# Patient Record
Sex: MALE | Race: WHITE | NOT HISPANIC OR LATINO | Employment: OTHER | URBAN - METROPOLITAN AREA
[De-identification: names, ages, dates, MRNs, and addresses within clinical notes are randomized per-mention and may not be internally consistent; named-entity substitution may affect disease eponyms.]

---

## 2017-04-03 ENCOUNTER — OFFICE VISIT (OUTPATIENT)
Dept: INTERNAL MEDICINE | Facility: CLINIC | Age: 52
End: 2017-04-03

## 2017-04-03 VITALS
SYSTOLIC BLOOD PRESSURE: 142 MMHG | WEIGHT: 240.4 LBS | TEMPERATURE: 97.7 F | BODY MASS INDEX: 34.41 KG/M2 | HEIGHT: 70 IN | DIASTOLIC BLOOD PRESSURE: 70 MMHG

## 2017-04-03 DIAGNOSIS — M25.572 ACUTE LEFT ANKLE PAIN: ICD-10-CM

## 2017-04-03 DIAGNOSIS — M25.50 CHRONIC JOINT PAIN: ICD-10-CM

## 2017-04-03 DIAGNOSIS — F41.9 ANXIETY AND DEPRESSION: ICD-10-CM

## 2017-04-03 DIAGNOSIS — A60.01 HERPES SIMPLEX INFECTION OF PENIS: ICD-10-CM

## 2017-04-03 DIAGNOSIS — I10 BENIGN ESSENTIAL HTN: Primary | ICD-10-CM

## 2017-04-03 DIAGNOSIS — G89.29 CHRONIC JOINT PAIN: ICD-10-CM

## 2017-04-03 DIAGNOSIS — Z12.11 SCREENING FOR COLON CANCER: ICD-10-CM

## 2017-04-03 DIAGNOSIS — F32.A ANXIETY AND DEPRESSION: ICD-10-CM

## 2017-04-03 DIAGNOSIS — K21.9 GASTROESOPHAGEAL REFLUX DISEASE WITHOUT ESOPHAGITIS: ICD-10-CM

## 2017-04-03 PROBLEM — F41.0 PANIC ATTACK: Status: ACTIVE | Noted: 2017-04-03

## 2017-04-03 PROCEDURE — 99204 OFFICE O/P NEW MOD 45 MIN: CPT | Performed by: INTERNAL MEDICINE

## 2017-04-03 RX ORDER — VALACYCLOVIR HYDROCHLORIDE 1 G/1
1000 TABLET, FILM COATED ORAL DAILY
Qty: 30 TABLET | Refills: 11 | Status: SHIPPED | OUTPATIENT
Start: 2017-04-03 | End: 2017-09-28 | Stop reason: SDUPTHER

## 2017-04-03 RX ORDER — METOPROLOL TARTRATE 50 MG/1
50 TABLET, FILM COATED ORAL 2 TIMES DAILY
COMMUNITY
End: 2017-04-03 | Stop reason: SDUPTHER

## 2017-04-03 RX ORDER — DIPHENHYDRAMINE HCL 25 MG
25 TABLET ORAL EVERY 8 HOURS PRN
Qty: 21 TABLET | Refills: 2 | Status: SHIPPED | OUTPATIENT
Start: 2017-04-03 | End: 2017-05-31 | Stop reason: SDUPTHER

## 2017-04-03 RX ORDER — AMITRIPTYLINE HYDROCHLORIDE 150 MG/1
150 TABLET, FILM COATED ORAL NIGHTLY
COMMUNITY
End: 2017-04-03 | Stop reason: SDUPTHER

## 2017-04-03 RX ORDER — GABAPENTIN 600 MG/1
1200 TABLET ORAL EVERY EVENING
Qty: 60 TABLET | Refills: 5 | Status: SHIPPED | OUTPATIENT
Start: 2017-04-03 | End: 2017-06-19 | Stop reason: SDUPTHER

## 2017-04-03 RX ORDER — HYDRALAZINE HYDROCHLORIDE 25 MG/1
1 TABLET, FILM COATED ORAL 2 TIMES DAILY
Refills: 0 | COMMUNITY
Start: 2017-03-09 | End: 2017-04-03 | Stop reason: SDUPTHER

## 2017-04-03 RX ORDER — METOPROLOL TARTRATE 50 MG/1
50 TABLET, FILM COATED ORAL 2 TIMES DAILY
Qty: 60 TABLET | Refills: 5 | Status: SHIPPED | OUTPATIENT
Start: 2017-04-03 | End: 2017-09-28 | Stop reason: SDUPTHER

## 2017-04-03 RX ORDER — GABAPENTIN 600 MG/1
2 TABLET ORAL NIGHTLY
Refills: 0 | COMMUNITY
Start: 2017-03-10 | End: 2017-04-03 | Stop reason: SDUPTHER

## 2017-04-03 RX ORDER — HYDRALAZINE HYDROCHLORIDE 25 MG/1
25 TABLET, FILM COATED ORAL 2 TIMES DAILY
Qty: 60 TABLET | Refills: 5 | Status: SHIPPED | OUTPATIENT
Start: 2017-04-03 | End: 2017-09-28 | Stop reason: SDUPTHER

## 2017-04-03 RX ORDER — AMITRIPTYLINE HYDROCHLORIDE 150 MG/1
150 TABLET, FILM COATED ORAL EVERY EVENING
Qty: 30 TABLET | Refills: 5 | Status: SHIPPED | OUTPATIENT
Start: 2017-04-03 | End: 2017-09-28 | Stop reason: SDUPTHER

## 2017-04-03 RX ORDER — LISINOPRIL 40 MG/1
40 TABLET ORAL DAILY
COMMUNITY
End: 2017-04-03 | Stop reason: SDUPTHER

## 2017-04-03 RX ORDER — LISINOPRIL 40 MG/1
40 TABLET ORAL DAILY
Qty: 30 TABLET | Refills: 5 | Status: SHIPPED | OUTPATIENT
Start: 2017-04-03 | End: 2017-09-28 | Stop reason: SDUPTHER

## 2017-04-03 RX ORDER — ACYCLOVIR 800 MG/1
1 TABLET ORAL 2 TIMES DAILY
Refills: 0 | COMMUNITY
Start: 2017-03-09 | End: 2017-04-03

## 2017-04-03 NOTE — PROGRESS NOTES
Subjective   Raj Kuhn is a 51 y.o. male here to establish care for HTN, DJD, chronic joint pain, GERD.  He needs refills on all his meds. Just recently got out of [place]way and getting acclimated back into society. He has had a multitude of orthopedic procedures and now has left ankle pain which is acute. He has never had surgery or a problem with this ankle, no injury, no swelling or deformity. Feels sharp and hurts every day with movement. Nothing makes worse or better other than rest. Is living in a detention house right now until July hopefully. He has hx HSV 2 (genital) and is on acyclovir but it is not keeping his outbreaks at bay. Valtrex works better. Also having GERD that was previously not bothering him but it has come back. Was on Nexium which helped him. Has recently been treated for scabies x2 while in [place] and it is much improved but still has some sores on his buttock and groin area that is itchy as well. Has a derm appt soon. Never had a cscope and interested in that. Brings his medical record today.     Review of Systems   Constitutional: Negative.    HENT: Negative.    Eyes: Negative.    Respiratory: Negative.    Cardiovascular: Negative.    Gastrointestinal:        Acid reflux   Endocrine: Negative.    Genitourinary: Negative.    Musculoskeletal: Positive for arthralgias and joint swelling.   Skin:        Itching, sores on groin and buttock   Allergic/Immunologic: Negative.    Neurological: Negative.    Hematological: Negative.    Psychiatric/Behavioral: Positive for dysphoric mood. The patient is nervous/anxious.        History reviewed. No pertinent past medical history.  Family History   Problem Relation Age of Onset   • Arthritis Mother    • Hypertension Mother    • Hypertension Father    • Thyroid disease Father    • Heart attack Brother      Past Surgical History:   Procedure Laterality Date   • HIP SURGERY     • KNEE SURGERY     • SHOULDER SURGERY     • TONSILLECTOMY     • VASECTOMY    "  • WRIST ARTHROPLASTY       Social History     Social History   • Marital status: Single     Spouse name: N/A   • Number of children: N/A   • Years of education: N/A     Occupational History   • Not on file.     Social History Main Topics   • Smoking status: Never Smoker   • Smokeless tobacco: Never Used   • Alcohol use No   • Drug use: No   • Sexual activity: Not on file     Other Topics Concern   • Not on file     Social History Narrative   • No narrative on file         Current Outpatient Prescriptions:   •  acyclovir (ZOVIRAX) 800 MG tablet, Take 1 tablet by mouth 2 (Two) Times a Day., Disp: , Rfl: 0  •  amitriptyline (ELAVIL) 150 MG tablet, Take 150 mg by mouth Every Night., Disp: , Rfl:   •  gabapentin (NEURONTIN) 600 MG tablet, Take 2 tablets by mouth Every Night., Disp: , Rfl: 0  •  hydrALAZINE (APRESOLINE) 25 MG tablet, Take 1 tablet by mouth 2 (Two) Times a Day., Disp: , Rfl: 0  •  lisinopril (PRINIVIL,ZESTRIL) 40 MG tablet, Take 40 mg by mouth Daily., Disp: , Rfl:   •  metoprolol tartrate (LOPRESSOR) 50 MG tablet, Take 50 mg by mouth 2 (Two) Times a Day., Disp: , Rfl:   •  sertraline (ZOLOFT) 50 MG tablet, Take 50 mg by mouth Daily., Disp: , Rfl:     Objective   /70 (BP Location: Right arm, Patient Position: Sitting, Cuff Size: Adult)  Temp 97.7 °F (36.5 °C) (Temporal Artery )   Ht 70\" (177.8 cm)  Wt 240 lb 6.4 oz (109 kg)  BMI 34.49 kg/m2  Physical Exam   Constitutional: He is oriented to person, place, and time. He appears well-developed and well-nourished.   HENT:   Head: Normocephalic and atraumatic.   Cardiovascular: Normal rate, regular rhythm and normal heart sounds.  Exam reveals no gallop and no friction rub.    No murmur heard.  Pulmonary/Chest: Effort normal and breath sounds normal. He has no wheezes.   Musculoskeletal:   Normal gait and station   Neurological: He is alert and oriented to person, place, and time.   Skin: Skin is warm and dry.   Erythematous round lesions on " forearms, no scabies tracks or signs of infection   Psychiatric: He has a normal mood and affect. His behavior is normal. Judgment and thought content normal.   Vitals reviewed.      Assessment/Plan   Raj was seen today for establish care. Reviewed extensive medical record and recent labs, labs normal.    Diagnoses and all orders for this visit:    Benign essential HTN  -     hydrALAZINE (APRESOLINE) 25 MG tablet; Take 1 tablet by mouth 2 (Two) Times a Day.  -     lisinopril (PRINIVIL,ZESTRIL) 40 MG tablet; Take 1 tablet by mouth Daily.  -     metoprolol tartrate (LOPRESSOR) 50 MG tablet; Take 1 tablet by mouth 2 (Two) Times a Day.    Gastroesophageal reflux disease without esophagitis  -     esomeprazole (NEXIUM) 20 MG capsule; Take 1 capsule by mouth Every Morning Before Breakfast.    Chronic joint pain  -     gabapentin (NEURONTIN) 600 MG tablet; Take 2 tablets by mouth Every Evening.  -     Ambulatory Referral to Pain Management    Anxiety and depression  -     amitriptyline (ELAVIL) 150 MG tablet; Take 1 tablet by mouth Every Evening.  -     sertraline (ZOLOFT) 50 MG tablet; Take 1 tablet by mouth Daily.    Acute left ankle pain  -     XR Ankle 3+ View Left    Herpes simplex infection of penis  -     valACYclovir (VALTREX) 1000 MG tablet; Take 1 tablet by mouth Daily.    Screening for colon cancer  -     Ambulatory Referral For Screening Colonoscopy    Other orders  -     diphenhydrAMINE (BENADRYL) 25 MG tablet; Take 1 tablet by mouth Every 8 (Eight) Hours As Needed for Itching.

## 2017-04-03 NOTE — PATIENT INSTRUCTIONS
Esomeprazole capsules  What is this medicine?  ESOMEPRAZOLE (es oh ME pray zol) prevents the production of acid in the stomach. It is used to treat gastroesophageal reflux disease (GERD), ulcers, certain bacteria in the stomach, and inflammation of the esophagus. It can also be used to prevent ulcers in patients taking medicines called NSAIDs.  You can also buy this medicine without a prescription to treat the symptoms of heartburn. The non-prescription product is not for long-term use, unless otherwise directed by your doctor or health care professional.  This medicine may be used for other purposes; ask your health care provider or pharmacist if you have questions.  COMMON BRAND NAME(S): Nexium, Nexium 24HR  What should I tell my health care provider before I take this medicine?  They need to know if you have any of these conditions:  -bloody or black, tarry stools  -chest pain  -have had heartburn for over 3 months  -have heartburn with dizziness, lightheadedness, or sweating  -liver disease  -low levels of magnesium in the blood  -lupus  -nausea, vomiting  -stomach pain  -trouble swallowing  -unexplained weight loss  -vomiting with blood  -wheezing  -an unusual or allergic reaction to esomeprazole, other medicines, foods, dyes, or preservatives  -pregnant or trying to get pregnant  -breast-feeding  How should I use this medicine?  Take this medicine by mouth. Swallow the capsules whole with a drink of water. Follow the directions on the prescription or product label. Do not crush, break or chew. The capsules can be opened and the contents sprinkled on applesauce. Do not crush the contents into the food. This medicine works best if taken on an empty stomach at least one hour before a meal. Take your medicine at regular intervals. Do not take your medicine more often than directed.  Talk to your pediatrician regarding the use of this medicine in children. Special care may be needed.  Overdosage: If you think you  have taken too much of this medicine contact a poison control center or emergency room at once.  NOTE: This medicine is only for you. Do not share this medicine with others.  What if I miss a dose?  If you miss a dose, take it as soon as you can. If it is almost time for your next dose, take only that dose. Do not take double or extra doses.  What may interact with this medicine?  Do not take this medicine with any of the following medications:  -atazanavir  This medicine may also interact with the following medications:  -ampicillin  -antiviral medicines for HIV or AIDS  -certain medicines for fungal infections like ketoconazole and itraconazole  -cilostazol  -clopidogrel  -diazepam  -digoxin  -erlotinib  -diuretics  -iron salts  -methotrexate  -Nathaniel's Wort  -tacrolimus  -rifampin  -warfarin  This list may not describe all possible interactions. Give your health care provider a list of all the medicines, herbs, non-prescription drugs, or dietary supplements you use. Also tell them if you smoke, drink alcohol, or use illegal drugs. Some items may interact with your medicine.  What should I watch for while using this medicine?  If you are taking this medicine without a prescription, it may take 1 to 4 days for it to fully relieve your heartburn.  If you are using this medicine with a prescription from your healthcare professional for a more serious condition, it can take several days before your condition gets better. Check with your doctor or health care professional if your condition does not start to get better, or if it gets worse.  If you take this medicine for long periods of time, you may need blood work done.  What side effects may I notice from receiving this medicine?  Side effects that you should report to your doctor or health care professional as soon as possible:  -allergic reactions like skin rash, itching or hives, swelling of the face, lips, or tongue  -bone, muscle or joint pain  -breathing  problems  -chest pain or chest tightness  -dark yellow or brown urine  -fast, irregular heartbeat  -feeling faint or lightheaded  -fever or sore throat  -muscle spasms  -rash on cheeks or arms that gets worse in the sun  -tremors  -unusual bleeding or bruising  -unusually weak or tired  -upset stomach  -yellowing of the eyes or skin  Side effects that usually do not require medical attention (report to your doctor or health care professional if they continue or are bothersome):  -constipation  -diarrhea  -dry mouth  -headache  -nausea  -stomach pain or gas  -vomiting  This list may not describe all possible side effects. Call your doctor for medical advice about side effects. You may report side effects to FDA at 6-347-FDA-5207.  Where should I keep my medicine?  Keep out of the reach of children.  Store at room temperature between 15 and 30 degrees C (59 and 86 degrees F). Protect from light and moisture. Throw away any unused medicine after the expiration date.  NOTE: This sheet is a summary. It may not cover all possible information. If you have questions about this medicine, talk to your doctor, pharmacist, or health care provider.     © 2017, [employer]. (2017-01-19 12:52:41)

## 2017-04-07 ENCOUNTER — HOSPITAL ENCOUNTER (OUTPATIENT)
Dept: GENERAL RADIOLOGY | Facility: HOSPITAL | Age: 52
Discharge: HOME | End: 2017-04-07
Admitting: INTERNAL MEDICINE

## 2017-04-07 PROCEDURE — 73610 X-RAY EXAM OF ANKLE: CPT

## 2017-04-26 ENCOUNTER — OFFICE VISIT (OUTPATIENT)
Dept: INTERNAL MEDICINE | Facility: CLINIC | Age: 52
End: 2017-04-26

## 2017-04-26 VITALS
WEIGHT: 243 LBS | DIASTOLIC BLOOD PRESSURE: 78 MMHG | TEMPERATURE: 97.9 F | HEIGHT: 70 IN | BODY MASS INDEX: 34.79 KG/M2 | SYSTOLIC BLOOD PRESSURE: 134 MMHG

## 2017-04-26 DIAGNOSIS — M54.50 ACUTE BILATERAL LOW BACK PAIN WITHOUT SCIATICA: Primary | ICD-10-CM

## 2017-04-26 PROCEDURE — 99213 OFFICE O/P EST LOW 20 MIN: CPT | Performed by: INTERNAL MEDICINE

## 2017-04-26 PROCEDURE — 96372 THER/PROPH/DIAG INJ SC/IM: CPT | Performed by: INTERNAL MEDICINE

## 2017-04-26 RX ORDER — KETOROLAC TROMETHAMINE 30 MG/ML
30 INJECTION, SOLUTION INTRAMUSCULAR; INTRAVENOUS ONCE
Status: DISCONTINUED | OUTPATIENT
Start: 2017-04-26 | End: 2017-04-26

## 2017-04-26 RX ORDER — CYCLOBENZAPRINE HCL 5 MG
5-10 TABLET ORAL 3 TIMES DAILY PRN
Qty: 42 TABLET | Refills: 2 | Status: SHIPPED | OUTPATIENT
Start: 2017-04-26 | End: 2017-05-31 | Stop reason: SDUPTHER

## 2017-04-26 RX ORDER — HYDROCODONE BITARTRATE AND ACETAMINOPHEN 5; 325 MG/1; MG/1
1 TABLET ORAL EVERY 6 HOURS PRN
Qty: 12 TABLET | Refills: 0 | Status: SHIPPED | OUTPATIENT
Start: 2017-04-26 | End: 2017-09-01 | Stop reason: SDUPTHER

## 2017-04-26 RX ORDER — KETOROLAC TROMETHAMINE 30 MG/ML
60 INJECTION, SOLUTION INTRAMUSCULAR; INTRAVENOUS ONCE
Status: COMPLETED | OUTPATIENT
Start: 2017-04-26 | End: 2017-04-26

## 2017-04-26 RX ADMIN — KETOROLAC TROMETHAMINE 60 MG: 30 INJECTION, SOLUTION INTRAMUSCULAR; INTRAVENOUS at 09:31

## 2017-04-26 NOTE — PATIENT INSTRUCTIONS
Back Pain, Adult  Back pain is very common in adults. The cause of back pain is rarely dangerous and the pain often gets better over time. The cause of your back pain may not be known. Some common causes of back pain include:  · Strain of the muscles or ligaments supporting the spine.  · Wear and tear (degeneration) of the spinal disks.  · Arthritis.  · Direct injury to the back.  For many people, back pain may return. Since back pain is rarely dangerous, most people can learn to manage this condition on their own.  HOME CARE INSTRUCTIONS  Watch your back pain for any changes. The following actions may help to lessen any discomfort you are feeling:  · Remain active. It is stressful on your back to sit or  one place for long periods of time. Do not sit, drive, or  one place for more than 30 minutes at a time. Take short walks on even surfaces as soon as you are able. Try to increase the length of time you walk each day.  · Exercise regularly as directed by your health care provider. Exercise helps your back heal faster. It also helps avoid future injury by keeping your muscles strong and flexible.  · Do not stay in bed. Resting more than 1-2 days can delay your recovery.  · Pay attention to your body when you bend and lift. The most comfortable positions are those that put less stress on your recovering back. Always use proper lifting techniques, including:    Bending your knees.    Keeping the load close to your body.    Avoiding twisting.  · Find a comfortable position to sleep. Use a firm mattress and lie on your side with your knees slightly bent. If you lie on your back, put a pillow under your knees.  · Avoid feeling anxious or stressed. Stress increases muscle tension and can worsen back pain. It is important to recognize when you are anxious or stressed and learn ways to manage it, such as with exercise.  · Take medicines only as directed by your health care provider. Over-the-counter  medicines to reduce pain and inflammation are often the most helpful. Your health care provider may prescribe muscle relaxant drugs. These medicines help dull your pain so you can more quickly return to your normal activities and healthy exercise.  · Apply ice to the injured area:    Put ice in a plastic bag.    Place a towel between your skin and the bag.    Leave the ice on for 20 minutes, 2-3 times a day for the first 2-3 days. After that, ice and heat may be alternated to reduce pain and spasms.  · Maintain a healthy weight. Excess weight puts extra stress on your back and makes it difficult to maintain good posture.  SEEK MEDICAL CARE IF:  · You have pain that is not relieved with rest or medicine.  · You have increasing pain going down into the legs or buttocks.  · You have pain that does not improve in one week.  · You have night pain.  · You lose weight.  · You have a fever or chills.  SEEK IMMEDIATE MEDICAL CARE IF:   · You develop new bowel or bladder control problems.  · You have unusual weakness or numbness in your arms or legs.  · You develop nausea or vomiting.  · You develop abdominal pain.  · You feel faint.     This information is not intended to replace advice given to you by your health care provider. Make sure you discuss any questions you have with your health care provider.     Document Released: 12/18/2006 Document Revised: 01/08/2016 Document Reviewed: 04/21/2015  [employer] Interactive Patient Education ©2016 [employer]

## 2017-04-26 NOTE — PROGRESS NOTES
"Subjective   Raj Kuhn is a 51 y.o. male here for acute back pain. He has chronic back pain but it has flared after volunteering recently and lifting several heavy boxes of food/cans. It is the second day since that activity. It feels like a spasm, achy, sharp at times, low back radiating to either side. No sciatica but does also have chronic b/l knee pain and left shoulder pain. No numbness or tingling in legs or feet. Has taken an NSAID with no relief. Toradol inj used to work. Tramadol has been prescribed in the past but made him feel very anxious and shaky so prefers to not have that. Has appt with his ortho next week.       The following portions of the patient's history were reviewed and updated as appropriate: allergies, current medications, past medical history, past social history, past surgical history and problem list.    Review of Systems:  General: negative  MSK: multiple joints pain  Skin: negative  Neuro: negative    Objective   /78 (BP Location: Right arm, Patient Position: Sitting, Cuff Size: Large Adult)  Temp 97.9 °F (36.6 °C) (Temporal Artery )   Ht 70\" (177.8 cm)  Wt 243 lb (110 kg)  BMI 34.87 kg/m2    Physical Exam   Constitutional: He is oriented to person, place, and time. He appears well-developed and well-nourished.   Pulmonary/Chest: Effort normal.   Musculoskeletal:   Normal gait and station though appears to be in pain   Neurological: He is alert and oriented to person, place, and time.   Skin: Skin is warm and dry.   Psychiatric: He has a normal mood and affect. His behavior is normal. Judgment and thought content normal.   Vitals reviewed.      Assessment/Plan   Raj was seen today for back pain.    Diagnoses and all orders for this visit:    Acute bilateral low back pain without sciatica  -     cyclobenzaprine (FLEXERIL) 5 MG tablet; Take 1-2 tablets by mouth 3 (Three) Times a Day As Needed for Muscle Spasms.  -     HYDROcodone-acetaminophen (NORCO) 5-325 MG per " tablet; Take 1 tablet by mouth Every 6 (Six) Hours As Needed for Moderate Pain (4-6) or Severe Pain (7-10).  -     ketorolac (TORADOL) injection 60 mg; Inject 60 mg into the shoulder, thigh, or buttocks 1 (One) Time.

## 2017-05-31 DIAGNOSIS — M54.50 ACUTE BILATERAL LOW BACK PAIN WITHOUT SCIATICA: ICD-10-CM

## 2017-05-31 RX ORDER — CYCLOBENZAPRINE HCL 5 MG
5-10 TABLET ORAL 3 TIMES DAILY PRN
Qty: 42 TABLET | Refills: 2 | Status: SHIPPED | OUTPATIENT
Start: 2017-05-31 | End: 2017-09-01 | Stop reason: SDUPTHER

## 2017-05-31 RX ORDER — DIPHENHYDRAMINE HCL 25 MG
25 TABLET ORAL EVERY 8 HOURS PRN
Qty: 21 TABLET | Refills: 2 | Status: SHIPPED | OUTPATIENT
Start: 2017-05-31 | End: 2017-09-28

## 2017-06-19 ENCOUNTER — TELEPHONE (OUTPATIENT)
Dept: INTERNAL MEDICINE | Facility: CLINIC | Age: 52
End: 2017-06-19

## 2017-06-19 DIAGNOSIS — M25.50 CHRONIC JOINT PAIN: ICD-10-CM

## 2017-06-19 DIAGNOSIS — G89.29 CHRONIC JOINT PAIN: ICD-10-CM

## 2017-06-19 RX ORDER — GABAPENTIN 600 MG/1
1200 TABLET ORAL EVERY EVENING
Qty: 60 TABLET | Refills: 1
Start: 2017-06-19 | End: 2017-07-03 | Stop reason: SDUPTHER

## 2017-06-19 NOTE — TELEPHONE ENCOUNTER
Patient is requesting a hand written script for Neurontin. Patient states that pharmacy will no longer honor his refills since the medication will soon be a controlled.

## 2017-07-03 DIAGNOSIS — M25.50 CHRONIC JOINT PAIN: ICD-10-CM

## 2017-07-03 DIAGNOSIS — G89.29 CHRONIC JOINT PAIN: ICD-10-CM

## 2017-07-03 RX ORDER — GABAPENTIN 600 MG/1
1200 TABLET ORAL EVERY EVENING
Qty: 60 TABLET | Refills: 1 | OUTPATIENT
Start: 2017-07-03 | End: 2017-09-28 | Stop reason: SDUPTHER

## 2017-07-18 ENCOUNTER — TELEPHONE (OUTPATIENT)
Dept: GASTROENTEROLOGY | Facility: CLINIC | Age: 52
End: 2017-07-18

## 2017-07-18 DIAGNOSIS — R11.2 NAUSEA AND VOMITING, INTRACTABILITY OF VOMITING NOT SPECIFIED, UNSPECIFIED VOMITING TYPE: Primary | ICD-10-CM

## 2017-07-18 RX ORDER — ONDANSETRON 4 MG/1
4 TABLET, FILM COATED ORAL EVERY 6 HOURS PRN
Qty: 6 TABLET | Refills: 0 | Status: SHIPPED | OUTPATIENT
Start: 2017-07-18 | End: 2017-09-28

## 2017-07-20 ENCOUNTER — OUTSIDE FACILITY SERVICE (OUTPATIENT)
Dept: GASTROENTEROLOGY | Facility: CLINIC | Age: 52
End: 2017-07-20

## 2017-07-20 ENCOUNTER — LAB REQUISITION (OUTPATIENT)
Dept: LAB | Facility: HOSPITAL | Age: 52
End: 2017-07-20

## 2017-07-20 DIAGNOSIS — D12.0 BENIGN NEOPLASM OF CECUM: ICD-10-CM

## 2017-07-20 DIAGNOSIS — D12.5 BENIGN NEOPLASM OF SIGMOID COLON: ICD-10-CM

## 2017-07-20 PROCEDURE — 45385 COLONOSCOPY W/LESION REMOVAL: CPT | Performed by: INTERNAL MEDICINE

## 2017-07-20 PROCEDURE — 88305 TISSUE EXAM BY PATHOLOGIST: CPT | Performed by: INTERNAL MEDICINE

## 2017-07-20 PROCEDURE — G0500 MOD SEDAT ENDO SERVICE >5YRS: HCPCS | Performed by: INTERNAL MEDICINE

## 2017-07-21 LAB
CYTO UR: NORMAL
LAB AP CASE REPORT: NORMAL
LAB AP CLINICAL INFORMATION: NORMAL
Lab: NORMAL
PATH REPORT.FINAL DX SPEC: NORMAL
PATH REPORT.GROSS SPEC: NORMAL

## 2017-08-31 ENCOUNTER — TELEPHONE (OUTPATIENT)
Dept: INTERNAL MEDICINE | Facility: CLINIC | Age: 52
End: 2017-08-31

## 2017-09-01 ENCOUNTER — OFFICE VISIT (OUTPATIENT)
Dept: INTERNAL MEDICINE | Facility: CLINIC | Age: 52
End: 2017-09-01

## 2017-09-01 VITALS
SYSTOLIC BLOOD PRESSURE: 120 MMHG | DIASTOLIC BLOOD PRESSURE: 68 MMHG | WEIGHT: 232 LBS | TEMPERATURE: 97.4 F | BODY MASS INDEX: 33.21 KG/M2 | HEIGHT: 70 IN

## 2017-09-01 DIAGNOSIS — Z23 NEED FOR INFLUENZA VACCINATION: ICD-10-CM

## 2017-09-01 DIAGNOSIS — M25.562 ACUTE PAIN OF LEFT KNEE: Primary | ICD-10-CM

## 2017-09-01 PROCEDURE — 99213 OFFICE O/P EST LOW 20 MIN: CPT | Performed by: INTERNAL MEDICINE

## 2017-09-01 PROCEDURE — 90656 IIV3 VACC NO PRSV 0.5 ML IM: CPT | Performed by: INTERNAL MEDICINE

## 2017-09-01 PROCEDURE — 90471 IMMUNIZATION ADMIN: CPT | Performed by: INTERNAL MEDICINE

## 2017-09-01 RX ORDER — CYCLOBENZAPRINE HCL 5 MG
5-10 TABLET ORAL 3 TIMES DAILY PRN
Qty: 42 TABLET | Refills: 2 | Status: SHIPPED | OUTPATIENT
Start: 2017-09-01 | End: 2017-09-28

## 2017-09-01 RX ORDER — HYDROCODONE BITARTRATE AND ACETAMINOPHEN 5; 325 MG/1; MG/1
1-2 TABLET ORAL EVERY 8 HOURS PRN
Qty: 18 TABLET | Refills: 0 | Status: SHIPPED | OUTPATIENT
Start: 2017-09-01 | End: 2017-09-05 | Stop reason: SDUPTHER

## 2017-09-01 NOTE — PATIENT INSTRUCTIONS
Acetaminophen; Hydrocodone tablets or capsules  What is this medicine?  ACETAMINOPHEN; HYDROCODONE (a set a KELSI eric fen; ed droe KOE done) is a pain reliever. It is used to treat moderate to severe pain.  This medicine may be used for other purposes; ask your health care provider or pharmacist if you have questions.  COMMON BRAND NAME(S): Anexsia, Bancap HC, Ceta-Plus, Co-Gesic, Comfortpak, Dolagesic, Dolorex Forte, DuoCet, Hydrocet, Hydrogesic, Lorcet, Lorcet HD, Lorcet Plus, Lortab, Margesic H, Maxidone, Norco, Polygesic, Stagesic, Vanacet, Verdrocet, Vicodin, Vicodin ES, Vicodin HP, Xodol, Zydone  What should I tell my health care provider before I take this medicine?  They need to know if you have any of these conditions:  -brain tumor  -Crohn's disease, inflammatory bowel disease, or ulcerative colitis  -drug abuse or addiction  -head injury  -heart or circulation problems  -if you often drink alcohol  -kidney disease or problems going to the bathroom  -liver disease  -lung disease, asthma, or breathing problems  -an unusual or allergic reaction to acetaminophen, hydrocodone, other opioid analgesics, other medicines, foods, dyes, or preservatives  -pregnant or trying to get pregnant  -breast-feeding  How should I use this medicine?  Take this medicine by mouth with a glass of water. Follow the directions on the prescription label. You can take it with or without food. If it upsets your stomach, take it with food. Do not take your medicine more often than directed.  A special MedGuide will be given to you by the pharmacist with each prescription and refill. Be sure to read this information carefully each time.  Talk to your pediatrician regarding the use of this medicine in children. Special care may be needed.  Overdosage: If you think you have taken too much of this medicine contact a poison control center or emergency room at once.  NOTE: This medicine is only for you. Do not share this medicine with  others.  What if I miss a dose?  If you miss a dose, take it as soon as you can. If it is almost time for your next dose, take only that dose. Do not take double or extra doses.  What may interact with this medicine?  This medicine may interact with the following medications:  -alcohol  -antiviral medicines for HIV or AIDS  -atropine  -antihistamines for allergy, cough and cold  -certain antibiotics like erythromycin, clarithromycin  -certain medicines for anxiety or sleep  -certain medicines for bladder problems like oxybutynin, tolterodine  -certain medicines for depression like amitriptyline, fluoxetine, sertraline  -certain medicines for fungal infections like ketoconazole and itraconazole  -certain medicines for Parkinson's disease like benztropine, trihexyphenidyl  -certain medicines for seizures like carbamazepine, phenobarbital, phenytoin, primidone  -certain medicines for stomach problems like dicyclomine, hyoscyamine  -certain medicines for travel sickness like scopolamine  -general anesthetics like halothane, isoflurane, methoxyflurane, propofol  -ipratropium  -local anesthetics like lidocaine, pramoxine, tetracaine  -MAOIs like Carbex, Eldepryl, Marplan, Nardil, and Parnate  -medicines that relax muscles for surgery  -other medicines with acetaminophen  -other narcotic medicines for pain or cough  -phenothiazines like chlorpromazine, mesoridazine, prochlorperazine, thioridazine  -rifampin  This list may not describe all possible interactions. Give your health care provider a list of all the medicines, herbs, non-prescription drugs, or dietary supplements you use. Also tell them if you smoke, drink alcohol, or use illegal drugs. Some items may interact with your medicine.  What should I watch for while using this medicine?  Tell your doctor or health care professional if your pain does not go away, if it gets worse, or if you have new or a different type of pain. You may develop tolerance to the medicine.  Tolerance means that you will need a higher dose of the medicine for pain relief. Tolerance is normal and is expected if you take the medicine for a long time.  Do not suddenly stop taking your medicine because you may develop a severe reaction. Your body becomes used to the medicine. This does NOT mean you are addicted. Addiction is a behavior related to getting and using a drug for a non-medical reason. If you have pain, you have a medical reason to take pain medicine. Your doctor will tell you how much medicine to take. If your doctor wants you to stop the medicine, the dose will be slowly lowered over time to avoid any side effects.  There are different types of narcotic medicines (opiates). If you take more than one type at the same time or if you are taking another medicine that also causes drowsiness, you may have more side effects. Give your health care provider a list of all medicines you use. Your doctor will tell you how much medicine to take. Do not take more medicine than directed. Call emergency for help if you have problems breathing or unusual sleepiness.  Do not take other medicines that contain acetaminophen with this medicine. Always read labels carefully. If you have questions, ask your doctor or pharmacist.  If you take too much acetaminophen get medical help right away. Too much acetaminophen can be very dangerous and cause liver damage. Even if you do not have symptoms, it is important to get help right away.  You may get drowsy or dizzy. Do not drive, use machinery, or do anything that needs mental alertness until you know how this medicine affects you. Do not stand or sit up quickly, especially if you are an older patient. This reduces the risk of dizzy or fainting spells. Alcohol may interfere with the effect of this medicine. Avoid alcoholic drinks.  The medicine will cause constipation. Try to have a bowel movement at least every 2 to 3 days. If you do not have a bowel movement for 3  days, call your doctor or health care professional.  Your mouth may get dry. Chewing sugarless gum or sucking hard candy, and drinking plenty of water may help. Contact your doctor if the problem does not go away or is severe.  What side effects may I notice from receiving this medicine?  Side effects that you should report to your doctor or health care professional as soon as possible:  -allergic reactions like skin rash, itching or hives, swelling of the face, lips, or tongue  -breathing problems  -confusion  -redness, blistering, peeling or loosening of the skin, including inside the mouth  -signs and symptoms of low blood pressure like dizziness; feeling faint or lightheaded, falls; unusually weak or tired  -trouble passing urine or change in the amount of urine  -yellowing of the eyes or skin  Side effects that usually do not require medical attention (report to your doctor or health care professional if they continue or are bothersome):  -constipation  -dry mouth  -nausea, vomiting  -tiredness  This list may not describe all possible side effects. Call your doctor for medical advice about side effects. You may report side effects to FDA at 6-999-FDA-2009.  Where should I keep my medicine?  Keep out of the reach of children. This medicine can be abused. Keep your medicine in a safe place to protect it from theft. Do not share this medicine with anyone. Selling or giving away this medicine is dangerous and against the law.  This medicine may cause accidental overdose and death if it taken by other adults, children, or pets. Mix any unused medicine with a substance like cat litter or coffee grounds. Then throw the medicine away in a sealed container like a sealed bag or a coffee can with a lid. Do not use the medicine after the expiration date.  Store at room temperature between 15 and 30 degrees C (59 and 86 degrees F).  NOTE: This sheet is a summary. It may not cover all possible information. If you have  questions about this medicine, talk to your doctor, pharmacist, or health care provider.     © 2017, [employer]. (2016-09-09 10:02:16)

## 2017-09-01 NOTE — PROGRESS NOTES
"Subjective   Raj Kuhn is a 52 y.o. male here for acute left knee pain since last week. He started walking around his neighborhood for exercise (3-4 times) and since then he has had left knee pain.  He is most concerned about the swelling that has gone along with it. It is mostly above his knee joint. He is also having shooting pain into the upper calf and lower thigh that he describes as muscle spasm and tightness in the muscle. He has hx of quadriceps and cruciate ligament tears in that knee. He has an ortho in [place] that he sees frequently but with it about to be the weekend he was concerned and didn't want to end up in the ED.        The following portions of the patient's history were reviewed and updated as appropriate: allergies, current medications, past medical history, past social history and problem list.    Review of Systems:  General: negative  Neuro: no numbness  MSK: see hpi    Objective   /68 (BP Location: Right arm, Patient Position: Sitting, Cuff Size: Adult)  Temp 97.4 °F (36.3 °C) (Temporal Artery )   Ht 70\" (177.8 cm)  Wt 232 lb (105 kg)  BMI 33.29 kg/m2    Physical Exam   Constitutional: He appears well-developed and well-nourished.   Musculoskeletal:   Left knee with mild effusion under patella and some non-edematous swelling above knee joint. Normal ROM. No erythema, warmth, and no ttp.  No calf or thigh pain though there is active muscle spasm and tightness of left IT band   Skin: No erythema.   No erythema of knee   Vitals reviewed.      Assessment/Plan   Raj was seen today for knee pain.    Diagnoses and all orders for this visit:    Acute pain of left knee  -     HYDROcodone-acetaminophen (NORCO) 5-325 MG per tablet; Take 1-2 tablets by mouth Every 8 (Eight) Hours As Needed for Moderate Pain  or Severe Pain .  -     cyclobenzaprine (FLEXERIL) 5 MG tablet; Take 1-2 tablets by mouth 3 (Three) Times a Day As Needed for Muscle Spasms.     Need for influenza " vaccination  -     Flu Vaccine Quad PF >18YR (AFLURIA 4885-8111)

## 2017-09-05 DIAGNOSIS — M25.562 ACUTE PAIN OF LEFT KNEE: ICD-10-CM

## 2017-09-06 RX ORDER — HYDROCODONE BITARTRATE AND ACETAMINOPHEN 5; 325 MG/1; MG/1
1-2 TABLET ORAL EVERY 8 HOURS PRN
Qty: 18 TABLET | Refills: 0 | Status: SHIPPED | OUTPATIENT
Start: 2017-09-06 | End: 2017-09-28

## 2017-09-28 ENCOUNTER — OFFICE VISIT (OUTPATIENT)
Dept: INTERNAL MEDICINE | Facility: CLINIC | Age: 52
End: 2017-09-28

## 2017-09-28 VITALS
SYSTOLIC BLOOD PRESSURE: 124 MMHG | DIASTOLIC BLOOD PRESSURE: 72 MMHG | TEMPERATURE: 97.7 F | BODY MASS INDEX: 34.59 KG/M2 | WEIGHT: 241.6 LBS | HEIGHT: 70 IN

## 2017-09-28 DIAGNOSIS — F41.0 PANIC ATTACK: ICD-10-CM

## 2017-09-28 DIAGNOSIS — A60.01 HERPES SIMPLEX INFECTION OF PENIS: ICD-10-CM

## 2017-09-28 DIAGNOSIS — F41.9 ANXIETY AND DEPRESSION: Primary | ICD-10-CM

## 2017-09-28 DIAGNOSIS — G89.29 CHRONIC JOINT PAIN: ICD-10-CM

## 2017-09-28 DIAGNOSIS — I10 BENIGN ESSENTIAL HTN: ICD-10-CM

## 2017-09-28 DIAGNOSIS — M25.50 CHRONIC JOINT PAIN: ICD-10-CM

## 2017-09-28 DIAGNOSIS — F32.A ANXIETY AND DEPRESSION: Primary | ICD-10-CM

## 2017-09-28 DIAGNOSIS — K21.9 GASTROESOPHAGEAL REFLUX DISEASE, ESOPHAGITIS PRESENCE NOT SPECIFIED: ICD-10-CM

## 2017-09-28 PROCEDURE — 99214 OFFICE O/P EST MOD 30 MIN: CPT | Performed by: INTERNAL MEDICINE

## 2017-09-28 RX ORDER — METOPROLOL TARTRATE 50 MG/1
50 TABLET, FILM COATED ORAL 2 TIMES DAILY
Qty: 60 TABLET | Refills: 5 | Status: SHIPPED | OUTPATIENT
Start: 2017-09-28 | End: 2018-03-23 | Stop reason: SDUPTHER

## 2017-09-28 RX ORDER — CYCLOBENZAPRINE HCL 10 MG
10 TABLET ORAL 3 TIMES DAILY PRN
Qty: 90 TABLET | Refills: 5 | Status: SHIPPED | OUTPATIENT
Start: 2017-09-28 | End: 2018-03-26 | Stop reason: SDUPTHER

## 2017-09-28 RX ORDER — OMEPRAZOLE 20 MG/1
20 CAPSULE, DELAYED RELEASE ORAL DAILY
Qty: 90 CAPSULE | Refills: 2 | Status: SHIPPED | OUTPATIENT
Start: 2017-09-28 | End: 2018-03-23 | Stop reason: SDUPTHER

## 2017-09-28 RX ORDER — VALACYCLOVIR HYDROCHLORIDE 1 G/1
1000 TABLET, FILM COATED ORAL DAILY
Qty: 30 TABLET | Refills: 11 | Status: SHIPPED | OUTPATIENT
Start: 2017-09-28 | End: 2018-03-26 | Stop reason: SDUPTHER

## 2017-09-28 RX ORDER — HYDRALAZINE HYDROCHLORIDE 25 MG/1
25 TABLET, FILM COATED ORAL 2 TIMES DAILY
Qty: 60 TABLET | Refills: 5 | Status: SHIPPED | OUTPATIENT
Start: 2017-09-28 | End: 2018-03-26 | Stop reason: SDUPTHER

## 2017-09-28 RX ORDER — AMITRIPTYLINE HYDROCHLORIDE 150 MG/1
150 TABLET, FILM COATED ORAL EVERY EVENING
Qty: 30 TABLET | Refills: 5 | Status: SHIPPED | OUTPATIENT
Start: 2017-09-28 | End: 2018-03-23 | Stop reason: SDUPTHER

## 2017-09-28 RX ORDER — LISINOPRIL 40 MG/1
40 TABLET ORAL DAILY
Qty: 30 TABLET | Refills: 5 | Status: SHIPPED | OUTPATIENT
Start: 2017-09-28 | End: 2018-03-23 | Stop reason: SDUPTHER

## 2017-09-28 RX ORDER — ESCITALOPRAM OXALATE 10 MG/1
10 TABLET ORAL DAILY
Qty: 30 TABLET | Refills: 5 | Status: SHIPPED | OUTPATIENT
Start: 2017-09-28 | End: 2018-03-23 | Stop reason: SDUPTHER

## 2017-09-28 RX ORDER — GABAPENTIN 600 MG/1
1200 TABLET ORAL EVERY EVENING
Qty: 60 TABLET | Refills: 5 | Status: SHIPPED | OUTPATIENT
Start: 2017-09-28 | End: 2018-03-23 | Stop reason: SDUPTHER

## 2017-09-28 NOTE — PROGRESS NOTES
"Subjective   Raj Kuhn is a 52 y.o. male here for follow-up neuropathy, chronic joint pain, HTN, A&D. Gabapentin has worked well for his neuropathy thus far, needs a refill. Chronic joint pain continues, f/u with ortho tomorrow. Pain mgmt appt pending. Knee pain from last visit is much improved though his knees bother him chronically. No issues with BP, very pleased with his BP today. Compliant with meds. A&d is bothering him more however. Been on zoloft for years and doesn't think it's working. Anxiety is much higher recently and he has a bit of depression. Just feels overall anxiety. He also has low libido and no erections which he is concerned about. Wants to try something different.       The following portions of the patient's history were reviewed and updated as appropriate: allergies, current medications, past medical history, past social history and problem list.    Review of Systems:  General: negative  CV: negative  Respiratory: negative  : low libido, no erections  Neuro: neuropathy  Psych: A&D  MSK: chronic joint pain    Objective   /72 (BP Location: Right arm, Patient Position: Sitting, Cuff Size: Adult)  Temp 97.7 °F (36.5 °C) (Temporal Artery )   Ht 70\" (177.8 cm)  Wt 241 lb 9.6 oz (110 kg)  BMI 34.67 kg/m2    Physical Exam   Constitutional: He is oriented to person, place, and time. He appears well-developed and well-nourished.   Cardiovascular: Normal rate, regular rhythm and normal heart sounds.    Pulmonary/Chest: Effort normal and breath sounds normal. He has no wheezes. He has no rales.   Neurological: He is alert and oriented to person, place, and time.   Skin: Skin is warm and dry.   Psychiatric: He has a normal mood and affect. His behavior is normal. Thought content normal.   Vitals reviewed.      Assessment/Plan   Raj was seen today for hypertension.    Diagnoses and all orders for this visit:    Anxiety and depression  -     escitalopram (LEXAPRO) 10 MG tablet; Take 1 " tablet by mouth Daily. New med, discussed taper off zoloft and gave pt written instructions for that and to start lexapro after he stops zoloft. Discussed common SE  -     amitriptyline (ELAVIL) 150 MG tablet; Take 1 tablet by mouth Every Evening.    Chronic joint pain  -     gabapentin (NEURONTIN) 600 MG tablet; Take 2 tablets by mouth Every Evening.  -     cyclobenzaprine (FLEXERIL) 10 MG tablet; Take 1 tablet by mouth 3 (Three) Times a Day As Needed for Muscle Spasms.  -     Drugs of abuse swab done, narc contract signed, ferdinand not immediately available due to forces beyond my control but will evaluate as soon as it is made available to me.      Discussed that gabapentin is a controlled medication and should only be taken as prescribed, discussed abuse     Panic attack  -     escitalopram (LEXAPRO) 10 MG tablet; Take 1 tablet by mouth Daily.    Gastroesophageal reflux disease, esophagitis presence not specified  -     omeprazole (PRILOSEC) 20 MG capsule; Take 1 capsule by mouth Daily.    Herpes simplex infection of penis  -     valACYclovir (VALTREX) 1000 MG tablet; Take 1 tablet by mouth Daily.    Benign essential HTN  -     hydrALAZINE (APRESOLINE) 25 MG tablet; Take 1 tablet by mouth 2 (Two) Times a Day.  -     lisinopril (PRINIVIL,ZESTRIL) 40 MG tablet; Take 1 tablet by mouth Daily.  -     metoprolol tartrate (LOPRESSOR) 50 MG tablet; Take 1 tablet by mouth 2 (Two) Times a Day.

## 2017-09-28 NOTE — PATIENT INSTRUCTIONS
Gabapentin capsules or tablets  What is this medicine?  GABAPENTIN (GA ba pen tin) is used to control partial seizures in adults with epilepsy. It is also used to treat certain types of nerve pain.  This medicine may be used for other purposes; ask your health care provider or pharmacist if you have questions.  COMMON BRAND NAME(S): Active-PAC with Gabapentin, Gabarone, Neurontin  What should I tell my health care provider before I take this medicine?  They need to know if you have any of these conditions:  -kidney disease  -suicidal thoughts, plans, or attempt; a previous suicide attempt by you or a family member  -an unusual or allergic reaction to gabapentin, other medicines, foods, dyes, or preservatives  -pregnant or trying to get pregnant  -breast-feeding  How should I use this medicine?  Take this medicine by mouth with a glass of water. Follow the directions on the prescription label. You can take it with or without food. If it upsets your stomach, take it with food.Take your medicine at regular intervals. Do not take it more often than directed. Do not stop taking except on your doctor's advice.  If you are directed to break the 600 or 800 mg tablets in half as part of your dose, the extra half tablet should be used for the next dose. If you have not used the extra half tablet within 28 days, it should be thrown away.  A special MedGuide will be given to you by the pharmacist with each prescription and refill. Be sure to read this information carefully each time.  Talk to your pediatrician regarding the use of this medicine in children. Special care may be needed.  Overdosage: If you think you have taken too much of this medicine contact a poison control center or emergency room at once.  NOTE: This medicine is only for you. Do not share this medicine with others.  What if I miss a dose?  If you miss a dose, take it as soon as you can. If it is almost time for your next dose, take only that dose. Do not  take double or extra doses.  What may interact with this medicine?  Do not take this medicine with any of the following medications:  -other gabapentin products  This medicine may also interact with the following medications:  -alcohol  -antacids  -antihistamines for allergy, cough and cold  -certain medicines for anxiety or sleep  -certain medicines for depression or psychotic disturbances  -homatropine; hydrocodone  -naproxen  -narcotic medicines (opiates) for pain  -phenothiazines like chlorpromazine, mesoridazine, prochlorperazine, thioridazine  This list may not describe all possible interactions. Give your health care provider a list of all the medicines, herbs, non-prescription drugs, or dietary supplements you use. Also tell them if you smoke, drink alcohol, or use illegal drugs. Some items may interact with your medicine.  What should I watch for while using this medicine?  Visit your doctor or health care professional for regular checks on your progress. You may want to keep a record at home of how you feel your condition is responding to treatment. You may want to share this information with your doctor or health care professional at each visit. You should contact your doctor or health care professional if your seizures get worse or if you have any new types of seizures. Do not stop taking this medicine or any of your seizure medicines unless instructed by your doctor or health care professional. Stopping your medicine suddenly can increase your seizures or their severity.  Wear a medical identification bracelet or chain if you are taking this medicine for seizures, and carry a card that lists all your medications.  You may get drowsy, dizzy, or have blurred vision. Do not drive, use machinery, or do anything that needs mental alertness until you know how this medicine affects you. To reduce dizzy or fainting spells, do not sit or stand up quickly, especially if you are an older patient. Alcohol can  increase drowsiness and dizziness. Avoid alcoholic drinks.  Your mouth may get dry. Chewing sugarless gum or sucking hard candy, and drinking plenty of water will help.  The use of this medicine may increase the chance of suicidal thoughts or actions. Pay special attention to how you are responding while on this medicine. Any worsening of mood, or thoughts of suicide or dying should be reported to your health care professional right away.  Women who become pregnant while using this medicine may enroll in the [place] Antiepileptic Drug Pregnancy Registry by calling 1-306.273.1127. This registry collects information about the safety of antiepileptic drug use during pregnancy.  What side effects may I notice from receiving this medicine?  Side effects that you should report to your doctor or health care professional as soon as possible:  -allergic reactions like skin rash, itching or hives, swelling of the face, lips, or tongue  -worsening of mood, thoughts or actions of suicide or dying  Side effects that usually do not require medical attention (report to your doctor or health care professional if they continue or are bothersome):  -constipation  -difficulty walking or controlling muscle movements  -dizziness  -nausea  -slurred speech  -tiredness  -tremors  -weight gain  This list may not describe all possible side effects. Call your doctor for medical advice about side effects. You may report side effects to FDA at 7-507-FDA-9755.  Where should I keep my medicine?  Keep out of reach of children.  This medicine may cause accidental overdose and death if it taken by other adults, children, or pets. Mix any unused medicine with a substance like cat litter or coffee grounds. Then throw the medicine away in a sealed container like a sealed bag or a coffee can with a lid. Do not use the medicine after the expiration date.  Store at room temperature between 15 and 30 degrees C (59 and 86 degrees F).  NOTE: This  sheet is a summary. It may not cover all possible information. If you have questions about this medicine, talk to your doctor, pharmacist, or health care provider.     © 2017, [employer]. (2015-02-13 15:26:50)

## 2017-11-27 ENCOUNTER — OFFICE VISIT (OUTPATIENT)
Dept: INTERNAL MEDICINE | Facility: CLINIC | Age: 52
End: 2017-11-27

## 2017-11-27 VITALS — HEIGHT: 70 IN | WEIGHT: 251.6 LBS | BODY MASS INDEX: 36.02 KG/M2 | TEMPERATURE: 99.3 F

## 2017-11-27 DIAGNOSIS — J02.9 ACUTE PHARYNGITIS, UNSPECIFIED ETIOLOGY: ICD-10-CM

## 2017-11-27 DIAGNOSIS — J01.10 ACUTE NON-RECURRENT FRONTAL SINUSITIS: Primary | ICD-10-CM

## 2017-11-27 LAB
EXPIRATION DATE: NORMAL
INTERNAL CONTROL: NORMAL
Lab: NORMAL
S PYO AG THROAT QL: NEGATIVE

## 2017-11-27 PROCEDURE — 99213 OFFICE O/P EST LOW 20 MIN: CPT | Performed by: INTERNAL MEDICINE

## 2017-11-27 PROCEDURE — 87880 STREP A ASSAY W/OPTIC: CPT | Performed by: INTERNAL MEDICINE

## 2017-11-27 RX ORDER — SULFAMETHOXAZOLE AND TRIMETHOPRIM 800; 160 MG/1; MG/1
1 TABLET ORAL 2 TIMES DAILY
Qty: 14 TABLET | Refills: 0 | Status: SHIPPED | OUTPATIENT
Start: 2017-11-27 | End: 2018-01-12

## 2017-11-27 RX ORDER — ACETAMINOPHEN AND CODEINE PHOSPHATE 300; 30 MG/1; MG/1
1 TABLET ORAL EVERY 6 HOURS PRN
Qty: 12 TABLET | Refills: 0 | Status: SHIPPED | OUTPATIENT
Start: 2017-11-27 | End: 2018-01-12

## 2017-11-27 NOTE — PROGRESS NOTES
"Subjective   Raj Kuhn is a 52 y.o. male here for sinus pressure, congestion for 2 weeks but worsening last night acutely. He has felt hot though no documented fever. Has sinus pain, head pressure and headache, facial tenderness, feels like face is swollen. Overall feels fatigued and ill. No rash, slightly sore throat, no cough, some ear pressure as well.     PMH: reviewed  Meds: reviewed  Allergies: reviewed    Review of Systems   Constitutional: Positive for activity change, fatigue and fever. Negative for chills.   HENT: Positive for congestion, postnasal drip, rhinorrhea, sinus pressure, sneezing and sore throat. Negative for ear discharge and ear pain.    Respiratory: Negative for cough, shortness of breath and wheezing.    Gastrointestinal: Negative.    Musculoskeletal: Negative.    Skin: Negative.          Objective   Temp 99.3 °F (37.4 °C) (Temporal Artery )   Ht 70\" (177.8 cm)  Wt 251 lb 9.6 oz (114 kg)  BMI 36.1 kg/m2    Physical Exam   Constitutional: He appears well-developed and well-nourished.   HENT:   Right Ear: Tympanic membrane, external ear and ear canal normal.   Left Ear: Tympanic membrane, external ear and ear canal normal.   Nose: Rhinorrhea present. No mucosal edema. Right sinus exhibits maxillary sinus tenderness and frontal sinus tenderness. Left sinus exhibits maxillary sinus tenderness and frontal sinus tenderness.   Mouth/Throat: Mucous membranes are normal. Posterior oropharyngeal erythema present. No oropharyngeal exudate or posterior oropharyngeal edema.   Neck: Neck supple.   Pulmonary/Chest: Effort normal and breath sounds normal. No respiratory distress. He has no wheezes.   Lymphadenopathy:     He has no cervical adenopathy.   Skin: Skin is warm and dry.   Vitals reviewed.      Assessment/Plan   Raj was seen today for sinusitis.    Diagnoses and all orders for this visit:    Acute non-recurrent frontal sinusitis  -     acetaminophen-codeine (TYLENOL #3) 300-30 MG per " tablet; Take 1 tablet by mouth Every 6 (Six) Hours As Needed for Moderate Pain  (headache/facial pain).  -     sulfamethoxazole-trimethoprim (BACTRIM DS,SEPTRA DS) 800-160 MG per tablet; Take 1 tablet by mouth 2 (Two) Times a Day.    Acute pharyngitis, unspecified etiology  -     POC Rapid Strep A  -     sulfamethoxazole-trimethoprim (BACTRIM DS,SEPTRA DS) 800-160 MG per tablet; Take 1 tablet by mouth 2 (Two) Times a Day.

## 2017-12-11 ENCOUNTER — TELEPHONE (OUTPATIENT)
Dept: INTERNAL MEDICINE | Facility: CLINIC | Age: 52
End: 2017-12-11

## 2017-12-28 ENCOUNTER — TELEPHONE (OUTPATIENT)
Dept: INTERNAL MEDICINE | Facility: CLINIC | Age: 52
End: 2017-12-28

## 2017-12-28 DIAGNOSIS — G89.29 CHRONIC JOINT PAIN: Primary | ICD-10-CM

## 2017-12-28 DIAGNOSIS — M25.50 CHRONIC JOINT PAIN: Primary | ICD-10-CM

## 2017-12-28 NOTE — TELEPHONE ENCOUNTER
----- Message from Shabnam Zimmer MD sent at 12/28/2017 12:17 PM EST -----  Regarding: FW: Referral Request  Contact: [phone]   Please call and explain to him that I reviewed the paper work but it's the disability paper work that asks about bending, sitting, lifting, etc that I don't routinely fill out. I can't take what his ortho said and put it on paper as my own evaluation as that is fraud. Generally people have to see a doctor who specializes in disability exams.    ----- Message -----     From: Yenny Gabriel MA     Sent: 12/28/2017  12:06 PM       To: Shabnam Zimmer MD  Subject: FW: Referral Request                                 ----- Message -----     From: Raj Kuhn     Sent: 12/28/2017  11:29 AM       To: [facility]  Subject: Referral Request                                 Hi Dr. Zimmer,     I just made an appointment to see Dr. Joseph and  Ortho/Sports Medicine on January 4th.  [facility] said they need you to fax a referral to them at [phone] addressed to [facility] so he could process it before my appointment.      One more thing, were you able to complete the form for my disability.  I think there was a little confusion when I called to check if it was completed.  I brought two forms to you as well as the one Dr. Sandoval filled out for your records.  One form was a mental health form that you said you couldn't fill out because it asked for specific mental health values like AXIS, etc, the other was a blank form like Dr. Sandoval filled out.  You said you would be glad to fill that out because we discussed my need to have as much documentation to give to the disability people.  Thanks in advance for your assistance.      Thanks,   Raj Kuhn

## 2018-01-12 ENCOUNTER — OFFICE VISIT (OUTPATIENT)
Dept: INTERNAL MEDICINE | Facility: CLINIC | Age: 53
End: 2018-01-12

## 2018-01-12 VITALS
OXYGEN SATURATION: 96 % | SYSTOLIC BLOOD PRESSURE: 132 MMHG | HEIGHT: 70 IN | WEIGHT: 253.4 LBS | RESPIRATION RATE: 16 BRPM | TEMPERATURE: 98.6 F | DIASTOLIC BLOOD PRESSURE: 78 MMHG | HEART RATE: 94 BPM | BODY MASS INDEX: 36.28 KG/M2

## 2018-01-12 DIAGNOSIS — M54.42 ACUTE BILATERAL LOW BACK PAIN WITH LEFT-SIDED SCIATICA: Primary | ICD-10-CM

## 2018-01-12 PROCEDURE — 99213 OFFICE O/P EST LOW 20 MIN: CPT | Performed by: INTERNAL MEDICINE

## 2018-01-12 PROCEDURE — 96372 THER/PROPH/DIAG INJ SC/IM: CPT | Performed by: INTERNAL MEDICINE

## 2018-01-12 RX ORDER — CELECOXIB 200 MG/1
CAPSULE ORAL
COMMUNITY
Start: 2017-12-13 | End: 2019-05-13

## 2018-01-12 RX ORDER — HYDROCODONE BITARTRATE AND ACETAMINOPHEN 5; 325 MG/1; MG/1
1-2 TABLET ORAL EVERY 12 HOURS PRN
Qty: 12 TABLET | Refills: 0 | Status: SHIPPED | OUTPATIENT
Start: 2018-01-12 | End: 2018-06-18 | Stop reason: SDUPTHER

## 2018-01-12 RX ORDER — KETOROLAC TROMETHAMINE 30 MG/ML
30 INJECTION, SOLUTION INTRAMUSCULAR; INTRAVENOUS ONCE
Status: COMPLETED | OUTPATIENT
Start: 2018-01-12 | End: 2018-01-12

## 2018-01-12 RX ADMIN — KETOROLAC TROMETHAMINE 30 MG: 30 INJECTION, SOLUTION INTRAMUSCULAR; INTRAVENOUS at 14:30

## 2018-01-12 NOTE — PROGRESS NOTES
"Subjective   Raj Kuhn is a 52 y.o. male here for acute back pain. He was doing some heavy lifting recently, cleaning out a storage unit) and since then he has had back pain for 2 days. It is lumbar, L>R, associated with left-sided sciatica. The pain is dull and achy but can be very sharp and stabbing at times with certain movements. He isn't able to move very much due to pain. He also continues to have his chronic right shoulder and right knee pain as well that is unchanged.    PMH: reviewed  Meds: reviewed    Review of Systems   Constitutional: Negative.    Musculoskeletal: Positive for back pain and gait problem.   Skin: Negative.    Neurological: Negative for weakness.        Sciatica         Objective   /78  Pulse 94  Temp 98.6 °F (37 °C) (Temporal Artery )   Resp 16  Ht 177.8 cm (70\")  Wt 115 kg (253 lb 6.4 oz)  SpO2 96%  BMI 36.36 kg/m2    Physical Exam   Constitutional: He is oriented to person, place, and time. He appears well-developed and well-nourished.   Pulmonary/Chest: Effort normal.   Neurological: He is alert and oriented to person, place, and time.   Skin: Skin is warm and dry.   Psychiatric: He has a normal mood and affect. His behavior is normal. Judgment and thought content normal.   Vitals reviewed.      Assessment/Plan   Raj was seen today for back pain and shoulder pain.    Diagnoses and all orders for this visit:    Acute bilateral low back pain with left-sided sciatica  -     ketorolac (TORADOL) injection 30 mg; Inject 30 mg into the shoulder, thigh, or buttocks 1 (One) Time.  -     HYDROcodone-acetaminophen (NORCO) 5-325 MG per tablet; Take 1-2 tablets by mouth Every 12 (Twelve) Hours As Needed for Moderate Pain  or Severe Pain .           "

## 2018-03-23 DIAGNOSIS — I10 BENIGN ESSENTIAL HTN: ICD-10-CM

## 2018-03-23 DIAGNOSIS — F41.9 ANXIETY AND DEPRESSION: ICD-10-CM

## 2018-03-23 DIAGNOSIS — G89.29 CHRONIC JOINT PAIN: ICD-10-CM

## 2018-03-23 DIAGNOSIS — F32.A ANXIETY AND DEPRESSION: ICD-10-CM

## 2018-03-23 DIAGNOSIS — F41.0 PANIC ATTACK: ICD-10-CM

## 2018-03-23 DIAGNOSIS — K21.9 GASTROESOPHAGEAL REFLUX DISEASE, ESOPHAGITIS PRESENCE NOT SPECIFIED: ICD-10-CM

## 2018-03-23 DIAGNOSIS — M25.50 CHRONIC JOINT PAIN: ICD-10-CM

## 2018-03-23 RX ORDER — AMITRIPTYLINE HYDROCHLORIDE 150 MG/1
150 TABLET, FILM COATED ORAL EVERY EVENING
Qty: 30 TABLET | Refills: 5 | Status: SHIPPED | OUTPATIENT
Start: 2018-03-23 | End: 2018-04-11 | Stop reason: SDUPTHER

## 2018-03-23 RX ORDER — LISINOPRIL 40 MG/1
40 TABLET ORAL DAILY
Qty: 30 TABLET | Refills: 5 | Status: SHIPPED | OUTPATIENT
Start: 2018-03-23 | End: 2018-04-11 | Stop reason: SDUPTHER

## 2018-03-23 RX ORDER — ESCITALOPRAM OXALATE 10 MG/1
10 TABLET ORAL DAILY
Qty: 30 TABLET | Refills: 5 | Status: SHIPPED | OUTPATIENT
Start: 2018-03-23 | End: 2018-04-11 | Stop reason: SDUPTHER

## 2018-03-23 RX ORDER — OMEPRAZOLE 20 MG/1
20 CAPSULE, DELAYED RELEASE ORAL DAILY
Qty: 90 CAPSULE | Refills: 5 | Status: SHIPPED | OUTPATIENT
Start: 2018-03-23 | End: 2018-04-11 | Stop reason: SDUPTHER

## 2018-03-23 RX ORDER — METOPROLOL TARTRATE 50 MG/1
50 TABLET, FILM COATED ORAL 2 TIMES DAILY
Qty: 60 TABLET | Refills: 5 | Status: SHIPPED | OUTPATIENT
Start: 2018-03-23 | End: 2018-04-11 | Stop reason: SDUPTHER

## 2018-03-23 RX ORDER — GABAPENTIN 600 MG/1
1200 TABLET ORAL EVERY EVENING
Qty: 60 TABLET | Refills: 2
Start: 2018-03-23 | End: 2018-04-11 | Stop reason: SDUPTHER

## 2018-03-26 DIAGNOSIS — I10 BENIGN ESSENTIAL HTN: ICD-10-CM

## 2018-03-26 DIAGNOSIS — M25.50 CHRONIC JOINT PAIN: ICD-10-CM

## 2018-03-26 DIAGNOSIS — A60.01 HERPES SIMPLEX INFECTION OF PENIS: ICD-10-CM

## 2018-03-26 DIAGNOSIS — G89.29 CHRONIC JOINT PAIN: ICD-10-CM

## 2018-03-26 RX ORDER — HYDRALAZINE HYDROCHLORIDE 25 MG/1
25 TABLET, FILM COATED ORAL 2 TIMES DAILY
Qty: 60 TABLET | Refills: 5 | Status: SHIPPED | OUTPATIENT
Start: 2018-03-26 | End: 2018-04-11 | Stop reason: SDUPTHER

## 2018-03-26 RX ORDER — VALACYCLOVIR HYDROCHLORIDE 1 G/1
1000 TABLET, FILM COATED ORAL DAILY
Qty: 30 TABLET | Refills: 5 | Status: SHIPPED | OUTPATIENT
Start: 2018-03-26 | End: 2018-04-11 | Stop reason: SDUPTHER

## 2018-03-26 RX ORDER — CYCLOBENZAPRINE HCL 10 MG
10 TABLET ORAL 3 TIMES DAILY PRN
Qty: 90 TABLET | Refills: 5 | Status: SHIPPED | OUTPATIENT
Start: 2018-03-26 | End: 2018-04-11 | Stop reason: SDUPTHER

## 2018-04-11 ENCOUNTER — OFFICE VISIT (OUTPATIENT)
Dept: INTERNAL MEDICINE | Facility: CLINIC | Age: 53
End: 2018-04-11

## 2018-04-11 VITALS — HEIGHT: 70 IN | SYSTOLIC BLOOD PRESSURE: 124 MMHG | DIASTOLIC BLOOD PRESSURE: 70 MMHG | HEART RATE: 68 BPM

## 2018-04-11 DIAGNOSIS — F41.9 ANXIETY AND DEPRESSION: ICD-10-CM

## 2018-04-11 DIAGNOSIS — I10 BENIGN ESSENTIAL HTN: Primary | ICD-10-CM

## 2018-04-11 DIAGNOSIS — F41.0 PANIC ATTACK: ICD-10-CM

## 2018-04-11 DIAGNOSIS — F32.A ANXIETY AND DEPRESSION: ICD-10-CM

## 2018-04-11 DIAGNOSIS — G89.29 CHRONIC JOINT PAIN: ICD-10-CM

## 2018-04-11 DIAGNOSIS — M25.50 CHRONIC JOINT PAIN: ICD-10-CM

## 2018-04-11 DIAGNOSIS — A60.01 HERPES SIMPLEX INFECTION OF PENIS: ICD-10-CM

## 2018-04-11 DIAGNOSIS — K21.9 GASTROESOPHAGEAL REFLUX DISEASE, ESOPHAGITIS PRESENCE NOT SPECIFIED: ICD-10-CM

## 2018-04-11 PROCEDURE — 99214 OFFICE O/P EST MOD 30 MIN: CPT | Performed by: INTERNAL MEDICINE

## 2018-04-11 RX ORDER — GABAPENTIN 600 MG/1
1200 TABLET ORAL EVERY EVENING
Qty: 60 TABLET | Refills: 2
Start: 2018-04-11 | End: 2018-04-11 | Stop reason: SDUPTHER

## 2018-04-11 RX ORDER — PROMETHAZINE HYDROCHLORIDE 25 MG/1
25 TABLET ORAL EVERY 8 HOURS PRN
Qty: 42 TABLET | Refills: 2 | Status: SHIPPED | OUTPATIENT
Start: 2018-04-11 | End: 2018-06-14 | Stop reason: SDUPTHER

## 2018-04-11 RX ORDER — CYCLOBENZAPRINE HCL 10 MG
10 TABLET ORAL 3 TIMES DAILY PRN
Qty: 90 TABLET | Refills: 5 | Status: SHIPPED | OUTPATIENT
Start: 2018-04-11 | End: 2019-01-11 | Stop reason: SDUPTHER

## 2018-04-11 RX ORDER — LISINOPRIL 40 MG/1
40 TABLET ORAL DAILY
Qty: 30 TABLET | Refills: 5 | Status: SHIPPED | OUTPATIENT
Start: 2018-04-11 | End: 2018-10-13 | Stop reason: SDUPTHER

## 2018-04-11 RX ORDER — HYDRALAZINE HYDROCHLORIDE 25 MG/1
25 TABLET, FILM COATED ORAL 2 TIMES DAILY
Qty: 60 TABLET | Refills: 5 | Status: SHIPPED | OUTPATIENT
Start: 2018-04-11 | End: 2018-10-13 | Stop reason: SDUPTHER

## 2018-04-11 RX ORDER — OMEPRAZOLE 20 MG/1
20 CAPSULE, DELAYED RELEASE ORAL DAILY
Qty: 90 CAPSULE | Refills: 5 | Status: SHIPPED | OUTPATIENT
Start: 2018-04-11 | End: 2019-04-11 | Stop reason: SDUPTHER

## 2018-04-11 RX ORDER — METOPROLOL TARTRATE 50 MG/1
50 TABLET, FILM COATED ORAL 2 TIMES DAILY
Qty: 60 TABLET | Refills: 5 | Status: SHIPPED | OUTPATIENT
Start: 2018-04-11 | End: 2018-10-05

## 2018-04-11 RX ORDER — VALACYCLOVIR HYDROCHLORIDE 1 G/1
1000 TABLET, FILM COATED ORAL DAILY
Qty: 30 TABLET | Refills: 5 | Status: SHIPPED | OUTPATIENT
Start: 2018-04-11 | End: 2020-03-09 | Stop reason: SDUPTHER

## 2018-04-11 RX ORDER — ESCITALOPRAM OXALATE 10 MG/1
10 TABLET ORAL DAILY
Qty: 30 TABLET | Refills: 5 | Status: SHIPPED | OUTPATIENT
Start: 2018-04-11 | End: 2018-08-17

## 2018-04-11 RX ORDER — GABAPENTIN 600 MG/1
1200 TABLET ORAL EVERY EVENING
Qty: 60 TABLET | Refills: 2 | Status: SHIPPED | OUTPATIENT
Start: 2018-04-11 | End: 2021-07-21

## 2018-04-11 RX ORDER — AMITRIPTYLINE HYDROCHLORIDE 150 MG/1
150 TABLET, FILM COATED ORAL EVERY EVENING
Qty: 30 TABLET | Refills: 5 | Status: SHIPPED | OUTPATIENT
Start: 2018-04-11 | End: 2018-11-09 | Stop reason: SDUPTHER

## 2018-04-11 NOTE — PROGRESS NOTES
"Subjective   Raj Kuhn is a 52 y.o. male here for follow-up HTN, chronic joint pain, A&D, GERD. HTN: no issues, compliant with meds. BP has been controlled for a long time. No symptoms. Chronic joint pain: takes gabapentin which helps. Just had left shoulder replacement a few weeks ago at . Doing well with that. Pain controlled. Has some redness and irritation at the scar site at the end; squeezed it but nothing came out. A&D: mood controlled. Had some increased depression before the surgery but now in PT and mood is getting much better. GERD: on PPI, controlled sx.      The following portions of the patient's history were reviewed and updated as appropriate: allergies, current medications, past medical history, past social history, past surgical history and problem list.     Surgery: left shoulder replacement    Review of Systems:  General: negative  CV: negative  Respiratory: negative  GI: nausea  Neuro: negative  Psych: A&D  MSK: chronic joint pain    Objective   /70 (BP Location: Right arm, Patient Position: Sitting)   Pulse 68   Ht 177.8 cm (70\")     Physical Exam   Constitutional: He is oriented to person, place, and time. He appears well-developed and well-nourished.   Cardiovascular: Normal rate, regular rhythm and normal heart sounds.    Pulmonary/Chest: Effort normal and breath sounds normal. He has no wheezes. He has no rales.   Musculoskeletal:   Left shoulder in sling   Neurological: He is alert and oriented to person, place, and time.   Skin: Skin is warm and dry.   Left lateral scar, healing well. Posterior margin with some erythema but no signs of infection   Psychiatric: He has a normal mood and affect. His behavior is normal. Thought content normal.   Vitals reviewed.      Assessment/Plan   Raj was seen today for hypertension.    Diagnoses and all orders for this visit:    Benign essential HTN  -     metoprolol tartrate (LOPRESSOR) 50 MG tablet; Take 1 tablet by mouth 2 (Two) " Times a Day.  -     hydrALAZINE (APRESOLINE) 25 MG tablet; Take 1 tablet by mouth 2 (Two) Times a Day.  -     lisinopril (PRINIVIL,ZESTRIL) 40 MG tablet; Take 1 tablet by mouth Daily.    Chronic joint pain  -     Discontinue: gabapentin (NEURONTIN) 600 MG tablet; Take 2 tablets by mouth Every Evening.  -     cyclobenzaprine (FLEXERIL) 10 MG tablet; Take 1 tablet by mouth 3 (Three) Times a Day As Needed for Muscle Spasms.  -     gabapentin (NEURONTIN) 600 MG tablet; Take 2 tablets by mouth Every Evening.    Anxiety and depression  -     amitriptyline (ELAVIL) 150 MG tablet; Take 1 tablet by mouth Every Evening.  -     escitalopram (LEXAPRO) 10 MG tablet; Take 1 tablet by mouth Daily.    Panic attack  -     escitalopram (LEXAPRO) 10 MG tablet; Take 1 tablet by mouth Daily.    Gastroesophageal reflux disease, esophagitis presence not specified  -     omeprazole (PRILOSEC) 20 MG capsule; Take 1 capsule by mouth Daily.    Herpes simplex infection of penis  -     valACYclovir (VALTREX) 1000 MG tablet; Take 1 tablet by mouth Daily.    Other orders  -     promethazine (PHENERGAN) 25 MG tablet; Take 1 tablet by mouth Every 8 (Eight) Hours As Needed for Nausea or Vomiting.

## 2018-06-14 RX ORDER — PROMETHAZINE HYDROCHLORIDE 25 MG/1
TABLET ORAL
Qty: 42 TABLET | Refills: 1 | Status: SHIPPED | OUTPATIENT
Start: 2018-06-14 | End: 2018-08-17

## 2018-06-18 ENCOUNTER — OFFICE VISIT (OUTPATIENT)
Dept: INTERNAL MEDICINE | Facility: CLINIC | Age: 53
End: 2018-06-18

## 2018-06-18 VITALS — TEMPERATURE: 97.8 F | HEIGHT: 70 IN | BODY MASS INDEX: 36.08 KG/M2 | WEIGHT: 252 LBS

## 2018-06-18 DIAGNOSIS — M25.562 ACUTE PAIN OF LEFT KNEE: Primary | ICD-10-CM

## 2018-06-18 DIAGNOSIS — M79.605 PAIN OF LEFT LOWER EXTREMITY: ICD-10-CM

## 2018-06-18 DIAGNOSIS — M62.838 MUSCLE SPASM: ICD-10-CM

## 2018-06-18 DIAGNOSIS — G89.29 CHRONIC JOINT PAIN: ICD-10-CM

## 2018-06-18 DIAGNOSIS — M54.42 ACUTE BILATERAL LOW BACK PAIN WITH LEFT-SIDED SCIATICA: ICD-10-CM

## 2018-06-18 DIAGNOSIS — M25.50 CHRONIC JOINT PAIN: ICD-10-CM

## 2018-06-18 PROCEDURE — 99213 OFFICE O/P EST LOW 20 MIN: CPT | Performed by: INTERNAL MEDICINE

## 2018-06-18 RX ORDER — DIAZEPAM 5 MG/1
5 TABLET ORAL 2 TIMES DAILY PRN
Qty: 6 TABLET | Refills: 0 | Status: SHIPPED | OUTPATIENT
Start: 2018-06-18 | End: 2018-08-17

## 2018-06-18 RX ORDER — HYDROCODONE BITARTRATE AND ACETAMINOPHEN 5; 325 MG/1; MG/1
1-2 TABLET ORAL EVERY 8 HOURS PRN
Qty: 18 TABLET | Refills: 0 | Status: SHIPPED | OUTPATIENT
Start: 2018-06-18 | End: 2018-06-21

## 2018-06-18 NOTE — PROGRESS NOTES
"Subjective   Raj Kuhn is a 52 y.o. male here for new left knee and left leg pain. He has a very complicated ortho past. He sees an ortho in [place] and has appt with him next Tuesday, but having these new sx. He was stretching out in bed and lifted leg and felt a pop in the back of his thigh. Then had pain in the area radiating down the leg and even into the foot. Nothing is helping. Takes NSAIDs and muscle relaxants which have not helped. He is interested in pain mgmt for the future as he has frequent joint pain due to multiple ortho procedures. He is seeking disability. He is having difficulty walking. Has no pain meds.  Denies any history of drug abuse.    The following portions of the patient's history were reviewed and updated as appropriate: allergies, current medications, past medical history, past social history, past surgical history and problem list.    Review of Systems:  General: negative  Neuro: negative  MSK: see hpi    Objective   Temp 97.8 °F (36.6 °C) (Temporal Artery )   Ht 177.8 cm (70\")   Wt 114 kg (252 lb)   BMI 36.16 kg/m²     Physical Exam   Constitutional: He is oriented to person, place, and time. He appears well-developed and well-nourished.   Pulmonary/Chest: Effort normal.   Neurological: He is alert and oriented to person, place, and time.   Skin: Skin is warm and dry.   Psychiatric: He has a normal mood and affect. His behavior is normal. Judgment and thought content normal.   Vitals reviewed.      Assessment/Plan   Raj was seen today for leg pain.    Diagnoses and all orders for this visit:    Acute pain of left knee  -     Ambulatory Referral to Orthopedic Surgery    Pain of left lower extremity  -     Ambulatory Referral to Orthopedic Surgery    Acute bilateral low back pain with left-sided sciatica  -     HYDROcodone-acetaminophen (NORCO) 5-325 MG per tablet; Take 1-2 tablets by mouth Every 8 (Eight) Hours As Needed for Moderate Pain  or Severe Pain  for up to 3 " days.    Chronic joint pain  -     Ambulatory Referral to Pain Management    Muscle spasm  -     diazePAM (VALIUM) 5 MG tablet; Take 1 tablet by mouth 2 (Two) Times a Day As Needed for Anxiety.           Answers for HPI/ROS submitted by the patient on 6/16/2018   Lower extremity pain  Incident occurred: 6 to 12 hours ago  Incident location: at home  Injury mechanism: other  Pain location: left hip, left thigh, left leg, left knee, left ankle, left foot  Pain quality: aching, burning, cramping, shooting, stabbing  Pain - numeric: 6/10  Pain course: worsening  tingling: No  inability to bear weight: Yes  loss of motion: Yes  loss of sensation: No  muscle weakness: Yes  Aggravated by: movement, palpation, weight bearing

## 2018-07-19 ENCOUNTER — TRANSCRIBE ORDERS (OUTPATIENT)
Dept: ADMINISTRATIVE | Facility: HOSPITAL | Age: 53
End: 2018-07-19

## 2018-07-19 ENCOUNTER — HOSPITAL ENCOUNTER (OUTPATIENT)
Dept: GENERAL RADIOLOGY | Facility: HOSPITAL | Age: 53
Discharge: HOME | End: 2018-07-19
Admitting: ANESTHESIOLOGY

## 2018-07-19 DIAGNOSIS — M47.816 SPONDYLOSIS OF LUMBAR REGION WITHOUT MYELOPATHY OR RADICULOPATHY: ICD-10-CM

## 2018-07-19 DIAGNOSIS — M47.816 SPONDYLOSIS OF LUMBAR REGION WITHOUT MYELOPATHY OR RADICULOPATHY: Primary | ICD-10-CM

## 2018-07-19 PROCEDURE — 72110 X-RAY EXAM L-2 SPINE 4/>VWS: CPT

## 2018-08-15 RX ORDER — PROMETHAZINE HYDROCHLORIDE 25 MG/1
TABLET ORAL
Qty: 42 TABLET | Refills: 1 | Status: SHIPPED | OUTPATIENT
Start: 2018-08-15 | End: 2019-05-13

## 2018-08-17 ENCOUNTER — OFFICE VISIT (OUTPATIENT)
Dept: INTERNAL MEDICINE | Facility: CLINIC | Age: 53
End: 2018-08-17

## 2018-08-17 VITALS — WEIGHT: 249 LBS | BODY MASS INDEX: 35.65 KG/M2 | HEIGHT: 70 IN | TEMPERATURE: 97.2 F

## 2018-08-17 DIAGNOSIS — F41.9 ANXIETY AND DEPRESSION: ICD-10-CM

## 2018-08-17 DIAGNOSIS — F41.0 PANIC ATTACK: ICD-10-CM

## 2018-08-17 DIAGNOSIS — G89.29 CHRONIC BILATERAL LOW BACK PAIN WITHOUT SCIATICA: Primary | ICD-10-CM

## 2018-08-17 DIAGNOSIS — M54.50 CHRONIC BILATERAL LOW BACK PAIN WITHOUT SCIATICA: Primary | ICD-10-CM

## 2018-08-17 DIAGNOSIS — F32.A ANXIETY AND DEPRESSION: ICD-10-CM

## 2018-08-17 DIAGNOSIS — M62.838 MUSCLE SPASM: ICD-10-CM

## 2018-08-17 PROCEDURE — 99214 OFFICE O/P EST MOD 30 MIN: CPT | Performed by: INTERNAL MEDICINE

## 2018-08-17 PROCEDURE — 96372 THER/PROPH/DIAG INJ SC/IM: CPT | Performed by: INTERNAL MEDICINE

## 2018-08-17 RX ORDER — DIAZEPAM 10 MG/1
10 TABLET ORAL EVERY 8 HOURS PRN
Qty: 9 TABLET | Refills: 0 | Status: SHIPPED | OUTPATIENT
Start: 2018-08-17 | End: 2018-08-17 | Stop reason: SDUPTHER

## 2018-08-17 RX ORDER — DIAZEPAM 10 MG/1
10 TABLET ORAL EVERY 8 HOURS PRN
Qty: 9 TABLET | Refills: 0 | Status: SHIPPED | OUTPATIENT
Start: 2018-08-17 | End: 2018-09-06

## 2018-08-17 RX ORDER — KETOROLAC TROMETHAMINE 30 MG/ML
30 INJECTION, SOLUTION INTRAMUSCULAR; INTRAVENOUS EVERY 6 HOURS PRN
Status: SHIPPED | OUTPATIENT
Start: 2018-08-17 | End: 2018-08-22

## 2018-08-17 RX ORDER — ESCITALOPRAM OXALATE 20 MG/1
20 TABLET ORAL DAILY
Qty: 30 TABLET | Refills: 2 | Status: SHIPPED | OUTPATIENT
Start: 2018-08-17 | End: 2018-12-23 | Stop reason: SDUPTHER

## 2018-08-17 RX ADMIN — KETOROLAC TROMETHAMINE 30 MG: 30 INJECTION, SOLUTION INTRAMUSCULAR; INTRAVENOUS at 15:56

## 2018-08-17 NOTE — PROGRESS NOTES
"Subjective   Raj Kuhn is a 52 y.o. male here for follow-up chronic back pain, muscle spasm, mood. Has chronic back pain, seeing Dr. Jara. He is scheduled for injections on 8/31. He has not been prescribed any narcs. He tweaked his back yesterday and has been in severe pain. Doesn't have any acute meds to take. Toradol generally helps him. Valium helps as well as he has severe back spasms associated. He would like a short course of pain meds as I have done for him in the past. Mood has been low lately. Trying for disability, living with a friend, no funds. He feels bleak. Just on lexapro 10mg.      The following portions of the patient's history were reviewed and updated as appropriate: allergies, current medications, past medical history, past social history and problem list.    Review of Systems:  General: negative  CV: negative  Respiratory: negative  Neuro: negative  Psych: negative    Objective   Temp 97.2 °F (36.2 °C) (Temporal Artery )   Ht 177.8 cm (70\")   Wt 113 kg (249 lb)   BMI 35.73 kg/m²     Physical Exam   Constitutional: He is oriented to person, place, and time. He appears well-developed and well-nourished.   Pulmonary/Chest: Effort normal.   Neurological: He is alert and oriented to person, place, and time.   Skin: Skin is warm and dry.   Psychiatric: His behavior is normal. Judgment and thought content normal.   Depressed mood   Vitals reviewed.      Assessment/Plan   Raj was seen today for back pain.    Diagnoses and all orders for this visit:    Chronic bilateral low back pain without sciatica  -     ketorolac (TORADOL) injection 30 mg; Inject 30 mg into the appropriate muscle as directed by prescriber Every 6 (Six) Hours As Needed for Moderate Pain .  -     Discussed with pt do not generally do pain meds for those established with pain mgmt, pt understanding    Muscle spasm  -continue current meds    Anxiety and depression  -     escitalopram (LEXAPRO) 20 MG tablet; Take 1 tablet by " mouth Daily. Increased dose  -     Seeing counselor  -     diazePAM (VALIUM) 10 MG tablet; Take 1 tablet by mouth Every 8 (Eight) Hours As Needed for Anxiety.    Panic attack  -     Discontinue: diazePAM (VALIUM) 10 MG tablet; Take 1 tablet by mouth Every 8 (Eight) Hours As Needed for Anxiety.  -     diazePAM (VALIUM) 10 MG tablet; Take 1 tablet by mouth Every 8 (Eight) Hours As Needed for Anxiety.           Answers for HPI/ROS submitted by the patient on 8/16/2018   Back pain  Chronicity: recurrent  Onset: yesterday  Frequency: constantly  Progression since onset: gradually worsening  Pain location: lumbar spine  Pain quality: aching, cramping, shooting, stabbing  Radiates to: left foot, left knee, left thigh, right thigh  Pain - numeric: 7/10  Pain is: the same all the time  Aggravated by: bending, position, sitting, standing, twisting  Stiffness is present: in the morning, at night  abdominal pain: No  bladder incontinence: No  bowel incontinence: No  chest pain: No  dysuria: No  fever: No  headaches: No  leg pain: Yes  numbness: No  paresis: No  paresthesias: Yes  pelvic pain: No  perianal numbness: No  tingling: Yes  weakness: No  weight loss: No  Risk factors: lack of exercise, sedentary lifestyle

## 2018-08-21 ENCOUNTER — OFFICE VISIT (OUTPATIENT)
Dept: INTERNAL MEDICINE | Facility: CLINIC | Age: 53
End: 2018-08-21

## 2018-08-21 VITALS — HEIGHT: 70 IN | TEMPERATURE: 97.6 F | BODY MASS INDEX: 36.02 KG/M2 | WEIGHT: 251.6 LBS

## 2018-08-21 DIAGNOSIS — M54.40 CHRONIC BILATERAL LOW BACK PAIN WITH SCIATICA, SCIATICA LATERALITY UNSPECIFIED: Primary | ICD-10-CM

## 2018-08-21 DIAGNOSIS — M43.16 SPONDYLOLISTHESIS OF LUMBAR REGION: ICD-10-CM

## 2018-08-21 DIAGNOSIS — M15.9 OSTEOARTHRITIS OF MULTIPLE JOINTS, UNSPECIFIED OSTEOARTHRITIS TYPE: ICD-10-CM

## 2018-08-21 DIAGNOSIS — G89.29 CHRONIC BILATERAL LOW BACK PAIN WITH SCIATICA, SCIATICA LATERALITY UNSPECIFIED: Primary | ICD-10-CM

## 2018-08-21 PROCEDURE — 99213 OFFICE O/P EST LOW 20 MIN: CPT | Performed by: INTERNAL MEDICINE

## 2018-08-21 RX ORDER — HYDROCODONE BITARTRATE AND ACETAMINOPHEN 10; 325 MG/1; MG/1
1 TABLET ORAL EVERY 6 HOURS PRN
Qty: 18 TABLET | Refills: 0 | Status: SHIPPED | OUTPATIENT
Start: 2018-08-21 | End: 2018-09-14

## 2018-08-21 NOTE — PROGRESS NOTES
"Subjective   Raj Kuhn is a 52 y.o. male here for follow-up severe back pain. It is chronic, lower back, radiating to each side, down legs, leaning forward makes it better, has facet hypertrophy, spondylolisthesis hx (last CT about 2009) and is seeing pain mgmt. He is set to get injections on Friday with Dr. Jara. He is frustrated because the last week or so back pain has been severe and they havent' helped him with that.       The following portions of the patient's history were reviewed and updated as appropriate: allergies, current medications, past medical history, past social history, past surgical history and problem list.    Review of Systems:  General: negative  Neuro: negative  Psych: depression  MSK: see hpi    Objective   Temp 97.6 °F (36.4 °C) (Temporal Artery )   Ht 177.8 cm (70\")   Wt 114 kg (251 lb 9.6 oz)   BMI 36.10 kg/m²     Physical Exam   Constitutional: He is oriented to person, place, and time. He appears well-developed and well-nourished.   Seems in pain   Pulmonary/Chest: Effort normal.   Neurological: He is alert and oriented to person, place, and time.   Skin: Skin is warm and dry.   Psychiatric: His behavior is normal. Judgment and thought content normal.   Depressed mood   Vitals reviewed.      Assessment/Plan   Raj was seen today for back pain.    Diagnoses and all orders for this visit:    Chronic bilateral low back pain with sciatica, sciatica laterality unspecified  -     Ambulatory Referral to Pain Management  -     HYDROcodone-acetaminophen (NORCO)  MG per tablet; Take 1 tablet by mouth Every 6 (Six) Hours As Needed for Moderate Pain  or Severe Pain .    Spondylolisthesis of lumbar region  -     Ambulatory Referral to Pain Management  -     HYDROcodone-acetaminophen (NORCO)  MG per tablet; Take 1 tablet by mouth Every 6 (Six) Hours As Needed for Moderate Pain  or Severe Pain .    Osteoarthritis of multiple joints, unspecified osteoarthritis type  -     " Ambulatory Referral to Pain Management  -     HYDROcodone-acetaminophen (NORCO)  MG per tablet; Take 1 tablet by mouth Every 6 (Six) Hours As Needed for Moderate Pain  or Severe Pain .

## 2018-08-24 ENCOUNTER — TELEPHONE (OUTPATIENT)
Dept: INTERNAL MEDICINE | Facility: CLINIC | Age: 53
End: 2018-08-24

## 2018-08-24 NOTE — TELEPHONE ENCOUNTER
Patient sts he has been having some pain on his r side just below his rib cage towards his back. No trouble urinating, not seeing any blood in his urine. He thinks maybe a kidney stone but he isn't sure. He had this when he was here this week but it wasn't really bothering him too much.

## 2018-09-05 ENCOUNTER — TELEPHONE (OUTPATIENT)
Dept: INTERNAL MEDICINE | Facility: CLINIC | Age: 53
End: 2018-09-05

## 2018-09-05 NOTE — TELEPHONE ENCOUNTER
KAYLEE stating he hasn't heard anything from Dr. Chu office and it has been almost two weeks. He says he's in tremendous pain and can't seem to get a call back from previous pain mgmt doctor.

## 2018-09-05 NOTE — TELEPHONE ENCOUNTER
He isn't taking appts until end of September. He is more than welcome to call Dr. valentine office himself. It's [email or web address] and the number is on there. He should keep trying his previous pain mgmt office. Tell him I can't really help him with pain medication as I can only do short prescriptions and doing that repeatedly is not a good practice. Some pain mgmt won't even see you if you have many short courses of pain meds. He's going to have to tough it out until he sees a new one or try his old pain mgmt MD.

## 2018-09-06 ENCOUNTER — OFFICE VISIT (OUTPATIENT)
Dept: INTERNAL MEDICINE | Facility: CLINIC | Age: 53
End: 2018-09-06

## 2018-09-06 VITALS — HEIGHT: 70 IN | BODY MASS INDEX: 35.65 KG/M2 | WEIGHT: 249 LBS | TEMPERATURE: 98.2 F

## 2018-09-06 DIAGNOSIS — G89.29 CHRONIC JOINT PAIN: Primary | ICD-10-CM

## 2018-09-06 DIAGNOSIS — M54.50 CHRONIC BILATERAL LOW BACK PAIN WITHOUT SCIATICA: ICD-10-CM

## 2018-09-06 DIAGNOSIS — F41.9 ANXIETY AND DEPRESSION: ICD-10-CM

## 2018-09-06 DIAGNOSIS — G89.29 CHRONIC BILATERAL LOW BACK PAIN WITHOUT SCIATICA: ICD-10-CM

## 2018-09-06 DIAGNOSIS — F32.A ANXIETY AND DEPRESSION: ICD-10-CM

## 2018-09-06 DIAGNOSIS — M25.50 CHRONIC JOINT PAIN: Primary | ICD-10-CM

## 2018-09-06 PROCEDURE — 99214 OFFICE O/P EST MOD 30 MIN: CPT | Performed by: INTERNAL MEDICINE

## 2018-09-06 RX ORDER — OXYCODONE AND ACETAMINOPHEN 10; 325 MG/1; MG/1
0.5 TABLET ORAL EVERY 8 HOURS PRN
Qty: 12 TABLET | Refills: 0 | Status: SHIPPED | OUTPATIENT
Start: 2018-09-06 | End: 2018-09-14

## 2018-09-06 NOTE — PROGRESS NOTES
"Subjective   Raj Kuhn is a 53 y.o. male here for acute exacerbation of chronic pain and depression. Had steroid injection in spine last Friday at pain [facility]. Was fine for 2 days; on Monday he had pain on the left side of his back that was debilitating. He called pain [facility], could not be seen until today. He discussed possible treatment with pain meds with MD but was not given any. At that point he became angry as he is in severe pain and has not had a chronic pain rx. He was told he would not be receiving any pain medication from pain [facility] and that that was not in his medication plans for the future. Pt has had 20+ ortho surgeries. Yesterday he saw his ortho (Dr. Amado in [place]) and had a \"meltdown\" there. He was rx'd ativan. He has had a few rx's for valium as well as his anxiety is very high lately due to increased pain. His pain he rates as \"unbearable\" and the worst he has ever experienced. He would like a new pain [facility] doctor as he feels his current one has written him off. He denies any history of abusing his medications that were prescribed. He has had a variety of stints in PT for various ortho conditions including his back, >6 weeks in duration.     The following portions of the patient's history were reviewed and updated as appropriate: allergies, current medications, past medical history, past social history, past surgical history and problem list.    Review of Systems:  General: negative  Skin: negative  Neuro: sciatica  Psych: A&D, panic attack  MSK: back pain    Objective   Temp 98.2 °F (36.8 °C) (Temporal Artery )   Ht 177.8 cm (70\")   Wt 113 kg (249 lb)   BMI 35.73 kg/m²     Physical Exam   Constitutional: He is oriented to person, place, and time. He appears well-developed and well-nourished.   Pulmonary/Chest: Effort normal.   Neurological: He is alert and oriented to person, place, and time.   Skin: Skin is warm and dry.   Psychiatric: He has a normal mood and affect. His behavior " is normal. Judgment and thought content normal.   Vitals reviewed.      Assessment/Plan   Raj was seen today for back pain.    Diagnoses and all orders for this visit:    Chronic joint pain  -     oxyCODONE-acetaminophen (PERCOCET)  MG per tablet; Take 0.5 tablets by mouth Every 8 (Eight) Hours As Needed for Severe Pain . Short course of pain meds. Discussed with pt that this is not a long-term solution to his pain (though I have always been shelbie about that) but to get him out of acute pain will give course of meds. Pt willing to travel to [place] for pain mgmt or go somewhere else here in [place] so will facilitate that. He has never shown any reason to suspect drug seeking/abuse or diversion. Thai reviewed.    Chronic bilateral low back pain without sciatica  -     oxyCODONE-acetaminophen (PERCOCET)  MG per tablet; Take 0.5 tablets by mouth Every 8 (Eight) Hours As Needed for Severe Pain .    Depression and anxiety  -likely reactive to pain. Continue current meds and hopefully with improving pain will improve mood

## 2018-09-13 RX ORDER — PROMETHAZINE HYDROCHLORIDE 25 MG/1
TABLET ORAL
Qty: 42 TABLET | Refills: 1 | Status: SHIPPED | OUTPATIENT
Start: 2018-09-13 | End: 2018-09-14

## 2018-09-14 ENCOUNTER — OFFICE VISIT (OUTPATIENT)
Dept: INTERNAL MEDICINE | Facility: CLINIC | Age: 53
End: 2018-09-14

## 2018-09-14 VITALS
TEMPERATURE: 97.1 F | WEIGHT: 251.2 LBS | DIASTOLIC BLOOD PRESSURE: 84 MMHG | SYSTOLIC BLOOD PRESSURE: 132 MMHG | HEIGHT: 70 IN | BODY MASS INDEX: 35.96 KG/M2

## 2018-09-14 DIAGNOSIS — F41.8 ANXIETY ABOUT HEALTH: ICD-10-CM

## 2018-09-14 DIAGNOSIS — Z00.00 HEALTH CARE MAINTENANCE: Primary | ICD-10-CM

## 2018-09-14 DIAGNOSIS — Z83.49 FAMILY HISTORY OF HYPOTHYROIDISM: ICD-10-CM

## 2018-09-14 DIAGNOSIS — Z23 INFLUENZA VACCINE ADMINISTERED: ICD-10-CM

## 2018-09-14 DIAGNOSIS — Z11.59 NEED FOR HEPATITIS C SCREENING TEST: ICD-10-CM

## 2018-09-14 DIAGNOSIS — R79.89 LOW TESTOSTERONE IN MALE: ICD-10-CM

## 2018-09-14 PROCEDURE — 90471 IMMUNIZATION ADMIN: CPT | Performed by: INTERNAL MEDICINE

## 2018-09-14 PROCEDURE — 90686 IIV4 VACC NO PRSV 0.5 ML IM: CPT | Performed by: INTERNAL MEDICINE

## 2018-09-14 PROCEDURE — 99396 PREV VISIT EST AGE 40-64: CPT | Performed by: INTERNAL MEDICINE

## 2018-09-14 RX ORDER — DIAZEPAM 10 MG/1
5-10 TABLET ORAL 2 TIMES DAILY PRN
Qty: 14 TABLET | Refills: 0 | Status: SHIPPED | OUTPATIENT
Start: 2018-09-14 | End: 2018-09-25 | Stop reason: SDUPTHER

## 2018-09-14 NOTE — PROGRESS NOTES
Subjective   Raj Kuhn is a 53 y.o. male here for annual exam. He is feeling better than last visit. Continues to have episodic anxiety which really gets him down. He still suffers from chronic back pain, though the prn meds he was given recently have helped tremendously. He is awaiting pain mgmt referral to Dr. Gunter. He has a lesion on his buttock that is recurrent, he thinks it could be shingles. He does have hx of chronic HSV and takes suppressive therapy but does admit to some lapses in treatment.     Review of Systems   Constitutional: Negative.    HENT: Negative.    Eyes: Negative.    Respiratory: Negative.    Cardiovascular: Negative.    Gastrointestinal: Negative.    Endocrine: Negative.    Genitourinary: Negative.    Musculoskeletal: Positive for back pain.        Knee pain, neck pain   Skin: Positive for rash.   Allergic/Immunologic: Negative.    Neurological: Negative.    Hematological: Negative.    Psychiatric/Behavioral: Negative.        History reviewed. No pertinent past medical history.  Family History   Problem Relation Age of Onset   • Arthritis Mother    • Hypertension Mother    • Hypertension Father    • Thyroid disease Father    • Heart attack Brother      Past Surgical History:   Procedure Laterality Date   • HIP SURGERY     • KNEE SURGERY     • SHOULDER SURGERY     • TONSILLECTOMY     • VASECTOMY     • WRIST ARTHROPLASTY       Social History     Social History   • Marital status: Single     Spouse name: N/A   • Number of children: N/A   • Years of education: N/A     Occupational History   • Not on file.     Social History Main Topics   • Smoking status: Never Smoker   • Smokeless tobacco: Never Used   • Alcohol use No   • Drug use: No   • Sexual activity: Not on file     Other Topics Concern   • Not on file     Social History Narrative   • No narrative on file         Current Outpatient Prescriptions:   •  amitriptyline (ELAVIL) 150 MG tablet, Take 1 tablet by mouth Every Evening., Disp:  "30 tablet, Rfl: 5  •  celecoxib (CeleBREX) 200 MG capsule, , Disp: , Rfl:   •  cyclobenzaprine (FLEXERIL) 10 MG tablet, Take 1 tablet by mouth 3 (Three) Times a Day As Needed for Muscle Spasms., Disp: 90 tablet, Rfl: 5  •  escitalopram (LEXAPRO) 20 MG tablet, Take 1 tablet by mouth Daily., Disp: 30 tablet, Rfl: 2  •  gabapentin (NEURONTIN) 600 MG tablet, Take 2 tablets by mouth Every Evening., Disp: 60 tablet, Rfl: 2  •  hydrALAZINE (APRESOLINE) 25 MG tablet, Take 1 tablet by mouth 2 (Two) Times a Day., Disp: 60 tablet, Rfl: 5  •  HYDROcodone-acetaminophen (NORCO)  MG per tablet, Take 1 tablet by mouth Every 6 (Six) Hours As Needed for Moderate Pain  or Severe Pain ., Disp: 18 tablet, Rfl: 0  •  lisinopril (PRINIVIL,ZESTRIL) 40 MG tablet, Take 1 tablet by mouth Daily., Disp: 30 tablet, Rfl: 5  •  metoprolol tartrate (LOPRESSOR) 50 MG tablet, Take 1 tablet by mouth 2 (Two) Times a Day., Disp: 60 tablet, Rfl: 5  •  omeprazole (PRILOSEC) 20 MG capsule, Take 1 capsule by mouth Daily., Disp: 90 capsule, Rfl: 5  •  oxyCODONE-acetaminophen (PERCOCET)  MG per tablet, Take 0.5 tablets by mouth Every 8 (Eight) Hours As Needed for Severe Pain ., Disp: 12 tablet, Rfl: 0  •  promethazine (PHENERGAN) 25 MG tablet, TAKE 1 TABLET BY MOUTH EVERY EIGHT HOURS AS NEEDED FOR NAUSEA AND VOMITING, Disp: 42 tablet, Rfl: 1  •  promethazine (PHENERGAN) 25 MG tablet, TAKE 1 TABLET BY MOUTH EVERY EIGHT HOURS AS NEEDED FOR NAUSEA AND VOMITING, Disp: 42 tablet, Rfl: 1  •  valACYclovir (VALTREX) 1000 MG tablet, Take 1 tablet by mouth Daily., Disp: 30 tablet, Rfl: 5    Objective   /84 (BP Location: Right arm, Patient Position: Sitting, Cuff Size: Large Adult)   Temp 97.1 °F (36.2 °C) (Temporal Artery )   Ht 177.8 cm (70\")   Wt 114 kg (251 lb 3.2 oz)   BMI 36.04 kg/m²   Physical Exam   Constitutional: He is oriented to person, place, and time. He appears well-developed and well-nourished. No distress.   HENT:   Head: " Normocephalic and atraumatic.   Right Ear: Tympanic membrane, external ear and ear canal normal.   Left Ear: Tympanic membrane, external ear and ear canal normal.   Nose: Nose normal.   Mouth/Throat: Oropharynx is clear and moist.   Eyes: Pupils are equal, round, and reactive to light. Conjunctivae and lids are normal. No scleral icterus.   Neck: Neck supple. Carotid bruit is not present. No thyroid mass and no thyromegaly present.   Cardiovascular: Normal rate, regular rhythm, normal heart sounds and intact distal pulses.  Exam reveals no gallop, no S3, no S4 and no friction rub.    No murmur heard.  Pulmonary/Chest: Effort normal and breath sounds normal. No respiratory distress. He has no wheezes. He has no rhonchi. He has no rales.   Abdominal: Soft. Bowel sounds are normal. He exhibits no distension and no mass. There is no hepatosplenomegaly. There is no tenderness.   Musculoskeletal: Normal range of motion.   Lymphadenopathy:     He has no cervical adenopathy.   Neurological: He is alert and oriented to person, place, and time. No cranial nerve deficit or sensory deficit.   Skin: Skin is warm and dry. No rash noted. No erythema. No pallor.   Right buttock with crusted over grouped lesions, about 2cm in diameter   Psychiatric: He has a normal mood and affect. His speech is normal and behavior is normal. Judgment and thought content normal. Cognition and memory are normal.   Vitals reviewed.      Assessment/Plan   Raj was seen today for annual exam.    Diagnoses and all orders for this visit:    Health care maintenance  -     Fluarix/Fluzone/Afluria Quad>6 mo (9900-5504)  -     Cancel: CBC (No Diff)  -     Cancel: Comprehensive Metabolic Panel  -     Cancel: Lipid Panel  -     diazePAM (VALIUM) 10 MG tablet; Take 0.5-1 tablets by mouth 2 (Two) Times a Day As Needed for Anxiety.  -     CBC (No Diff)  -     TSH; Future  -     Comprehensive metabolic panel; Future  -     Hepatitis C antibody; Future  -     Lipid  panel; Future  -     T4, free; Future  -     Testosterone; Future    Influenza vaccine administered  -     Fluarix/Fluzone/Afluria Quad>6 mo (6091-8732)  -     diazePAM (VALIUM) 10 MG tablet; Take 0.5-1 tablets by mouth 2 (Two) Times a Day As Needed for Anxiety.  -     CBC (No Diff)  -     TSH; Future  -     Comprehensive metabolic panel; Future  -     Hepatitis C antibody; Future  -     Lipid panel; Future  -     T4, free; Future  -     Testosterone; Future    Low testosterone in male  -     Fluarix/Fluzone/Afluria Quad>6 mo ([id number])  -     Cancel: Testosterone  -     diazePAM (VALIUM) 10 MG tablet; Take 0.5-1 tablets by mouth 2 (Two) Times a Day As Needed for Anxiety.  -     CBC (No Diff)  -     TSH; Future  -     Comprehensive metabolic panel; Future  -     Hepatitis C antibody; Future  -     Lipid panel; Future  -     T4, free; Future  -     Testosterone; Future    Family history of hypothyroidism  -     Fluarix/Fluzone/Afluria Quad>6 mo ([id number])  -     Cancel: TSH  -     Cancel: T4, Free  -     diazePAM (VALIUM) 10 MG tablet; Take 0.5-1 tablets by mouth 2 (Two) Times a Day As Needed for Anxiety.  -     CBC (No Diff)  -     TSH; Future  -     Comprehensive metabolic panel; Future  -     Hepatitis C antibody; Future  -     Lipid panel; Future  -     T4, free; Future  -     Testosterone; Future    Need for hepatitis C screening test  -     Fluarix/Fluzone/Afluria Quad>6 mo ([id number])  -     Cancel: Hepatitis C Antibody  -     diazePAM (VALIUM) 10 MG tablet; Take 0.5-1 tablets by mouth 2 (Two) Times a Day As Needed for Anxiety.  -     CBC (No Diff)  -     TSH; Future  -     Comprehensive metabolic panel; Future  -     Hepatitis C antibody; Future  -     Lipid panel; Future  -     T4, free; Future  -     Testosterone; Future    Anxiety about health  -     Fluarix/Fluzone/Afluria Quad>6 mo ([id number])  -     diazePAM (VALIUM) 10 MG tablet; Take 0.5-1 tablets by mouth 2 (Two) Times a Day As Needed for  Anxiety.  -     CBC (No Diff)  -     TSH; Future  -     Comprehensive metabolic panel; Future  -     Hepatitis C antibody; Future  -     Lipid panel; Future  -     T4, free; Future  -     Testosterone; Future        1. HCM  -cscope UTD  -fasting labs ordered  -shingrix rec at pharmacy  -flu vaccine today  Reviewed the following with the patient: advised patient of need for:  immunizations discussed and weight loss encouraged.

## 2018-09-19 ENCOUNTER — LAB (OUTPATIENT)
Dept: INTERNAL MEDICINE | Facility: CLINIC | Age: 53
End: 2018-09-19

## 2018-09-19 DIAGNOSIS — Z23 INFLUENZA VACCINE ADMINISTERED: ICD-10-CM

## 2018-09-19 DIAGNOSIS — Z11.59 NEED FOR HEPATITIS C SCREENING TEST: ICD-10-CM

## 2018-09-19 DIAGNOSIS — Z83.49 FAMILY HISTORY OF HYPOTHYROIDISM: ICD-10-CM

## 2018-09-19 DIAGNOSIS — F41.8 ANXIETY ABOUT HEALTH: ICD-10-CM

## 2018-09-19 DIAGNOSIS — Z00.00 HEALTH CARE MAINTENANCE: ICD-10-CM

## 2018-09-19 DIAGNOSIS — R79.89 LOW TESTOSTERONE IN MALE: ICD-10-CM

## 2018-09-19 LAB
ALBUMIN SERPL-MCNC: 4.46 G/DL (ref 3.2–4.8)
ALBUMIN/GLOB SERPL: 1.9 G/DL (ref 1.5–2.5)
ALP SERPL-CCNC: 85 U/L (ref 25–100)
ALT SERPL W P-5'-P-CCNC: 34 U/L (ref 7–40)
ANION GAP SERPL CALCULATED.3IONS-SCNC: 11 MMOL/L (ref 3–11)
ARTICHOKE IGE QN: 136 MG/DL (ref 0–130)
AST SERPL-CCNC: 36 U/L (ref 0–33)
BILIRUB SERPL-MCNC: 0.9 MG/DL (ref 0.3–1.2)
BUN BLD-MCNC: 13 MG/DL (ref 9–23)
BUN/CREAT SERPL: 13.1 (ref 7–25)
CALCIUM SPEC-SCNC: 9.9 MG/DL (ref 8.7–10.4)
CHLORIDE SERPL-SCNC: 97 MMOL/L (ref 99–109)
CHOLEST SERPL-MCNC: 194 MG/DL (ref 0–200)
CO2 SERPL-SCNC: 29 MMOL/L (ref 20–31)
CREAT BLD-MCNC: 0.99 MG/DL (ref 0.6–1.3)
DEPRECATED RDW RBC AUTO: 52.4 FL (ref 37–54)
ERYTHROCYTE [DISTWIDTH] IN BLOOD BY AUTOMATED COUNT: 14.6 % (ref 11.3–14.5)
GFR SERPL CREATININE-BSD FRML MDRD: 79 ML/MIN/1.73
GLOBULIN UR ELPH-MCNC: 2.3 GM/DL
GLUCOSE BLD-MCNC: 81 MG/DL (ref 70–100)
HCT VFR BLD AUTO: 49.6 % (ref 38.9–50.9)
HCV AB SER DONR QL: NORMAL
HDLC SERPL-MCNC: 47 MG/DL (ref 40–60)
HGB BLD-MCNC: 16.6 G/DL (ref 13.1–17.5)
MCH RBC QN AUTO: 32.6 PG (ref 27–31)
MCHC RBC AUTO-ENTMCNC: 33.5 G/DL (ref 32–36)
MCV RBC AUTO: 97.4 FL (ref 80–99)
PLATELET # BLD AUTO: 190 10*3/MM3 (ref 150–450)
PMV BLD AUTO: 10.6 FL (ref 6–12)
POTASSIUM BLD-SCNC: 4.1 MMOL/L (ref 3.5–5.5)
PROT SERPL-MCNC: 6.8 G/DL (ref 5.7–8.2)
RBC # BLD AUTO: 5.09 10*6/MM3 (ref 4.2–5.76)
SODIUM BLD-SCNC: 137 MMOL/L (ref 132–146)
T4 FREE SERPL-MCNC: 1.29 NG/DL (ref 0.89–1.76)
TESTOST SERPL-MCNC: 368.03 NG/DL (ref 86.98–780.1)
TRIGL SERPL-MCNC: 218 MG/DL (ref 0–150)
TSH SERPL DL<=0.05 MIU/L-ACNC: 0.73 MIU/ML (ref 0.35–5.35)
WBC NRBC COR # BLD: 12.81 10*3/MM3 (ref 3.5–10.8)

## 2018-09-19 PROCEDURE — 84439 ASSAY OF FREE THYROXINE: CPT | Performed by: INTERNAL MEDICINE

## 2018-09-19 PROCEDURE — 84443 ASSAY THYROID STIM HORMONE: CPT | Performed by: INTERNAL MEDICINE

## 2018-09-19 PROCEDURE — 85027 COMPLETE CBC AUTOMATED: CPT | Performed by: INTERNAL MEDICINE

## 2018-09-19 PROCEDURE — 80053 COMPREHEN METABOLIC PANEL: CPT | Performed by: INTERNAL MEDICINE

## 2018-09-19 PROCEDURE — 86803 HEPATITIS C AB TEST: CPT | Performed by: INTERNAL MEDICINE

## 2018-09-19 PROCEDURE — 80061 LIPID PANEL: CPT | Performed by: INTERNAL MEDICINE

## 2018-09-19 PROCEDURE — 84403 ASSAY OF TOTAL TESTOSTERONE: CPT | Performed by: INTERNAL MEDICINE

## 2018-09-24 ENCOUNTER — TELEPHONE (OUTPATIENT)
Dept: INTERNAL MEDICINE | Facility: CLINIC | Age: 53
End: 2018-09-24

## 2018-09-25 DIAGNOSIS — R79.89 LOW TESTOSTERONE IN MALE: ICD-10-CM

## 2018-09-25 DIAGNOSIS — F41.8 ANXIETY ABOUT HEALTH: ICD-10-CM

## 2018-09-25 DIAGNOSIS — Z83.49 FAMILY HISTORY OF HYPOTHYROIDISM: ICD-10-CM

## 2018-09-25 DIAGNOSIS — Z11.59 NEED FOR HEPATITIS C SCREENING TEST: ICD-10-CM

## 2018-09-25 DIAGNOSIS — Z23 INFLUENZA VACCINE ADMINISTERED: ICD-10-CM

## 2018-09-25 DIAGNOSIS — Z00.00 HEALTH CARE MAINTENANCE: ICD-10-CM

## 2018-09-25 RX ORDER — DIAZEPAM 10 MG/1
5-10 TABLET ORAL 2 TIMES DAILY PRN
Qty: 14 TABLET | Refills: 0 | Status: SHIPPED | OUTPATIENT
Start: 2018-09-25 | End: 2018-10-05

## 2018-09-27 ENCOUNTER — TELEPHONE (OUTPATIENT)
Dept: INTERNAL MEDICINE | Facility: CLINIC | Age: 53
End: 2018-09-27

## 2018-09-27 NOTE — TELEPHONE ENCOUNTER
KAYLEE stating he is having a lot of trouble with his wrist and hand. He has had surgery before and that doctor no longer takes his inusrance. Asking to have a referral to Dr. Hansen at . His fax number is [phone]

## 2018-09-28 DIAGNOSIS — M25.539 PAIN IN WRIST, UNSPECIFIED LATERALITY: Primary | ICD-10-CM

## 2018-09-28 NOTE — TELEPHONE ENCOUNTER
He has his records and when he spoke with [facility] they just ask that he bring them with him.    Also he was suppose to have a MRI last week that was ordered by Dr. Jara and since he doesn't see him anymore he didn't feel it was right to have it. Asking if you want to order it or wait until he sees Dr. Gunter?

## 2018-09-28 NOTE — TELEPHONE ENCOUNTER
I'll need those records probably before they will see him. Who was his old doctor and can he call and get records faxed here?

## 2018-10-05 ENCOUNTER — OFFICE VISIT (OUTPATIENT)
Dept: INTERNAL MEDICINE | Facility: CLINIC | Age: 53
End: 2018-10-05

## 2018-10-05 VITALS
SYSTOLIC BLOOD PRESSURE: 130 MMHG | DIASTOLIC BLOOD PRESSURE: 72 MMHG | TEMPERATURE: 97.3 F | HEIGHT: 70 IN | BODY MASS INDEX: 36.08 KG/M2 | WEIGHT: 252 LBS

## 2018-10-05 DIAGNOSIS — I10 BENIGN ESSENTIAL HTN: Primary | ICD-10-CM

## 2018-10-05 DIAGNOSIS — F41.8 ANXIETY ABOUT HEALTH: ICD-10-CM

## 2018-10-05 DIAGNOSIS — D72.829 LEUKOCYTOSIS, UNSPECIFIED TYPE: ICD-10-CM

## 2018-10-05 LAB
BASOPHILS # BLD AUTO: 0.03 10*3/MM3 (ref 0–0.2)
BASOPHILS NFR BLD AUTO: 0.2 % (ref 0–1)
DEPRECATED RDW RBC AUTO: 51.6 FL (ref 37–54)
EOSINOPHIL # BLD AUTO: 0.06 10*3/MM3 (ref 0–0.3)
EOSINOPHIL NFR BLD AUTO: 0.5 % (ref 0–3)
ERYTHROCYTE [DISTWIDTH] IN BLOOD BY AUTOMATED COUNT: 14.6 % (ref 11.3–14.5)
HCT VFR BLD AUTO: 49 % (ref 38.9–50.9)
HGB BLD-MCNC: 16.6 G/DL (ref 13.1–17.5)
IMM GRANULOCYTES # BLD: 0.1 10*3/MM3 (ref 0–0.03)
IMM GRANULOCYTES NFR BLD: 0.8 % (ref 0–0.6)
LYMPHOCYTES # BLD AUTO: 2.4 10*3/MM3 (ref 0.6–4.8)
LYMPHOCYTES NFR BLD AUTO: 18.8 % (ref 24–44)
MCH RBC QN AUTO: 33.1 PG (ref 27–31)
MCHC RBC AUTO-ENTMCNC: 33.9 G/DL (ref 32–36)
MCV RBC AUTO: 97.6 FL (ref 80–99)
MONOCYTES # BLD AUTO: 0.8 10*3/MM3 (ref 0–1)
MONOCYTES NFR BLD AUTO: 6.3 % (ref 0–12)
NEUTROPHILS # BLD AUTO: 9.51 10*3/MM3 (ref 1.5–8.3)
NEUTROPHILS NFR BLD AUTO: 74.2 % (ref 41–71)
PLATELET # BLD AUTO: 213 10*3/MM3 (ref 150–450)
PMV BLD AUTO: 10.5 FL (ref 6–12)
RBC # BLD AUTO: 5.02 10*6/MM3 (ref 4.2–5.76)
WBC NRBC COR # BLD: 12.8 10*3/MM3 (ref 3.5–10.8)

## 2018-10-05 PROCEDURE — 99213 OFFICE O/P EST LOW 20 MIN: CPT | Performed by: INTERNAL MEDICINE

## 2018-10-05 PROCEDURE — 85025 COMPLETE CBC W/AUTO DIFF WBC: CPT | Performed by: INTERNAL MEDICINE

## 2018-10-05 RX ORDER — HYDROCODONE BITARTRATE AND ACETAMINOPHEN 10; 325 MG/1; MG/1
1 TABLET ORAL EVERY 8 HOURS PRN
COMMUNITY
End: 2019-08-06

## 2018-10-05 RX ORDER — HYDROCHLOROTHIAZIDE 25 MG/1
25 TABLET ORAL DAILY
Qty: 30 TABLET | Refills: 5 | Status: SHIPPED | OUTPATIENT
Start: 2018-10-05 | End: 2018-10-19

## 2018-10-05 RX ORDER — DIAZEPAM 10 MG/1
5-10 TABLET ORAL DAILY PRN
Qty: 14 TABLET | Refills: 0 | Status: SHIPPED | OUTPATIENT
Start: 2018-10-05 | End: 2018-10-19 | Stop reason: SDUPTHER

## 2018-10-08 ENCOUNTER — OFFICE VISIT (OUTPATIENT)
Dept: INTERNAL MEDICINE | Facility: CLINIC | Age: 53
End: 2018-10-08

## 2018-10-08 VITALS — WEIGHT: 258.8 LBS | BODY MASS INDEX: 37.05 KG/M2 | TEMPERATURE: 97.7 F | HEIGHT: 70 IN

## 2018-10-08 DIAGNOSIS — H92.02 LEFT EAR PAIN: ICD-10-CM

## 2018-10-08 DIAGNOSIS — J02.9 ACUTE PHARYNGITIS, UNSPECIFIED ETIOLOGY: Primary | ICD-10-CM

## 2018-10-08 DIAGNOSIS — H61.22 IMPACTED CERUMEN OF LEFT EAR: ICD-10-CM

## 2018-10-08 LAB
EXPIRATION DATE: NORMAL
INTERNAL CONTROL: NORMAL
Lab: NORMAL
S PYO AG THROAT QL: NEGATIVE

## 2018-10-08 PROCEDURE — 87880 STREP A ASSAY W/OPTIC: CPT | Performed by: INTERNAL MEDICINE

## 2018-10-08 PROCEDURE — 99213 OFFICE O/P EST LOW 20 MIN: CPT | Performed by: INTERNAL MEDICINE

## 2018-10-08 PROCEDURE — 69209 REMOVE IMPACTED EAR WAX UNI: CPT | Performed by: INTERNAL MEDICINE

## 2018-10-08 RX ORDER — AMOXICILLIN 500 MG/1
500 CAPSULE ORAL 2 TIMES DAILY
Qty: 10 CAPSULE | Refills: 0 | Status: SHIPPED | OUTPATIENT
Start: 2018-10-08 | End: 2018-10-19

## 2018-10-08 NOTE — PROGRESS NOTES
"Subjective   Raj Kuhn is a 53 y.o. male here for sore throat, cough, wheezing, left ear pain. Sx have been present since Saturday. He has dry scratchy throat, dry severe cough, wheezing last night, fatigue, no fever or chills. Hasn't taken anything for it OTC. Left ear pain is severe, stabbing, nothing makes worse or better. No hearing loss, drainage from ear, tinnitus.    PMH: reviewed  Meds: reviewed    Review of Systems   Constitutional: Positive for fatigue. Negative for chills and fever.   HENT: Positive for ear pain. Negative for congestion, ear discharge, hearing loss, rhinorrhea, sinus pressure and tinnitus.    Respiratory: Positive for cough, chest tightness, shortness of breath and wheezing.          Objective   Temp 97.7 °F (36.5 °C) (Temporal Artery )   Ht 177.8 cm (70\")   Wt 117 kg (258 lb 12.8 oz)   BMI 37.13 kg/m²     Physical Exam   Constitutional: He is oriented to person, place, and time. He appears well-developed and well-nourished.   HENT:   Right Ear: Tympanic membrane and ear canal normal.   Left Ear: Tympanic membrane is erythematous. Tympanic membrane is not bulging.   Left full cerumen impaction and TM not visualized. S/P ear lavage with water and instrumentation most cerumen removed and TM visualized was erythematous but not bulging and no discharge seen   Cardiovascular: Normal rate, regular rhythm and normal heart sounds.    Pulmonary/Chest: Effort normal and breath sounds normal. He has no wheezes. He has no rales.   Neurological: He is alert and oriented to person, place, and time.   Skin: Skin is warm and dry.   Psychiatric: He has a normal mood and affect. His behavior is normal. Thought content normal.   Vitals reviewed.      Ear Cerumen Removal Instrumentation  Date/Time: 10/8/2018 11:45 AM  Performed by: RICK MUKHERJEE  Authorized by: RICK MUKHERJEE   Consent: Verbal consent obtained.  Consent given by: patient  Patient understanding: patient " states understanding of the procedure being performed  Patient consent: the patient's understanding of the procedure matches consent given  Procedure consent: procedure consent matches procedure scheduled  Comments: Partial cerumen disimpaction left ear, pt tolerated well      Ear lavage with instrumentation and water performed      Assessment/Plan   Raj was seen today for cough.    Diagnoses and all orders for this visit:    Acute pharyngitis, unspecified etiology  -     POC Rapid Strep A: negative  -     amoxicillin (AMOXIL) 500 MG capsule; Take 1 capsule by mouth 2 (Two) Times a Day.    Left ear pain  -     POC Rapid Strep A  -     amoxicillin (AMOXIL) 500 MG capsule; Take 1 capsule by mouth 2 (Two) Times a Day.    Left cerumen impaction  -complete obstruction prior to lavage/instrumentation, partial clearance of cerumen

## 2018-10-13 DIAGNOSIS — I10 BENIGN ESSENTIAL HTN: ICD-10-CM

## 2018-10-15 RX ORDER — HYDRALAZINE HYDROCHLORIDE 25 MG/1
TABLET, FILM COATED ORAL
Qty: 60 TABLET | Refills: 4 | Status: SHIPPED | OUTPATIENT
Start: 2018-10-15 | End: 2019-03-12 | Stop reason: SDUPTHER

## 2018-10-15 RX ORDER — LISINOPRIL 40 MG/1
TABLET ORAL
Qty: 30 TABLET | Refills: 4 | Status: SHIPPED | OUTPATIENT
Start: 2018-10-15 | End: 2019-03-12 | Stop reason: SDUPTHER

## 2018-10-15 RX ORDER — PROMETHAZINE HYDROCHLORIDE 25 MG/1
TABLET ORAL
Qty: 42 TABLET | Refills: 1 | Status: SHIPPED | OUTPATIENT
Start: 2018-10-15 | End: 2018-10-31

## 2018-10-15 RX ORDER — METOPROLOL TARTRATE 50 MG/1
TABLET, FILM COATED ORAL
Qty: 60 TABLET | Refills: 4 | Status: SHIPPED | OUTPATIENT
Start: 2018-10-15 | End: 2018-10-19

## 2018-10-19 ENCOUNTER — OFFICE VISIT (OUTPATIENT)
Dept: INTERNAL MEDICINE | Facility: CLINIC | Age: 53
End: 2018-10-19

## 2018-10-19 VITALS
OXYGEN SATURATION: 99 % | BODY MASS INDEX: 37.08 KG/M2 | SYSTOLIC BLOOD PRESSURE: 90 MMHG | DIASTOLIC BLOOD PRESSURE: 60 MMHG | RESPIRATION RATE: 20 BRPM | HEIGHT: 70 IN | HEART RATE: 86 BPM | WEIGHT: 259 LBS

## 2018-10-19 DIAGNOSIS — F32.A ANXIETY AND DEPRESSION: ICD-10-CM

## 2018-10-19 DIAGNOSIS — F41.0 PANIC ATTACK: ICD-10-CM

## 2018-10-19 DIAGNOSIS — M25.50 CHRONIC JOINT PAIN: ICD-10-CM

## 2018-10-19 DIAGNOSIS — I10 BENIGN ESSENTIAL HTN: Primary | ICD-10-CM

## 2018-10-19 DIAGNOSIS — F41.9 ANXIETY AND DEPRESSION: ICD-10-CM

## 2018-10-19 DIAGNOSIS — F41.8 ANXIETY ABOUT HEALTH: ICD-10-CM

## 2018-10-19 DIAGNOSIS — G89.29 CHRONIC JOINT PAIN: ICD-10-CM

## 2018-10-19 PROCEDURE — 99214 OFFICE O/P EST MOD 30 MIN: CPT | Performed by: INTERNAL MEDICINE

## 2018-10-19 RX ORDER — DIAZEPAM 10 MG/1
5-10 TABLET ORAL 2 TIMES DAILY PRN
Qty: 60 TABLET | Refills: 2 | Status: SHIPPED | OUTPATIENT
Start: 2018-10-19 | End: 2019-01-03 | Stop reason: SDUPTHER

## 2018-10-19 RX ORDER — LIDOCAINE AND PRILOCAINE 25; 25 MG/G; MG/G
CREAM TOPICAL
COMMUNITY
Start: 2018-10-16 | End: 2019-08-06

## 2018-10-19 NOTE — PROGRESS NOTES
"Subjective   Raj Kuhn is a 53 y.o. male here for follow-up HTN, anxiety, panic attack. HTN: thinks he accidentally threw away his HCTZ so hasn't taken it in 2 days. He says dizziness he used to have has improved drastically. He has continued on his lisinopril. Overall pain is down on his pain meds. He does note high anxiety and stress bordering on panic attack. When he takes the valium he feels much more stable and does not have panic attacks. It calms him so he is able to deal with life in a better way. He is overall feeling much better with his health.     The following portions of the patient's history were reviewed and updated as appropriate: allergies, current medications, past medical history, past social history and problem list.    Review of Systems:  General: negative  CV: negative  Respiratory: negative  Neuro: negative  Psych: see hpi  MSK: improved multi-joint pain    Objective   BP 90/60   Pulse 86   Resp 20   Ht 177.8 cm (70\")   Wt 117 kg (259 lb)   SpO2 99%   BMI 37.16 kg/m²    Rechecked personally 110/72    Physical Exam   Constitutional: He is oriented to person, place, and time. He appears well-developed and well-nourished.   Pulmonary/Chest: Effort normal.   Neurological: He is alert and oriented to person, place, and time.   Skin: Skin is warm and dry.   Psychiatric: He has a normal mood and affect. His behavior is normal. Judgment and thought content normal.   Vitals reviewed.      Assessment/Plan   Raj was seen today for med refill and elevated blood pressure.    Diagnoses and all orders for this visit:    Benign essential HTN  -BP low today off HCTZ and metoprolol. Continue with lisinopril alone and recheck BP again in 2 weeks    Anxiety and depression  -     diazePAM (VALIUM) 10 MG tablet; Take 0.5-1 tablets by mouth 2 (Two) Times a Day As Needed for Anxiety.  -     Controlled substance agreement previously done, drugs of abuse swab done today  The patient has read and signed " the [facility] Controlled Substance Contract.  I will continue to see patient for regular follow up appointments.  They are well controlled on their medication.  BENJAMIN is updated every 3 months. The patient is aware of the potential for addiction and dependence.  -does receive gabapentin and opiate from pain mgmt    Panic attack  -resolved on valium    Chronic joint pain  -improved on opiates, progress has been good

## 2018-10-31 ENCOUNTER — OFFICE VISIT (OUTPATIENT)
Dept: INTERNAL MEDICINE | Facility: CLINIC | Age: 53
End: 2018-10-31

## 2018-10-31 VITALS
BODY MASS INDEX: 36.08 KG/M2 | WEIGHT: 252 LBS | HEIGHT: 70 IN | SYSTOLIC BLOOD PRESSURE: 130 MMHG | DIASTOLIC BLOOD PRESSURE: 70 MMHG | TEMPERATURE: 97.4 F

## 2018-10-31 DIAGNOSIS — I10 BENIGN ESSENTIAL HTN: Primary | ICD-10-CM

## 2018-10-31 DIAGNOSIS — E66.01 MORBIDLY OBESE (HCC): ICD-10-CM

## 2018-10-31 PROCEDURE — 99213 OFFICE O/P EST LOW 20 MIN: CPT | Performed by: INTERNAL MEDICINE

## 2018-10-31 NOTE — PROGRESS NOTES
"Subjective   Raj Kuhn is a 53 y.o. male here for follow-up HTN. He is on lisinopril and hydralazine and has had several dose adjustments recently to get him to goal. Denies any issues with hypotension or SE to meds. Doing overall well and compared to a month ago feels so much better.       The following portions of the patient's history were reviewed and updated as appropriate: allergies, current medications, past medical history, past social history and problem list.    Review of Systems:  General: negative  CV: negative  Respiratory: negative  Psych: negative    Objective   /70 (BP Location: Left arm, Patient Position: Sitting, Cuff Size: Adult)   Temp 97.4 °F (36.3 °C) (Temporal Artery )   Ht 177.8 cm (70\")   Wt 114 kg (252 lb)   BMI 36.16 kg/m²     Physical Exam   Constitutional: He is oriented to person, place, and time. He appears well-developed and well-nourished.   Pulmonary/Chest: Effort normal.   Neurological: He is alert and oriented to person, place, and time.   Skin: Skin is warm and dry.   Psychiatric: He has a normal mood and affect. His behavior is normal. Judgment and thought content normal.   Vitals reviewed.      Assessment/Plan   Raj was seen today for depression and anxiety.    Diagnoses and all orders for this visit:    Benign essential HTN  -better control, continue current meds  -take BP at home, report if elevated         "

## 2018-11-06 ENCOUNTER — OFFICE VISIT (OUTPATIENT)
Dept: INTERNAL MEDICINE | Facility: CLINIC | Age: 53
End: 2018-11-06

## 2018-11-06 VITALS — TEMPERATURE: 97.8 F | HEIGHT: 70 IN | WEIGHT: 252 LBS | BODY MASS INDEX: 36.08 KG/M2

## 2018-11-06 DIAGNOSIS — J06.9 ACUTE URI: Primary | ICD-10-CM

## 2018-11-06 LAB
EXPIRATION DATE: NORMAL
FLUAV AG NPH QL: NORMAL
FLUBV AG NPH QL: NORMAL
INTERNAL CONTROL: NORMAL
Lab: NORMAL

## 2018-11-06 PROCEDURE — 87804 INFLUENZA ASSAY W/OPTIC: CPT | Performed by: INTERNAL MEDICINE

## 2018-11-06 PROCEDURE — 99213 OFFICE O/P EST LOW 20 MIN: CPT | Performed by: INTERNAL MEDICINE

## 2018-11-06 NOTE — PROGRESS NOTES
"Subjective   Raj Kuhn is a 53 y.o. male here for feeling ill last 2 days. Has severe body aches, cough, fatigue, felt hot but no documented fever, sinus pressure. No flu exposure that he is aware of. He has not taken anything OTC. Just feels very painful all over his body, wants to make sure he doesn't have the flu.    PMH: reviewed  Meds: reviewed    Review of Systems   Constitutional: Positive for activity change, fatigue and fever. Negative for chills.   HENT: Positive for congestion, postnasal drip and sinus pressure. Negative for ear discharge, ear pain, rhinorrhea, sneezing and sore throat.    Respiratory: Positive for cough. Negative for shortness of breath and wheezing.    Gastrointestinal: Negative.    Musculoskeletal: Positive for myalgias.   Skin: Negative.          Objective   Temp 97.8 °F (36.6 °C) (Temporal Artery )   Ht 177.8 cm (70\")   Wt 114 kg (252 lb)   BMI 36.16 kg/m²     Physical Exam   Constitutional: He is oriented to person, place, and time. He appears well-developed and well-nourished.   Cardiovascular: Normal rate, regular rhythm and normal heart sounds.    Pulmonary/Chest: Effort normal and breath sounds normal. He has no wheezes. He has no rales.   Neurological: He is alert and oriented to person, place, and time.   Skin: Skin is warm and dry.   Psychiatric: He has a normal mood and affect. His behavior is normal. Thought content normal.   Vitals reviewed.      Assessment/Plan   Raj was seen today for headache and generalized body aches.    Diagnoses and all orders for this visit:    Acute URI  -     POCT Influenza A/B: negative. Likely other viral cause. Supportive care. Discussed with pt.           "

## 2018-11-09 DIAGNOSIS — F41.9 ANXIETY AND DEPRESSION: ICD-10-CM

## 2018-11-09 DIAGNOSIS — F32.A ANXIETY AND DEPRESSION: ICD-10-CM

## 2018-11-12 ENCOUNTER — TELEPHONE (OUTPATIENT)
Dept: INTERNAL MEDICINE | Facility: CLINIC | Age: 53
End: 2018-11-12

## 2018-11-12 DIAGNOSIS — I10 BENIGN ESSENTIAL HTN: ICD-10-CM

## 2018-11-12 RX ORDER — PROMETHAZINE HYDROCHLORIDE 25 MG/1
TABLET ORAL
Qty: 42 TABLET | Refills: 1 | Status: SHIPPED | OUTPATIENT
Start: 2018-11-12 | End: 2019-04-01 | Stop reason: SDUPTHER

## 2018-11-12 RX ORDER — METOPROLOL TARTRATE 50 MG/1
TABLET, FILM COATED ORAL
Qty: 60 TABLET | Refills: 4 | Status: SHIPPED | OUTPATIENT
Start: 2018-11-12 | End: 2018-11-12

## 2018-11-12 RX ORDER — AMITRIPTYLINE HYDROCHLORIDE 150 MG/1
TABLET, FILM COATED ORAL
Qty: 30 TABLET | Refills: 4 | Status: SHIPPED | OUTPATIENT
Start: 2018-11-12 | End: 2019-04-11 | Stop reason: SDUPTHER

## 2018-11-12 NOTE — TELEPHONE ENCOUNTER
Pt fell on his right hip a few weeks ago, and pain just keeps getting worse and worse at night, during the day it is fine. Pt doesn't know if he needs to come in for an appt, or what you and Dr. Zimmer think.

## 2018-11-13 ENCOUNTER — CLINICAL SUPPORT (OUTPATIENT)
Dept: INTERNAL MEDICINE | Facility: CLINIC | Age: 53
End: 2018-11-13

## 2018-11-13 DIAGNOSIS — M54.5 BILATERAL LOW BACK PAIN, UNSPECIFIED CHRONICITY, WITH SCIATICA PRESENCE UNSPECIFIED: ICD-10-CM

## 2018-11-13 PROCEDURE — 96372 THER/PROPH/DIAG INJ SC/IM: CPT | Performed by: INTERNAL MEDICINE

## 2018-11-13 RX ORDER — KETOROLAC TROMETHAMINE 30 MG/ML
30 INJECTION, SOLUTION INTRAMUSCULAR; INTRAVENOUS EVERY 6 HOURS PRN
Status: SHIPPED | OUTPATIENT
Start: 2018-11-13 | End: 2018-11-18

## 2018-11-13 RX ADMIN — KETOROLAC TROMETHAMINE 30 MG: 30 INJECTION, SOLUTION INTRAMUSCULAR; INTRAVENOUS at 14:50

## 2018-11-20 ENCOUNTER — TELEPHONE (OUTPATIENT)
Dept: INTERNAL MEDICINE | Facility: CLINIC | Age: 53
End: 2018-11-20

## 2018-11-20 NOTE — TELEPHONE ENCOUNTER
Pt said that the shot did not work. Pt said that he is still in pain. Pt wants you to call him back

## 2018-11-20 NOTE — TELEPHONE ENCOUNTER
Sepideh had been talking to him and I just couldn't get a good story. I told him he should talk with pain mgmt about it but he was wanting to see ortho. He sees Dr. Marcano in [place] as well as a local ortho. What does he want me to do?

## 2018-12-12 DIAGNOSIS — I10 BENIGN ESSENTIAL HTN: ICD-10-CM

## 2018-12-12 RX ORDER — PROMETHAZINE HYDROCHLORIDE 25 MG/1
TABLET ORAL
Qty: 42 TABLET | Refills: 1 | Status: SHIPPED | OUTPATIENT
Start: 2018-12-12 | End: 2019-04-01 | Stop reason: SDUPTHER

## 2018-12-12 RX ORDER — METOPROLOL TARTRATE 50 MG/1
TABLET, FILM COATED ORAL
Qty: 60 TABLET | Refills: 4 | Status: SHIPPED | OUTPATIENT
Start: 2018-12-12 | End: 2019-05-13

## 2018-12-23 DIAGNOSIS — F32.A ANXIETY AND DEPRESSION: ICD-10-CM

## 2018-12-23 DIAGNOSIS — F41.9 ANXIETY AND DEPRESSION: ICD-10-CM

## 2018-12-24 RX ORDER — ESCITALOPRAM OXALATE 20 MG/1
TABLET ORAL
Qty: 30 TABLET | Refills: 1 | Status: SHIPPED | OUTPATIENT
Start: 2018-12-24 | End: 2019-05-13

## 2019-01-03 DIAGNOSIS — F41.8 ANXIETY ABOUT HEALTH: ICD-10-CM

## 2019-01-03 NOTE — TELEPHONE ENCOUNTER
Pt is needing his valium refilled. Doesn't know if he needs to see Dr. Zimmer or not. Please advise. Thank you.

## 2019-01-04 RX ORDER — DIAZEPAM 10 MG/1
5-10 TABLET ORAL 2 TIMES DAILY PRN
Qty: 60 TABLET | Refills: 2 | Status: SHIPPED | OUTPATIENT
Start: 2019-01-04 | End: 2019-03-14 | Stop reason: SDUPTHER

## 2019-01-09 ENCOUNTER — TELEPHONE (OUTPATIENT)
Dept: INTERNAL MEDICINE | Facility: CLINIC | Age: 54
End: 2019-01-09

## 2019-01-09 NOTE — TELEPHONE ENCOUNTER
PT WOULD LIKE FOR YOU TO GIVE HIM A CALL TO DISCUSS PAPERWORK THAT IS GOING TO BE SENT TO YOU FROM [employer].

## 2019-01-11 DIAGNOSIS — M25.50 CHRONIC JOINT PAIN: ICD-10-CM

## 2019-01-11 DIAGNOSIS — G89.29 CHRONIC JOINT PAIN: ICD-10-CM

## 2019-01-11 RX ORDER — CYCLOBENZAPRINE HCL 10 MG
TABLET ORAL
Qty: 90 TABLET | Refills: 4 | Status: SHIPPED | OUTPATIENT
Start: 2019-01-11 | End: 2019-06-07 | Stop reason: SDUPTHER

## 2019-01-11 NOTE — TELEPHONE ENCOUNTER
Advised patient we have the paperwork and if she is unable to complete I would let him know otherwise I would fax them and mail him the original.

## 2019-01-23 ENCOUNTER — OFFICE VISIT (OUTPATIENT)
Dept: INTERNAL MEDICINE | Facility: CLINIC | Age: 54
End: 2019-01-23

## 2019-01-23 VITALS
WEIGHT: 244.6 LBS | HEIGHT: 70 IN | BODY MASS INDEX: 35.02 KG/M2 | DIASTOLIC BLOOD PRESSURE: 74 MMHG | TEMPERATURE: 97.5 F | SYSTOLIC BLOOD PRESSURE: 122 MMHG

## 2019-01-23 DIAGNOSIS — E66.01 MORBIDLY OBESE (HCC): ICD-10-CM

## 2019-01-23 DIAGNOSIS — M25.50 CHRONIC JOINT PAIN: Primary | ICD-10-CM

## 2019-01-23 DIAGNOSIS — G89.29 CHRONIC JOINT PAIN: Primary | ICD-10-CM

## 2019-01-23 PROCEDURE — 99213 OFFICE O/P EST LOW 20 MIN: CPT | Performed by: INTERNAL MEDICINE

## 2019-01-23 NOTE — PROGRESS NOTES
"Subjective   Raj Kuhn is a 53 y.o. male here for follow-up chronic joint pain. He is seeing pain mgmt and doing well there. Is wearing a right knee brace as he has severe OA in the knee. He sees Dr. Marcano in [place] who has done multiple other ortho surgeries for him. He has a \"clean out\" planned next month. He brings medical frailty forms to be filled out. Otherwise he is doing well.      The following portions of the patient's history were reviewed and updated as appropriate: allergies, current medications, past medical history, past social history, past surgical history and problem list.    Review of Systems:  General: negative  Neuro: neuropathy  MSK: knee pain    Objective   /74 (BP Location: Left arm, Patient Position: Sitting, Cuff Size: Adult)   Temp 97.5 °F (36.4 °C) (Temporal)   Ht 177.8 cm (70\")   Wt 111 kg (244 lb 9.6 oz)   BMI 35.10 kg/m²     Physical Exam   Constitutional: He is oriented to person, place, and time. He appears well-developed and well-nourished.   Pulmonary/Chest: Effort normal.   Musculoskeletal:   Right knee in brace, abnormal gait   Neurological: He is alert and oriented to person, place, and time.   Skin: Skin is warm and dry.   Psychiatric: He has a normal mood and affect. His behavior is normal. Judgment and thought content normal.   Vitals reviewed.      Assessment/Plan   Raj was seen today for follow-up.    Diagnoses and all orders for this visit:    Chronic joint pain  -seeing pain clinic, doing well on current regimen  -has knee laparoscopy planned next month with Dr. Marcano  -filled out medical frailty forms, will fax in as they were completed today    Morbidly obese (CMS/[facility])  BMI 35.1         "

## 2019-02-10 DIAGNOSIS — I10 BENIGN ESSENTIAL HTN: ICD-10-CM

## 2019-02-11 RX ORDER — PROMETHAZINE HYDROCHLORIDE 25 MG/1
TABLET ORAL
Qty: 42 TABLET | Refills: 1 | Status: SHIPPED | OUTPATIENT
Start: 2019-02-11 | End: 2019-04-01 | Stop reason: SDUPTHER

## 2019-02-11 RX ORDER — METOPROLOL TARTRATE 50 MG/1
TABLET, FILM COATED ORAL
Qty: 60 TABLET | Refills: 4 | Status: SHIPPED | OUTPATIENT
Start: 2019-02-11 | End: 2019-04-01 | Stop reason: SDUPTHER

## 2019-03-12 DIAGNOSIS — I10 BENIGN ESSENTIAL HTN: ICD-10-CM

## 2019-03-12 DIAGNOSIS — F41.9 ANXIETY AND DEPRESSION: ICD-10-CM

## 2019-03-12 DIAGNOSIS — F32.A ANXIETY AND DEPRESSION: ICD-10-CM

## 2019-03-12 RX ORDER — LISINOPRIL 40 MG/1
TABLET ORAL
Qty: 30 TABLET | Refills: 3 | Status: SHIPPED | OUTPATIENT
Start: 2019-03-12 | End: 2019-06-21 | Stop reason: SDDI

## 2019-03-12 RX ORDER — HYDRALAZINE HYDROCHLORIDE 25 MG/1
TABLET, FILM COATED ORAL
Qty: 60 TABLET | Refills: 3 | Status: SHIPPED | OUTPATIENT
Start: 2019-03-12 | End: 2019-07-10 | Stop reason: SDUPTHER

## 2019-03-12 RX ORDER — PROMETHAZINE HYDROCHLORIDE 25 MG/1
TABLET ORAL
Qty: 42 TABLET | Refills: 1 | Status: SHIPPED | OUTPATIENT
Start: 2019-03-12 | End: 2019-04-01 | Stop reason: SDUPTHER

## 2019-03-12 RX ORDER — ESCITALOPRAM OXALATE 20 MG/1
TABLET ORAL
Qty: 30 TABLET | Refills: 1 | Status: SHIPPED | OUTPATIENT
Start: 2019-03-12 | End: 2019-04-01 | Stop reason: SDUPTHER

## 2019-03-14 DIAGNOSIS — F41.8 ANXIETY ABOUT HEALTH: ICD-10-CM

## 2019-03-14 RX ORDER — DIAZEPAM 10 MG/1
5-10 TABLET ORAL 2 TIMES DAILY PRN
Qty: 60 TABLET | Refills: 2 | Status: SHIPPED | OUTPATIENT
Start: 2019-03-14 | End: 2019-12-30

## 2019-04-01 ENCOUNTER — OFFICE VISIT (OUTPATIENT)
Dept: INTERNAL MEDICINE | Facility: CLINIC | Age: 54
End: 2019-04-01

## 2019-04-01 VITALS
TEMPERATURE: 97.7 F | SYSTOLIC BLOOD PRESSURE: 162 MMHG | WEIGHT: 252.4 LBS | DIASTOLIC BLOOD PRESSURE: 90 MMHG | HEIGHT: 70 IN | BODY MASS INDEX: 36.13 KG/M2

## 2019-04-01 DIAGNOSIS — F41.9 ANXIETY AND DEPRESSION: Primary | ICD-10-CM

## 2019-04-01 DIAGNOSIS — Z12.83 SKIN CANCER SCREENING: ICD-10-CM

## 2019-04-01 DIAGNOSIS — F32.A ANXIETY AND DEPRESSION: Primary | ICD-10-CM

## 2019-04-01 DIAGNOSIS — I10 BENIGN ESSENTIAL HTN: ICD-10-CM

## 2019-04-01 DIAGNOSIS — M54.5 ACUTE BILATERAL LOW BACK PAIN, WITH SCIATICA PRESENCE UNSPECIFIED: ICD-10-CM

## 2019-04-01 PROCEDURE — 99214 OFFICE O/P EST MOD 30 MIN: CPT | Performed by: INTERNAL MEDICINE

## 2019-04-01 PROCEDURE — 96372 THER/PROPH/DIAG INJ SC/IM: CPT | Performed by: INTERNAL MEDICINE

## 2019-04-01 RX ORDER — KETOROLAC TROMETHAMINE 30 MG/ML
30 INJECTION, SOLUTION INTRAMUSCULAR; INTRAVENOUS ONCE
Status: COMPLETED | OUTPATIENT
Start: 2019-04-01 | End: 2019-04-01

## 2019-04-01 RX ADMIN — KETOROLAC TROMETHAMINE 30 MG: 30 INJECTION, SOLUTION INTRAMUSCULAR; INTRAVENOUS at 16:03

## 2019-04-01 NOTE — PROGRESS NOTES
"Subjective   Raj Kuhn is a 53 y.o. male here for follow-up A&D, HTN, chronic low back pain. A&D: on lexapro and feels he does pretty well with that. He does endorse depressive sx still. Has court case for disability in June so that is weighing on him. He cannot work, doesn't do much during the day. He does see a counselor. He is thinking of getting a cat. HTN: taking BP med as prescribed, BP has not been this high routinely. Did take med this am. He is in quite a bit of pain in his low back today. He goes to pain mgmt and is on opiate therapy. He was doing more activity than normal and lifting and has a deep ache in the right lumbar area/buttock. Since last visit he has had right knee arthroscopy and may have to have total replacement.      The following portions of the patient's history were reviewed and updated as appropriate: allergies, current medications, past medical history, past social history and problem list.    Review of Systems:  General: negative  CV: negative  Respiratory: negative  Neuro: negative  Psych: A&D  MSK: back pain, knee pain    Objective   /90 (BP Location: Left arm, Patient Position: Sitting, Cuff Size: Large Adult)   Temp 97.7 °F (36.5 °C) (Temporal)   Ht 177.8 cm (70\")   Wt 114 kg (252 lb 6.4 oz)   BMI 36.22 kg/m²     Physical Exam   Constitutional: He is oriented to person, place, and time. He appears well-developed and well-nourished. No distress.   Appears tired   Cardiovascular: Normal rate, regular rhythm and normal heart sounds.   Pulmonary/Chest: Effort normal and breath sounds normal. He has no wheezes. He has no rales.   Neurological: He is alert and oriented to person, place, and time.   Skin: Skin is warm and dry.   Right knee arthroscopy scars   Psychiatric: He has a normal mood and affect. His behavior is normal. Thought content normal.   Vitals reviewed.      Assessment/Plan   Raj was seen today for hypertension, anxiety and depression.    Diagnoses and " all orders for this visit:    Anxiety and depression  -taking med and in counseling, still with depressive sx likely related to his disability. Encouraged him to find a hobby and get a cat    Benign essential HTN  -high today but pt in severe pain from increased activity. Will have him check it at home    Acute bilateral low back pain, with sciatica presence unspecified  -     ketorolac (TORADOL) injection 30 mg    Skin cancer screening  -     Ambulatory Referral to Dermatology

## 2019-04-10 ENCOUNTER — TELEPHONE (OUTPATIENT)
Dept: INTERNAL MEDICINE | Facility: CLINIC | Age: 54
End: 2019-04-10

## 2019-04-10 NOTE — TELEPHONE ENCOUNTER
Pt needs a PA for his valium. Pt wants you to call him back when the PA is done because he will be able to go back to the pharmacy to get his money back if it is within 6 days.

## 2019-04-11 DIAGNOSIS — K21.9 GASTROESOPHAGEAL REFLUX DISEASE, ESOPHAGITIS PRESENCE NOT SPECIFIED: ICD-10-CM

## 2019-04-11 DIAGNOSIS — F41.9 ANXIETY AND DEPRESSION: ICD-10-CM

## 2019-04-11 DIAGNOSIS — I10 BENIGN ESSENTIAL HTN: ICD-10-CM

## 2019-04-11 DIAGNOSIS — F32.A ANXIETY AND DEPRESSION: ICD-10-CM

## 2019-04-11 RX ORDER — AMITRIPTYLINE HYDROCHLORIDE 150 MG/1
TABLET, FILM COATED ORAL
Qty: 30 TABLET | Refills: 3 | Status: SHIPPED | OUTPATIENT
Start: 2019-04-11 | End: 2019-07-08

## 2019-04-11 RX ORDER — HYDROCHLOROTHIAZIDE 25 MG/1
TABLET ORAL
Qty: 30 TABLET | Refills: 4 | Status: SHIPPED | OUTPATIENT
Start: 2019-04-11 | End: 2019-08-06 | Stop reason: SDUPTHER

## 2019-04-11 RX ORDER — OMEPRAZOLE 20 MG/1
CAPSULE, DELAYED RELEASE ORAL
Qty: 30 CAPSULE | Refills: 4 | Status: SHIPPED | OUTPATIENT
Start: 2019-04-11 | End: 2019-09-08 | Stop reason: SDUPTHER

## 2019-04-11 RX ORDER — PROMETHAZINE HYDROCHLORIDE 25 MG/1
TABLET ORAL
Qty: 42 TABLET | Refills: 1 | Status: SHIPPED | OUTPATIENT
Start: 2019-04-11 | End: 2019-06-21

## 2019-05-13 ENCOUNTER — OFFICE VISIT (OUTPATIENT)
Dept: INTERNAL MEDICINE | Facility: CLINIC | Age: 54
End: 2019-05-13

## 2019-05-13 VITALS
BODY MASS INDEX: 34.95 KG/M2 | OXYGEN SATURATION: 95 % | DIASTOLIC BLOOD PRESSURE: 76 MMHG | HEART RATE: 91 BPM | WEIGHT: 244.13 LBS | HEIGHT: 70 IN | SYSTOLIC BLOOD PRESSURE: 118 MMHG | RESPIRATION RATE: 18 BRPM | TEMPERATURE: 97.5 F

## 2019-05-13 DIAGNOSIS — F32.A ANXIETY AND DEPRESSION: Primary | ICD-10-CM

## 2019-05-13 DIAGNOSIS — F41.9 ANXIETY AND DEPRESSION: Primary | ICD-10-CM

## 2019-05-13 PROCEDURE — 99213 OFFICE O/P EST LOW 20 MIN: CPT | Performed by: INTERNAL MEDICINE

## 2019-05-13 NOTE — PROGRESS NOTES
"Subjective   Raj Kuhn is a 53 y.o. male here for follow-up A&D. Mood is poor overall. He is seeing a counselor. He is having financial difficulties as he is filing for disability and cannot work. He had a friend helping him out financially, but that has ended so he is really struggling for money. Doesn't know what he will do. He also has his disability trial coming up in 1 month and very stressed about that. Can't tell from his  if they think he will win or not. He thinks he has all of the documentation he needs but still scared about winning. Mood is up and down, has good days and bad. Feels isolated.  He also just had a skin tag removed on right lateral trunk he wants looked at. Removed by derm.       The following portions of the patient's history were reviewed and updated as appropriate: allergies, current medications, past medical history, past social history, past surgical history and problem list.    Review of Systems:  General: negative  Skin: healing wound  Neuro: negative  Psych: A&D  MSK: chronic joint pain, multiple    Objective   /76 (BP Location: Left arm, Patient Position: Sitting, Cuff Size: Adult)   Pulse 91   Temp 97.5 °F (36.4 °C) (Temporal)   Resp 18   Ht 177.8 cm (70\")   Wt 111 kg (244 lb 2 oz)   SpO2 95%   BMI 35.03 kg/m²     Physical Exam   Constitutional: He is oriented to person, place, and time. He appears well-developed and well-nourished.   Pulmonary/Chest: Effort normal.   Neurological: He is alert and oriented to person, place, and time.   Skin: Skin is warm and dry.   Right side with 0.5cm healing wound, no signs of infection   Psychiatric: He has a normal mood and affect. His behavior is normal. Judgment and thought content normal.   Vitals reviewed.      Assessment/Plan   Raj was seen today for wound check.    Diagnoses and all orders for this visit:    Anxiety and depression  -up and down recently. Having financial stress, isolation, and upcoming " disability trial  -continue counseling, continue current meds. Hopeful with disability trial over soon mood will improve as financial situation also improves

## 2019-05-18 ENCOUNTER — APPOINTMENT (OUTPATIENT)
Dept: GENERAL RADIOLOGY | Facility: HOSPITAL | Age: 54
End: 2019-05-18

## 2019-05-18 ENCOUNTER — HOSPITAL ENCOUNTER (EMERGENCY)
Facility: HOSPITAL | Age: 54
Discharge: HOME | End: 2019-05-18
Attending: EMERGENCY MEDICINE | Admitting: EMERGENCY MEDICINE

## 2019-05-18 VITALS
OXYGEN SATURATION: 96 % | WEIGHT: 244.6 LBS | HEART RATE: 89 BPM | RESPIRATION RATE: 20 BRPM | HEIGHT: 70 IN | SYSTOLIC BLOOD PRESSURE: 133 MMHG | BODY MASS INDEX: 35.02 KG/M2 | TEMPERATURE: 98.3 F | DIASTOLIC BLOOD PRESSURE: 82 MMHG

## 2019-05-18 DIAGNOSIS — M25.462 EFFUSION OF LEFT KNEE: ICD-10-CM

## 2019-05-18 DIAGNOSIS — M25.562 ACUTE PAIN OF LEFT KNEE: Primary | ICD-10-CM

## 2019-05-18 PROCEDURE — 73562 X-RAY EXAM OF KNEE 3: CPT

## 2019-05-18 PROCEDURE — 99283 EMERGENCY DEPT VISIT LOW MDM: CPT

## 2019-05-18 PROCEDURE — 25010000002 KETOROLAC TROMETHAMINE PER 15 MG: Performed by: PHYSICIAN ASSISTANT

## 2019-05-18 PROCEDURE — 96372 THER/PROPH/DIAG INJ SC/IM: CPT

## 2019-05-18 RX ORDER — KETOROLAC TROMETHAMINE 30 MG/ML
60 INJECTION, SOLUTION INTRAMUSCULAR; INTRAVENOUS ONCE
Status: COMPLETED | OUTPATIENT
Start: 2019-05-18 | End: 2019-05-18

## 2019-05-18 RX ADMIN — KETOROLAC TROMETHAMINE 60 MG: 30 INJECTION, SOLUTION INTRAMUSCULAR; INTRAVENOUS at 15:31

## 2019-05-24 ENCOUNTER — OFFICE VISIT (OUTPATIENT)
Dept: INTERNAL MEDICINE | Facility: CLINIC | Age: 54
End: 2019-05-24

## 2019-05-24 VITALS
BODY MASS INDEX: 34.07 KG/M2 | TEMPERATURE: 97.2 F | WEIGHT: 238 LBS | DIASTOLIC BLOOD PRESSURE: 78 MMHG | HEIGHT: 70 IN | SYSTOLIC BLOOD PRESSURE: 132 MMHG

## 2019-05-24 DIAGNOSIS — M25.50 CHRONIC JOINT PAIN: Primary | ICD-10-CM

## 2019-05-24 DIAGNOSIS — F41.9 ANXIETY AND DEPRESSION: ICD-10-CM

## 2019-05-24 DIAGNOSIS — F32.A ANXIETY AND DEPRESSION: ICD-10-CM

## 2019-05-24 DIAGNOSIS — G89.29 CHRONIC JOINT PAIN: Primary | ICD-10-CM

## 2019-05-24 PROCEDURE — 99213 OFFICE O/P EST LOW 20 MIN: CPT | Performed by: INTERNAL MEDICINE

## 2019-05-24 NOTE — PROGRESS NOTES
"Subjective   Raj Kuhn is a 53 y.o. male here for follow-up chronic joint pain and A&D. He has some paperwork to fill out regarding his upcoming disability court case on June 13. He continues to have chronic joint pain though going to pain mgmt has greatly benefited him. He still struggles with severe pain on a daily basis. He follows up regularly with his ortho Dr. Marcano. His mood has been stable, doing well on current regimen. No new issues today.      The following portions of the patient's history were reviewed and updated as appropriate: allergies, current medications, past medical history, past social history, past surgical history and problem list.    Review of Systems:  General: negative  MSK: chronic joint pains, multiple  Neuro: negative  Psych: A&D    Objective   /78 (BP Location: Left arm, Patient Position: Sitting, Cuff Size: Large Adult)   Temp 97.2 °F (36.2 °C) (Temporal)   Ht 177.8 cm (70\")   Wt 108 kg (238 lb)   BMI 34.15 kg/m²     Physical Exam   Constitutional: He is oriented to person, place, and time. He appears well-developed and well-nourished.   Pulmonary/Chest: Effort normal.   Neurological: He is alert and oriented to person, place, and time.   Skin: Skin is warm and dry.   Psychiatric: He has a normal mood and affect. His behavior is normal. Judgment and thought content normal.   Vitals reviewed.      Assessment/Plan   Raj was seen today for follow-up.    Diagnoses and all orders for this visit:    Chronic joint pain  -continue pain meds, muscle relaxants  -filled out paperwork for upcoming disability court case    Anxiety and depression  -mood stable, continue current meds         "

## 2019-05-30 ENCOUNTER — OFFICE VISIT (OUTPATIENT)
Dept: INTERNAL MEDICINE | Facility: CLINIC | Age: 54
End: 2019-05-30

## 2019-05-30 VITALS
SYSTOLIC BLOOD PRESSURE: 144 MMHG | TEMPERATURE: 97.3 F | HEIGHT: 70 IN | WEIGHT: 242 LBS | DIASTOLIC BLOOD PRESSURE: 70 MMHG | BODY MASS INDEX: 34.65 KG/M2

## 2019-05-30 DIAGNOSIS — J01.90 ACUTE NON-RECURRENT SINUSITIS, UNSPECIFIED LOCATION: Primary | ICD-10-CM

## 2019-05-30 DIAGNOSIS — R05.9 COUGH: ICD-10-CM

## 2019-05-30 DIAGNOSIS — R51.9 FACIAL PAIN: ICD-10-CM

## 2019-05-30 PROCEDURE — 99213 OFFICE O/P EST LOW 20 MIN: CPT | Performed by: NURSE PRACTITIONER

## 2019-05-30 RX ORDER — AZITHROMYCIN 250 MG/1
TABLET, FILM COATED ORAL
Qty: 6 TABLET | Refills: 0 | Status: SHIPPED | OUTPATIENT
Start: 2019-05-30 | End: 2019-06-21

## 2019-05-30 NOTE — PROGRESS NOTES
Subjective   Raj Kuhn is a 53 y.o. male here today for facial pain, headache, upper airway congestion, ear fullness, sore throat, rhinorrhea, fatigue, body aches, and dry cough. Symptoms began yesterday and worsened over the night. He feels like he's been running fevers and having difficult time getting out of bed. He is fearful of having the flu, but has no known exposure. He denies any shortness of air or chest pain.    Chief Complaint   Patient presents with   • Sinusitis       Review of Systems   Constitutional: Positive for activity change, fatigue and fever. Negative for chills.   HENT: Positive for congestion, ear pain, postnasal drip, rhinorrhea, sinus pressure and sore throat.    Eyes: Positive for pain. Negative for blurred vision.   Respiratory: Positive for cough. Negative for shortness of breath.    Cardiovascular: Negative for chest pain.   Gastrointestinal: Negative for nausea and vomiting.   Musculoskeletal: Positive for myalgias.   Neurological: Positive for headache. Negative for dizziness.   Hematological: Negative for adenopathy.       Past Medical History:   Diagnosis Date   • Arthritis      Past Surgical History:   Procedure Laterality Date   • HIP SURGERY     • KNEE SURGERY     • SHOULDER SURGERY     • TONSILLECTOMY     • VASECTOMY     • WRIST ARTHROPLASTY       Family History   Problem Relation Age of Onset   • Arthritis Mother    • Hypertension Mother    • Hypertension Father    • Thyroid disease Father    • Heart attack Brother      Social History     Socioeconomic History   • Marital status:      Spouse name: Not on file   • Number of children: Not on file   • Years of education: Not on file   • Highest education level: Not on file   Tobacco Use   • Smoking status: Never Smoker   • Smokeless tobacco: Never Used   Substance and Sexual Activity   • Alcohol use: No   • Drug use: No     Allergies   Allergen Reactions   • Prednisone          Current Outpatient Medications:   •   "amitriptyline (ELAVIL) 150 MG tablet, TAKE 1 TABLET BY MOUTH IN THE EVENING , Disp: 30 tablet, Rfl: 3  •  azithromycin (ZITHROMAX) 250 MG tablet, Take 2 tablets the first day, then 1 tablet daily for 4 days., Disp: 6 tablet, Rfl: 0  •  cyclobenzaprine (FLEXERIL) 10 MG tablet, TAKE 1 TABLET BY MOUTH THREE TIMES A DAY AS NEEDED FOR MUSCLE SPASM, Disp: 90 tablet, Rfl: 4  •  diazePAM (VALIUM) 10 MG tablet, Take 0.5-1 tablets by mouth 2 (Two) Times a Day As Needed for Anxiety., Disp: 60 tablet, Rfl: 2  •  gabapentin (NEURONTIN) 600 MG tablet, Take 2 tablets by mouth Every Evening., Disp: 60 tablet, Rfl: 2  •  hydrALAZINE (APRESOLINE) 25 MG tablet, TAKE 1 TABLET BY MOUTH TWO TIMES A DAY , Disp: 60 tablet, Rfl: 3  •  hydrochlorothiazide (HYDRODIURIL) 25 MG tablet, TAKE 1 TABLET BY MOUTH ONE TIME A DAY , Disp: 30 tablet, Rfl: 4  •  HYDROcodone-acetaminophen (NORCO)  MG per tablet, Take 1 tablet by mouth Every 8 (Eight) Hours As Needed., Disp: , Rfl:   •  lidocaine-prilocaine (EMLA) 2.5-2.5 % cream, , Disp: , Rfl:   •  lisinopril (PRINIVIL,ZESTRIL) 40 MG tablet, TAKE 1 TABLET BY MOUTH ONE TIME A DAY , Disp: 30 tablet, Rfl: 3  •  omeprazole (priLOSEC) 20 MG capsule, TAKE 1 CAPSULE BY MOUTH ONE TIME A DAY , Disp: 30 capsule, Rfl: 4  •  promethazine (PHENERGAN) 25 MG tablet, TAKE 1 TABLET BY MOUTH EVERY EIGHT HOURS AS NEEDED FOR NAUSEA AND VOMITING, Disp: 42 tablet, Rfl: 1  •  valACYclovir (VALTREX) 1000 MG tablet, Take 1 tablet by mouth Daily., Disp: 30 tablet, Rfl: 5    Objective   Vitals:    05/30/19 1442   BP: 144/70   BP Location: Left arm   Patient Position: Sitting   Cuff Size: Large Adult   Temp: 97.3 °F (36.3 °C)   TempSrc: Temporal   Weight: 110 kg (242 lb)   Height: 177.8 cm (70\")     Body mass index is 34.72 kg/m².    Physical Exam   Constitutional: He is oriented to person, place, and time. He appears well-developed and well-nourished. No distress.   HENT:   Head: Normocephalic and atraumatic.   Right Ear: " External ear and ear canal normal. Tympanic membrane is erythematous.   Left Ear: External ear and ear canal normal. Tympanic membrane is erythematous.   Nose: Mucosal edema, rhinorrhea, sinus tenderness and congestion present. Right sinus exhibits maxillary sinus tenderness and frontal sinus tenderness. Left sinus exhibits maxillary sinus tenderness and frontal sinus tenderness.   Mouth/Throat: Uvula is midline and mucous membranes are normal. Posterior oropharyngeal erythema present. No oropharyngeal exudate. Tonsils are 2+ on the right. Tonsils are 2+ on the left.   Eyes: Conjunctivae are normal. Pupils are equal, round, and reactive to light. Right conjunctiva is not injected. Left conjunctiva is not injected.   Cardiovascular: Normal rate, regular rhythm and normal heart sounds.   Pulmonary/Chest: Effort normal and breath sounds normal. No respiratory distress.   Lymphadenopathy:        Head (right side): No submental, no submandibular and no tonsillar adenopathy present.        Head (left side): No submental, no submandibular and no tonsillar adenopathy present.     He has no cervical adenopathy.   Neurological: He is alert and oriented to person, place, and time.   Skin: Skin is warm and dry.   Nursing note and vitals reviewed.      Assessment/Plan   Problem List Items Addressed This Visit     None      Visit Diagnoses     Acute non-recurrent sinusitis, unspecified location    -  Primary    Relevant Medications    azithromycin (ZITHROMAX) 250 MG tablet    Facial pain        Cough                 Advised patient to not use any over the counter cold medications with DM due to blood pressure.     Plan of care reviewed with the patient at the conclusion of today's visit.  Education was provided regarding diagnosis, management, and any prescribed or recommended OTC medications.  Patient verbalized understanding of and agreement with management plan.     Return if symptoms worsen or fail to improve.      Adrienne  Ria Chris, APRN

## 2019-05-31 NOTE — PROGRESS NOTES
I have reviewed the notes, assessments, and/or procedures performed by JOSE Black and I concur with her documentation of Raj Kuhn.

## 2019-06-07 DIAGNOSIS — M25.50 CHRONIC JOINT PAIN: ICD-10-CM

## 2019-06-07 DIAGNOSIS — G89.29 CHRONIC JOINT PAIN: ICD-10-CM

## 2019-06-07 RX ORDER — CYCLOBENZAPRINE HCL 10 MG
TABLET ORAL
Qty: 90 TABLET | Refills: 3 | Status: SHIPPED | OUTPATIENT
Start: 2019-06-07 | End: 2019-10-17 | Stop reason: SDUPTHER

## 2019-06-10 DIAGNOSIS — F41.8 ANXIETY ABOUT HEALTH: ICD-10-CM

## 2019-06-10 DIAGNOSIS — F41.9 ANXIETY AND DEPRESSION: ICD-10-CM

## 2019-06-10 DIAGNOSIS — F32.A ANXIETY AND DEPRESSION: ICD-10-CM

## 2019-06-10 RX ORDER — PROMETHAZINE HYDROCHLORIDE 25 MG/1
TABLET ORAL
Qty: 42 TABLET | Refills: 1 | Status: SHIPPED | OUTPATIENT
Start: 2019-06-10 | End: 2019-09-04

## 2019-06-10 RX ORDER — DIAZEPAM 10 MG/1
TABLET ORAL
Qty: 60 TABLET | Refills: 1 | Status: SHIPPED | OUTPATIENT
Start: 2019-06-10 | End: 2019-07-22 | Stop reason: SDUPTHER

## 2019-06-10 RX ORDER — ESCITALOPRAM OXALATE 20 MG/1
TABLET ORAL
Qty: 30 TABLET | Refills: 0 | Status: SHIPPED | OUTPATIENT
Start: 2019-06-10 | End: 2019-07-10 | Stop reason: SDUPTHER

## 2019-06-21 ENCOUNTER — OFFICE VISIT (OUTPATIENT)
Dept: INTERNAL MEDICINE | Facility: CLINIC | Age: 54
End: 2019-06-21

## 2019-06-21 VITALS
WEIGHT: 236.38 LBS | DIASTOLIC BLOOD PRESSURE: 88 MMHG | BODY MASS INDEX: 33.84 KG/M2 | RESPIRATION RATE: 18 BRPM | SYSTOLIC BLOOD PRESSURE: 136 MMHG | TEMPERATURE: 98.1 F | HEART RATE: 94 BPM | HEIGHT: 70 IN | OXYGEN SATURATION: 98 %

## 2019-06-21 DIAGNOSIS — R79.89 LOW TSH LEVEL: ICD-10-CM

## 2019-06-21 DIAGNOSIS — Z13.29 THYROID DISORDER SCREENING: ICD-10-CM

## 2019-06-21 DIAGNOSIS — I10 ESSENTIAL HYPERTENSION: Primary | ICD-10-CM

## 2019-06-21 DIAGNOSIS — R00.0 INCREASED HEART RATE: ICD-10-CM

## 2019-06-21 DIAGNOSIS — F41.9 ANXIETY: ICD-10-CM

## 2019-06-21 LAB
ALBUMIN SERPL-MCNC: 4.6 G/DL (ref 3.5–5.2)
ALBUMIN/GLOB SERPL: 1.5 G/DL
ALP SERPL-CCNC: 69 U/L (ref 39–117)
ALT SERPL W P-5'-P-CCNC: 26 U/L (ref 1–41)
ANION GAP SERPL CALCULATED.3IONS-SCNC: 14.8 MMOL/L
AST SERPL-CCNC: 26 U/L (ref 1–40)
BASOPHILS # BLD AUTO: 0.01 10*3/MM3 (ref 0–0.2)
BASOPHILS NFR BLD AUTO: 0.1 % (ref 0–1.5)
BILIRUB SERPL-MCNC: 0.3 MG/DL (ref 0.2–1.2)
BUN BLD-MCNC: 29 MG/DL (ref 6–20)
BUN/CREAT SERPL: 26.1 (ref 7–25)
CALCIUM SPEC-SCNC: 10.3 MG/DL (ref 8.6–10.5)
CHLORIDE SERPL-SCNC: 93 MMOL/L (ref 98–107)
CO2 SERPL-SCNC: 29.2 MMOL/L (ref 22–29)
CREAT BLD-MCNC: 1.11 MG/DL (ref 0.76–1.27)
DEPRECATED RDW RBC AUTO: 44 FL (ref 37–54)
EOSINOPHIL # BLD AUTO: 0.01 10*3/MM3 (ref 0–0.4)
EOSINOPHIL NFR BLD AUTO: 0.1 % (ref 0.3–6.2)
ERYTHROCYTE [DISTWIDTH] IN BLOOD BY AUTOMATED COUNT: 12.6 % (ref 12.3–15.4)
GFR SERPL CREATININE-BSD FRML MDRD: 69 ML/MIN/1.73
GLOBULIN UR ELPH-MCNC: 3.1 GM/DL
GLUCOSE BLD-MCNC: 80 MG/DL (ref 65–99)
HCT VFR BLD AUTO: 49 % (ref 37.5–51)
HGB BLD-MCNC: 16.4 G/DL (ref 13–17.7)
IMM GRANULOCYTES # BLD AUTO: 0.13 10*3/MM3 (ref 0–0.05)
IMM GRANULOCYTES NFR BLD AUTO: 0.9 % (ref 0–0.5)
LYMPHOCYTES # BLD AUTO: 2.59 10*3/MM3 (ref 0.7–3.1)
LYMPHOCYTES NFR BLD AUTO: 17.8 % (ref 19.6–45.3)
MCH RBC QN AUTO: 32.4 PG (ref 26.6–33)
MCHC RBC AUTO-ENTMCNC: 33.5 G/DL (ref 31.5–35.7)
MCV RBC AUTO: 96.8 FL (ref 79–97)
MONOCYTES # BLD AUTO: 1.39 10*3/MM3 (ref 0.1–0.9)
MONOCYTES NFR BLD AUTO: 9.6 % (ref 5–12)
NEUTROPHILS # BLD AUTO: 10.4 10*3/MM3 (ref 1.7–7)
NEUTROPHILS NFR BLD AUTO: 71.5 % (ref 42.7–76)
NRBC BLD AUTO-RTO: 0 /100 WBC (ref 0–0.2)
PLATELET # BLD AUTO: 260 10*3/MM3 (ref 140–450)
PMV BLD AUTO: 10.2 FL (ref 6–12)
POTASSIUM BLD-SCNC: 4.5 MMOL/L (ref 3.5–5.2)
PROT SERPL-MCNC: 7.7 G/DL (ref 6–8.5)
RBC # BLD AUTO: 5.06 10*6/MM3 (ref 4.14–5.8)
SODIUM BLD-SCNC: 137 MMOL/L (ref 136–145)
T3FREE SERPL-MCNC: 2.48 PG/ML (ref 2–4.4)
TSH SERPL DL<=0.05 MIU/L-ACNC: 1.65 MIU/ML (ref 0.27–4.2)
WBC NRBC COR # BLD: 14.53 10*3/MM3 (ref 3.4–10.8)

## 2019-06-21 PROCEDURE — 84481 FREE ASSAY (FT-3): CPT | Performed by: NURSE PRACTITIONER

## 2019-06-21 PROCEDURE — 85025 COMPLETE CBC W/AUTO DIFF WBC: CPT | Performed by: NURSE PRACTITIONER

## 2019-06-21 PROCEDURE — 84443 ASSAY THYROID STIM HORMONE: CPT | Performed by: NURSE PRACTITIONER

## 2019-06-21 PROCEDURE — 99214 OFFICE O/P EST MOD 30 MIN: CPT | Performed by: NURSE PRACTITIONER

## 2019-06-21 PROCEDURE — 80053 COMPREHEN METABOLIC PANEL: CPT | Performed by: NURSE PRACTITIONER

## 2019-06-21 RX ORDER — LOSARTAN POTASSIUM 100 MG/1
100 TABLET ORAL DAILY
Qty: 30 TABLET | Refills: 1 | Status: SHIPPED | OUTPATIENT
Start: 2019-06-21 | End: 2019-08-06 | Stop reason: SDUPTHER

## 2019-06-21 RX ORDER — NEBIVOLOL 10 MG/1
10 TABLET ORAL DAILY
Qty: 30 TABLET | Refills: 1 | Status: SHIPPED | OUTPATIENT
Start: 2019-06-21 | End: 2019-07-22

## 2019-06-21 RX ORDER — METOPROLOL TARTRATE 50 MG/1
50 TABLET, FILM COATED ORAL 2 TIMES DAILY
Refills: 4 | COMMUNITY
Start: 2019-06-10 | End: 2019-06-21 | Stop reason: SDDI

## 2019-06-21 RX ORDER — KETOCONAZOLE 20 MG/G
CREAM TOPICAL
Refills: 5 | COMMUNITY
Start: 2019-06-13 | End: 2019-08-06

## 2019-06-21 NOTE — PROGRESS NOTES
I have reviewed the notes, assessments, and/or procedures performed by Mrs Calvert, I concur with her/his documentation of Raj Kuhn.

## 2019-06-21 NOTE — PROGRESS NOTES
Subjective   Raj Kuhn is a 53 y.o. male here today for follow up on hypertension and tachycardia. He has stopped taking the Metoprolol due to this causing him fatigue, decreased sexual drive, and ED. He also wants to stop the Lisinopril and start Losartan instead due to reading that Losartan increases libido. Heart rate has been averaging around 120 and blood pressure has been elevated. He is having some headaches, nausea, and increased anxiety. He is not having any leg swelling. His father has hyperthyroidism and patient's TSH level has ran low in the past. He would like this repeated. He denies any shortness of air or chest pain.     Chief Complaint   Patient presents with   • Rapid Heart Rate       Review of Systems   Constitutional: Positive for fatigue. Negative for activity change, appetite change, chills, diaphoresis, fever, unexpected weight gain and unexpected weight loss.   HENT: Negative.  Negative for ear discharge, ear pain, mouth sores, nosebleeds, sinus pressure, sneezing and sore throat.    Eyes: Negative.  Negative for pain, discharge and itching.   Respiratory: Negative.  Negative for cough, chest tightness, shortness of breath and wheezing.    Cardiovascular: Negative.  Negative for chest pain, palpitations and leg swelling.        Tachycardia   Gastrointestinal: Positive for nausea. Negative for abdominal pain, constipation, diarrhea and vomiting.   Endocrine: Negative.  Negative for heat intolerance, polydipsia and polyphagia.   Genitourinary: Positive for decreased libido and erectile dysfunction. Negative for dysuria, flank pain, frequency, hematuria and urgency.   Musculoskeletal: Negative.  Negative for arthralgias, back pain, gait problem, joint swelling, myalgias, neck pain and neck stiffness.   Skin: Negative.  Negative for color change, pallor and rash.   Allergic/Immunologic: Negative.  Negative for immunocompromised state.   Neurological: Positive for headache. Negative for  seizures, speech difficulty, weakness and numbness.   Hematological: Negative.  Negative for adenopathy.   Psychiatric/Behavioral: Negative for agitation, decreased concentration, sleep disturbance and depressed mood. The patient is nervous/anxious.        Past Medical History:   Diagnosis Date   • Arthritis      Past Surgical History:   Procedure Laterality Date   • HIP SURGERY     • KNEE SURGERY     • SHOULDER SURGERY     • TONSILLECTOMY     • VASECTOMY     • WRIST ARTHROPLASTY       Family History   Problem Relation Age of Onset   • Arthritis Mother    • Hypertension Mother    • Hypertension Father    • Thyroid disease Father    • Heart attack Brother      Social History     Socioeconomic History   • Marital status:      Spouse name: Not on file   • Number of children: Not on file   • Years of education: Not on file   • Highest education level: Not on file   Tobacco Use   • Smoking status: Never Smoker   • Smokeless tobacco: Never Used   Substance and Sexual Activity   • Alcohol use: No   • Drug use: No     Current Outpatient Medications on File Prior to Visit   Medication Sig   • amitriptyline (ELAVIL) 150 MG tablet TAKE 1 TABLET BY MOUTH IN THE EVENING    • cyclobenzaprine (FLEXERIL) 10 MG tablet TAKE 1 TABLET BY MOUTH THREE TIMES A DAY AS NEEDED for muscle spasms   • diazePAM (VALIUM) 10 MG tablet Take 0.5-1 tablets by mouth 2 (Two) Times a Day As Needed for Anxiety.   • diazePAM (VALIUM) 10 MG tablet take 1/2 to 1 tablet by mouth twice daily as needed for anxiety   • escitalopram (LEXAPRO) 20 MG tablet TAKE 1 TABLET BY MOUTH ONE TIME A DAY    • gabapentin (NEURONTIN) 600 MG tablet Take 2 tablets by mouth Every Evening.   • hydrALAZINE (APRESOLINE) 25 MG tablet TAKE 1 TABLET BY MOUTH TWO TIMES A DAY    • hydrochlorothiazide (HYDRODIURIL) 25 MG tablet TAKE 1 TABLET BY MOUTH ONE TIME A DAY    • HYDROcodone-acetaminophen (NORCO)  MG per tablet Take 1 tablet by mouth Every 8 (Eight) Hours As Needed.  "  • lidocaine-prilocaine (EMLA) 2.5-2.5 % cream    • omeprazole (priLOSEC) 20 MG capsule TAKE 1 CAPSULE BY MOUTH ONE TIME A DAY    • promethazine (PHENERGAN) 25 MG tablet TAKE 1 TABLET BY MOUTH EVERY EIGHT HOURS AS NEEDED FOR NAUSEA AND VOMITING   • valACYclovir (VALTREX) 1000 MG tablet Take 1 tablet by mouth Daily.   • [DISCONTINUED] lisinopril (PRINIVIL,ZESTRIL) 40 MG tablet TAKE 1 TABLET BY MOUTH ONE TIME A DAY    • [DISCONTINUED] metoprolol tartrate (LOPRESSOR) 50 MG tablet Take 50 mg by mouth 2 (Two) Times a Day.   • ketoconazole (NIZORAL) 2 % cream APPLY a small amount TWO TIMES A DAY    TO red/scaly  AREAs on chest   • [DISCONTINUED] azithromycin (ZITHROMAX) 250 MG tablet Take 2 tablets the first day, then 1 tablet daily for 4 days.   • [DISCONTINUED] promethazine (PHENERGAN) 25 MG tablet TAKE 1 TABLET BY MOUTH EVERY EIGHT HOURS AS NEEDED FOR NAUSEA AND VOMITING     No current facility-administered medications on file prior to visit.      Allergies   Allergen Reactions   • Prednisone            Objective   Vitals:    06/21/19 1151   BP: 136/88   BP Location: Left arm   Patient Position: Sitting   Cuff Size: Adult   Pulse: 94   Resp: 18   Temp: 98.1 °F (36.7 °C)   TempSrc: Temporal   SpO2: 98%   Weight: 107 kg (236 lb 6 oz)   Height: 177.8 cm (70\")   PainSc:   7   PainLoc: Knee     Body mass index is 33.92 kg/m².    Physical Exam   Constitutional: He is oriented to person, place, and time. He appears well-developed and well-nourished.   HENT:   Head: Normocephalic and atraumatic.   Eyes: EOM are normal. Pupils are equal, round, and reactive to light.   Neck: Normal range of motion.   Cardiovascular: Regular rhythm and normal heart sounds. Tachycardia present.   Pulses:       Carotid pulses are 2+ on the right side, and 2+ on the left side.  Heart rate averaging around 105.   Pulmonary/Chest: Effort normal and breath sounds normal.   Abdominal: Soft. Bowel sounds are normal.   Musculoskeletal: Normal range of " motion.   Neurological: He is alert and oriented to person, place, and time.   Skin: Skin is warm and dry.   Psychiatric: His speech is normal and behavior is normal. Thought content normal. His mood appears anxious.   Vitals reviewed.      Assessment/Plan   Problem List Items Addressed This Visit     None      Visit Diagnoses     Essential hypertension    -  Primary    Relevant Medications    losartan (COZAAR) 100 MG tablet    nebivolol (BYSTOLIC) 10 MG tablet    Other Relevant Orders    CBC Auto Differential    Comprehensive Metabolic Panel    Increased heart rate        Relevant Medications    nebivolol (BYSTOLIC) 10 MG tablet    Other Relevant Orders    CBC Auto Differential    Comprehensive Metabolic Panel    TSH Rfx On Abnormal To Free T4    Anxiety        Relevant Orders    CBC Auto Differential    Comprehensive Metabolic Panel    TSH Rfx On Abnormal To Free T4    Low TSH level        Relevant Orders    TSH Rfx On Abnormal To Free T4    T3, free    Thyroid disorder screening        Relevant Orders    TSH Rfx On Abnormal To Free T4            Current Outpatient Medications:   •  amitriptyline (ELAVIL) 150 MG tablet, TAKE 1 TABLET BY MOUTH IN THE EVENING , Disp: 30 tablet, Rfl: 3  •  cyclobenzaprine (FLEXERIL) 10 MG tablet, TAKE 1 TABLET BY MOUTH THREE TIMES A DAY AS NEEDED for muscle spasms, Disp: 90 tablet, Rfl: 3  •  diazePAM (VALIUM) 10 MG tablet, Take 0.5-1 tablets by mouth 2 (Two) Times a Day As Needed for Anxiety., Disp: 60 tablet, Rfl: 2  •  diazePAM (VALIUM) 10 MG tablet, take 1/2 to 1 tablet by mouth twice daily as needed for anxiety, Disp: 60 tablet, Rfl: 1  •  escitalopram (LEXAPRO) 20 MG tablet, TAKE 1 TABLET BY MOUTH ONE TIME A DAY , Disp: 30 tablet, Rfl: 0  •  gabapentin (NEURONTIN) 600 MG tablet, Take 2 tablets by mouth Every Evening., Disp: 60 tablet, Rfl: 2  •  hydrALAZINE (APRESOLINE) 25 MG tablet, TAKE 1 TABLET BY MOUTH TWO TIMES A DAY , Disp: 60 tablet, Rfl: 3  •  hydrochlorothiazide  (HYDRODIURIL) 25 MG tablet, TAKE 1 TABLET BY MOUTH ONE TIME A DAY , Disp: 30 tablet, Rfl: 4  •  HYDROcodone-acetaminophen (NORCO)  MG per tablet, Take 1 tablet by mouth Every 8 (Eight) Hours As Needed., Disp: , Rfl:   •  lidocaine-prilocaine (EMLA) 2.5-2.5 % cream, , Disp: , Rfl:   •  omeprazole (priLOSEC) 20 MG capsule, TAKE 1 CAPSULE BY MOUTH ONE TIME A DAY , Disp: 30 capsule, Rfl: 4  •  promethazine (PHENERGAN) 25 MG tablet, TAKE 1 TABLET BY MOUTH EVERY EIGHT HOURS AS NEEDED FOR NAUSEA AND VOMITING, Disp: 42 tablet, Rfl: 1  •  valACYclovir (VALTREX) 1000 MG tablet, Take 1 tablet by mouth Daily., Disp: 30 tablet, Rfl: 5  •  ketoconazole (NIZORAL) 2 % cream, APPLY a small amount TWO TIMES A DAY    TO red/scaly  AREAs on chest, Disp: , Rfl: 5  •  losartan (COZAAR) 100 MG tablet, Take 1 tablet by mouth Daily., Disp: 30 tablet, Rfl: 1  •  nebivolol (BYSTOLIC) 10 MG tablet, Take 1 tablet by mouth Daily., Disp: 30 tablet, Rfl: 1         Plan of care reviewed with the patient at the conclusion of today's visit.  Education was provided regarding diagnosis, management, and any prescribed or recommended OTC medications.  Patient verbalized understanding of and agreement with management plan.     Return in about 2 weeks (around 7/5/2019), or if symptoms worsen or fail to improve.      Adrienne Chris, APRN

## 2019-06-25 NOTE — PROGRESS NOTES
Please contact patient and advise that his white blood cells are just a bit elevated. Make sure his sinus infection has resolved. He needs to drink more water. All other labs are normal.

## 2019-07-08 ENCOUNTER — OFFICE VISIT (OUTPATIENT)
Dept: INTERNAL MEDICINE | Facility: CLINIC | Age: 54
End: 2019-07-08

## 2019-07-08 ENCOUNTER — TELEPHONE (OUTPATIENT)
Dept: INTERNAL MEDICINE | Facility: CLINIC | Age: 54
End: 2019-07-08

## 2019-07-08 VITALS
DIASTOLIC BLOOD PRESSURE: 76 MMHG | HEIGHT: 70 IN | HEART RATE: 51 BPM | RESPIRATION RATE: 16 BRPM | BODY MASS INDEX: 33.36 KG/M2 | SYSTOLIC BLOOD PRESSURE: 128 MMHG | WEIGHT: 233 LBS | OXYGEN SATURATION: 100 %

## 2019-07-08 DIAGNOSIS — F51.04 CHRONIC INSOMNIA: ICD-10-CM

## 2019-07-08 DIAGNOSIS — R00.0 TACHYCARDIA: ICD-10-CM

## 2019-07-08 DIAGNOSIS — R68.2 DRY MOUTH: ICD-10-CM

## 2019-07-08 DIAGNOSIS — I10 BENIGN ESSENTIAL HTN: Primary | ICD-10-CM

## 2019-07-08 PROCEDURE — 99214 OFFICE O/P EST MOD 30 MIN: CPT | Performed by: INTERNAL MEDICINE

## 2019-07-08 RX ORDER — AMITRIPTYLINE HYDROCHLORIDE 100 MG/1
TABLET, FILM COATED ORAL
Qty: 30 TABLET | Refills: 0 | Status: SHIPPED | OUTPATIENT
Start: 2019-07-08 | End: 2019-08-06

## 2019-07-08 NOTE — TELEPHONE ENCOUNTER
PATIENT CALLED AND SAID HE IS NEEDING A REFILL FOR ALL MEDICATION SENT INTO [facility]

## 2019-07-08 NOTE — PROGRESS NOTES
"Subjective   Raj Kuhn is a 53 y.o. male here for follow-up HTN and tachycardia. Last visit was changed from metoprolol and lisinopril to bystolic and losartan. His BP has been excellently controlled and his pulse is down from 120 to 50s. He denies any hypotensive sx, dizziness. Hasn't noticed improvement in ED yet. Just approved for disability. Also wants to come off elavil as it has caused dry mouth and he has lost several teeth due to this.    The following portions of the patient's history were reviewed and updated as appropriate: allergies, current medications, past medical history, past social history and problem list.    Review of Systems:  General: negative  HEENT: dry mouth, tooth loss  CV: negative  Respiratory: negative  : ED  Neuro: negative  Psych: improved mood    Objective   /76   Pulse 51   Resp 16   Ht 177.8 cm (70\")   Wt 106 kg (233 lb)   SpO2 100%   BMI 33.43 kg/m²     Physical Exam   Constitutional: He is oriented to person, place, and time. He appears well-developed and well-nourished.   HENT:   Poor dentition   Pulmonary/Chest: Effort normal.   Neurological: He is alert and oriented to person, place, and time.   Skin: Skin is warm and dry.   Psychiatric: He has a normal mood and affect. His behavior is normal. Judgment and thought content normal.   Vitals reviewed.      Assessment/Plan   Raj was seen today for anxiety and depression.    Diagnoses and all orders for this visit:    Benign essential HTN  -excellent control, continue losartan    Chronic insomnia  -     amitriptyline (ELAVIL) 100 MG tablet; Take 1 tablet for 2 weeks then decrease to 1/2 tablet daily    Tachycardia  -pulse now in 50s  -continue bystolic    Dry mouth  -likely from amitriptyline, will go down slowly on dose. Rx for 100mg for 2 weeks then decrease to 50mg. If withdrawal sx, back up on the dose and will do slower taper     "

## 2019-07-10 ENCOUNTER — TELEPHONE (OUTPATIENT)
Dept: INTERNAL MEDICINE | Facility: CLINIC | Age: 54
End: 2019-07-10

## 2019-07-10 DIAGNOSIS — F41.9 ANXIETY AND DEPRESSION: ICD-10-CM

## 2019-07-10 DIAGNOSIS — I10 BENIGN ESSENTIAL HTN: ICD-10-CM

## 2019-07-10 DIAGNOSIS — F32.A ANXIETY AND DEPRESSION: ICD-10-CM

## 2019-07-10 RX ORDER — HYDRALAZINE HYDROCHLORIDE 25 MG/1
TABLET, FILM COATED ORAL
Qty: 60 TABLET | Refills: 2 | Status: SHIPPED | OUTPATIENT
Start: 2019-07-10 | End: 2019-07-22

## 2019-07-10 RX ORDER — ESCITALOPRAM OXALATE 20 MG/1
TABLET ORAL
Qty: 30 TABLET | Refills: 0 | Status: SHIPPED | OUTPATIENT
Start: 2019-07-10 | End: 2019-08-09 | Stop reason: SDUPTHER

## 2019-07-10 RX ORDER — METOPROLOL TARTRATE 50 MG/1
TABLET, FILM COATED ORAL
Qty: 60 TABLET | Refills: 3 | Status: SHIPPED | OUTPATIENT
Start: 2019-07-10 | End: 2019-07-22 | Stop reason: SINTOL

## 2019-07-10 RX ORDER — PROMETHAZINE HYDROCHLORIDE 25 MG/1
TABLET ORAL
Qty: 42 TABLET | Refills: 1 | Status: SHIPPED | OUTPATIENT
Start: 2019-07-10 | End: 2019-07-22 | Stop reason: SDUPTHER

## 2019-07-10 RX ORDER — AMITRIPTYLINE HYDROCHLORIDE 150 MG/1
TABLET, FILM COATED ORAL
Qty: 30 TABLET | Refills: 3 | Status: SHIPPED | OUTPATIENT
Start: 2019-07-10 | End: 2019-07-22 | Stop reason: DRUGHIGH

## 2019-07-10 RX ORDER — LISINOPRIL 40 MG/1
TABLET ORAL
Qty: 30 TABLET | Refills: 2 | Status: SHIPPED | OUTPATIENT
Start: 2019-07-10 | End: 2019-07-22 | Stop reason: SINTOL

## 2019-07-10 NOTE — TELEPHONE ENCOUNTER
PATIENT CALLED WANTING TO CHECK ON THE STATUS OF HIS BYSTOLIC. PATIENT ALSO WANTED TO KNOW IF YOU WOULD HAVE ANY SAMPLES OF BYSTOLIC UNTIL HIS GET APPROVED BY HIS INSURANCE. PLEASE GIVE PT A CALL AND LET HIM KNOW THAT YOU HAVE SOME SAMPLES.

## 2019-07-12 ENCOUNTER — PRIOR AUTHORIZATION (OUTPATIENT)
Dept: INTERNAL MEDICINE | Facility: CLINIC | Age: 54
End: 2019-07-12

## 2019-07-12 NOTE — TELEPHONE ENCOUNTER
Submitted PA for pt's bystolic on CMM. Diagnosis: hypertension and increased heart rate. Pt has tried and failed lisinopril, losartan, and metoprolol. Awaiting response from insurance.     KEY:[id number]

## 2019-07-22 ENCOUNTER — OFFICE VISIT (OUTPATIENT)
Dept: INTERNAL MEDICINE | Facility: CLINIC | Age: 54
End: 2019-07-22

## 2019-07-22 VITALS
OXYGEN SATURATION: 98 % | TEMPERATURE: 97.3 F | SYSTOLIC BLOOD PRESSURE: 130 MMHG | HEIGHT: 70 IN | HEART RATE: 71 BPM | BODY MASS INDEX: 33.66 KG/M2 | DIASTOLIC BLOOD PRESSURE: 76 MMHG | WEIGHT: 235.13 LBS | RESPIRATION RATE: 20 BRPM

## 2019-07-22 DIAGNOSIS — R51.9 NONINTRACTABLE HEADACHE, UNSPECIFIED CHRONICITY PATTERN, UNSPECIFIED HEADACHE TYPE: ICD-10-CM

## 2019-07-22 DIAGNOSIS — R00.0 TACHYCARDIA: ICD-10-CM

## 2019-07-22 DIAGNOSIS — I10 ESSENTIAL HYPERTENSION: Primary | ICD-10-CM

## 2019-07-22 PROCEDURE — 99214 OFFICE O/P EST MOD 30 MIN: CPT | Performed by: NURSE PRACTITIONER

## 2019-07-22 RX ORDER — CARVEDILOL 6.25 MG/1
6.25 TABLET ORAL 2 TIMES DAILY WITH MEALS
Qty: 60 TABLET | Refills: 0 | Status: SHIPPED | OUTPATIENT
Start: 2019-07-22 | End: 2019-08-06

## 2019-07-22 NOTE — PROGRESS NOTES
Subjective   Chief Complaint   Patient presents with   • Hypertension     wants to speak about medication      Raj Kuhn is a 53 y.o. male here today for follow up on blood pressure. He has been doing well taking Cozaar 100mg and Bystolic 10mg daily. Since starting Bystolic and his blood pressure and heart rate has been stable. He was having some bradycardia with Lopressor. His ED and decreased sex drive improved with stopping the Lopressor and starting Bystolic. Insurance will not cover Bystolic.  He has been without for a couple of days. His heart rate has been increased with frequent headaches. He denies any shortness of air or chest pain.     The following portions of the patient's history were reviewed and updated as appropriate: allergies, current medications, past family history, past medical history, past social history, past surgical history and problem list.    Review of Systems   Constitutional: Positive for fatigue. Negative for activity change, appetite change, chills, diaphoresis, fever, unexpected weight gain and unexpected weight loss.   HENT: Negative.  Negative for ear discharge, ear pain, mouth sores, nosebleeds, sinus pressure, sneezing and sore throat.    Eyes: Negative.  Negative for pain, discharge and itching.   Respiratory: Negative.  Negative for cough, chest tightness, shortness of breath and wheezing.    Cardiovascular: Negative.  Negative for chest pain, palpitations and leg swelling.        Tachycardia   Gastrointestinal: Positive for nausea. Negative for abdominal pain, constipation, diarrhea and vomiting.   Endocrine: Negative.  Negative for heat intolerance, polydipsia and polyphagia.   Genitourinary: Positive for erectile dysfunction. Negative for dysuria, flank pain, frequency, hematuria and urgency.   Musculoskeletal: Negative.  Negative for arthralgias, back pain, gait problem, joint swelling, myalgias, neck pain and neck stiffness.   Skin: Negative.  Negative for color  change, pallor and rash.   Allergic/Immunologic: Negative.  Negative for immunocompromised state.   Neurological: Positive for headache. Negative for seizures, speech difficulty, weakness and numbness.   Hematological: Negative.  Negative for adenopathy.   Psychiatric/Behavioral: Negative for agitation, decreased concentration, sleep disturbance and depressed mood. The patient is nervous/anxious.        Current Outpatient Medications on File Prior to Visit   Medication Sig   • amitriptyline (ELAVIL) 100 MG tablet Take 1 tablet for 2 weeks then decrease to 1/2 tablet daily   • cyclobenzaprine (FLEXERIL) 10 MG tablet TAKE 1 TABLET BY MOUTH THREE TIMES A DAY AS NEEDED for muscle spasms   • diazePAM (VALIUM) 10 MG tablet Take 0.5-1 tablets by mouth 2 (Two) Times a Day As Needed for Anxiety.   • escitalopram (LEXAPRO) 20 MG tablet TAKE 1 TABLET BY MOUTH ONE TIME A DAY    • gabapentin (NEURONTIN) 600 MG tablet Take 2 tablets by mouth Every Evening.   • hydrochlorothiazide (HYDRODIURIL) 25 MG tablet TAKE 1 TABLET BY MOUTH ONE TIME A DAY    • HYDROcodone-acetaminophen (NORCO)  MG per tablet Take 1 tablet by mouth Every 8 (Eight) Hours As Needed.   • ketoconazole (NIZORAL) 2 % cream APPLY a small amount TWO TIMES A DAY    TO red/scaly  AREAs on chest   • lidocaine-prilocaine (EMLA) 2.5-2.5 % cream    • losartan (COZAAR) 100 MG tablet Take 1 tablet by mouth Daily.   • omeprazole (priLOSEC) 20 MG capsule TAKE 1 CAPSULE BY MOUTH ONE TIME A DAY    • promethazine (PHENERGAN) 25 MG tablet TAKE 1 TABLET BY MOUTH EVERY EIGHT HOURS AS NEEDED FOR NAUSEA AND VOMITING   • valACYclovir (VALTREX) 1000 MG tablet Take 1 tablet by mouth Daily.     No current facility-administered medications on file prior to visit.        Objective   Vitals:    07/22/19 1135   BP: 130/76   BP Location: Left arm   Patient Position: Sitting   Cuff Size: Adult   Pulse: 71   Resp: 20   Temp: 97.3 °F (36.3 °C)   TempSrc: Temporal   SpO2: 98%   Weight: 107  "kg (235 lb 2 oz)   Height: 177.8 cm (70\")   PainSc:   5   PainLoc: Shoulder     Body mass index is 33.74 kg/m².    Physical Exam   Constitutional: He is oriented to person, place, and time. He appears well-developed and well-nourished.   HENT:   Head: Normocephalic and atraumatic.   Eyes: EOM are normal. Pupils are equal, round, and reactive to light.   Neck: Normal range of motion.   Cardiovascular: Normal rate, regular rhythm and normal heart sounds.   Pulses:       Carotid pulses are 2+ on the right side, and 2+ on the left side.  Pulmonary/Chest: Effort normal and breath sounds normal.   Abdominal: Soft. Bowel sounds are normal.   Musculoskeletal: Normal range of motion.   Neurological: He is alert and oriented to person, place, and time.   Skin: Skin is warm and dry.   Psychiatric: His speech is normal and behavior is normal. Thought content normal. His mood appears anxious.   Vitals reviewed.      Assessment/Plan   Raj was seen today for hypertension.    Diagnoses and all orders for this visit:    Essential hypertension  -     carvedilol (COREG) 6.25 MG tablet; Take 1 tablet by mouth 2 (Two) Times a Day With Meals.    Tachycardia  -     carvedilol (COREG) 6.25 MG tablet; Take 1 tablet by mouth 2 (Two) Times a Day With Meals.   Stop medication if heart rate drops below 60 and contact office.     Nonintractable headache, unspecified chronicity pattern, unspecified headache type  -     carvedilol (COREG) 6.25 MG tablet; Take 1 tablet by mouth 2 (Two) Times a Day With Meals.           Current Outpatient Medications:   •  amitriptyline (ELAVIL) 100 MG tablet, Take 1 tablet for 2 weeks then decrease to 1/2 tablet daily, Disp: 30 tablet, Rfl: 0  •  cyclobenzaprine (FLEXERIL) 10 MG tablet, TAKE 1 TABLET BY MOUTH THREE TIMES A DAY AS NEEDED for muscle spasms, Disp: 90 tablet, Rfl: 3  •  diazePAM (VALIUM) 10 MG tablet, Take 0.5-1 tablets by mouth 2 (Two) Times a Day As Needed for Anxiety., Disp: 60 tablet, Rfl: 2  •  " escitalopram (LEXAPRO) 20 MG tablet, TAKE 1 TABLET BY MOUTH ONE TIME A DAY , Disp: 30 tablet, Rfl: 0  •  gabapentin (NEURONTIN) 600 MG tablet, Take 2 tablets by mouth Every Evening., Disp: 60 tablet, Rfl: 2  •  hydrochlorothiazide (HYDRODIURIL) 25 MG tablet, TAKE 1 TABLET BY MOUTH ONE TIME A DAY , Disp: 30 tablet, Rfl: 4  •  HYDROcodone-acetaminophen (NORCO)  MG per tablet, Take 1 tablet by mouth Every 8 (Eight) Hours As Needed., Disp: , Rfl:   •  ketoconazole (NIZORAL) 2 % cream, APPLY a small amount TWO TIMES A DAY    TO red/scaly  AREAs on chest, Disp: , Rfl: 5  •  lidocaine-prilocaine (EMLA) 2.5-2.5 % cream, , Disp: , Rfl:   •  losartan (COZAAR) 100 MG tablet, Take 1 tablet by mouth Daily., Disp: 30 tablet, Rfl: 1  •  omeprazole (priLOSEC) 20 MG capsule, TAKE 1 CAPSULE BY MOUTH ONE TIME A DAY , Disp: 30 capsule, Rfl: 4  •  promethazine (PHENERGAN) 25 MG tablet, TAKE 1 TABLET BY MOUTH EVERY EIGHT HOURS AS NEEDED FOR NAUSEA AND VOMITING, Disp: 42 tablet, Rfl: 1  •  valACYclovir (VALTREX) 1000 MG tablet, Take 1 tablet by mouth Daily., Disp: 30 tablet, Rfl: 5  •  carvedilol (COREG) 6.25 MG tablet, Take 1 tablet by mouth 2 (Two) Times a Day With Meals., Disp: 60 tablet, Rfl: 0       Plan of care reviewed with the patient at the conclusion of today's visit.  Education was provided regarding diagnosis, management, and any prescribed or recommended OTC medications.  Patient verbalized understanding of and agreement with management plan.     Return if symptoms worsen or fail to improve.      Adrienne Chris, APRN

## 2019-08-06 ENCOUNTER — OFFICE VISIT (OUTPATIENT)
Dept: INTERNAL MEDICINE | Facility: CLINIC | Age: 54
End: 2019-08-06

## 2019-08-06 VITALS
WEIGHT: 234 LBS | DIASTOLIC BLOOD PRESSURE: 78 MMHG | TEMPERATURE: 97.9 F | HEIGHT: 70 IN | SYSTOLIC BLOOD PRESSURE: 128 MMHG | BODY MASS INDEX: 33.5 KG/M2

## 2019-08-06 DIAGNOSIS — F41.9 ANXIETY AND DEPRESSION: ICD-10-CM

## 2019-08-06 DIAGNOSIS — F51.04 CHRONIC INSOMNIA: ICD-10-CM

## 2019-08-06 DIAGNOSIS — I10 BENIGN ESSENTIAL HTN: Primary | ICD-10-CM

## 2019-08-06 DIAGNOSIS — E66.01 MORBIDLY OBESE (HCC): ICD-10-CM

## 2019-08-06 DIAGNOSIS — F32.A ANXIETY AND DEPRESSION: ICD-10-CM

## 2019-08-06 PROCEDURE — 99214 OFFICE O/P EST MOD 30 MIN: CPT | Performed by: INTERNAL MEDICINE

## 2019-08-06 RX ORDER — NEBIVOLOL 10 MG/1
10 TABLET ORAL DAILY
COMMUNITY
End: 2019-08-06 | Stop reason: SDUPTHER

## 2019-08-06 RX ORDER — TRAZODONE HYDROCHLORIDE 50 MG/1
50 TABLET ORAL NIGHTLY
Qty: 30 TABLET | Refills: 2 | Status: SHIPPED | OUTPATIENT
Start: 2019-08-06 | End: 2019-08-28 | Stop reason: SDUPTHER

## 2019-08-06 RX ORDER — NEBIVOLOL 10 MG/1
10 TABLET ORAL DAILY
Qty: 90 TABLET | Refills: 2 | Status: SHIPPED | OUTPATIENT
Start: 2019-08-06 | End: 2020-01-07 | Stop reason: SDUPTHER

## 2019-08-06 RX ORDER — HYDROCHLOROTHIAZIDE 25 MG/1
25 TABLET ORAL DAILY
Qty: 90 TABLET | Refills: 2 | Status: SHIPPED | OUTPATIENT
Start: 2019-08-06 | End: 2019-10-09

## 2019-08-06 RX ORDER — OXYCODONE AND ACETAMINOPHEN 7.5; 325 MG/1; MG/1
1 TABLET ORAL EVERY 6 HOURS
Refills: 0 | COMMUNITY
Start: 2019-07-21 | End: 2021-03-19

## 2019-08-06 RX ORDER — AMITRIPTYLINE HYDROCHLORIDE 100 MG/1
50 TABLET, FILM COATED ORAL NIGHTLY
Qty: 30 TABLET | Refills: 0
Start: 2019-08-06 | End: 2019-09-04

## 2019-08-06 RX ORDER — LOSARTAN POTASSIUM 100 MG/1
100 TABLET ORAL DAILY
Qty: 90 TABLET | Refills: 2 | Status: SHIPPED | OUTPATIENT
Start: 2019-08-06 | End: 2020-02-20

## 2019-08-06 NOTE — PROGRESS NOTES
"Subjective   Raj Kuhn is a 53 y.o. male here for follow-up HTN, A&D, and insomnia. HTN: doing well on the new regimen. BP has been well-controlled here in office and in other appts he has had. No hypotensive sx. A&D: mood much improved since last visit. He is feeling that things are looking up for him. He is due his first disability check the 14th so excited about finally having some money to live on. Insomnia: tapering off the elavil. Was on 150mg, now at 50mg for several days and has been doing well with this. Wants to restart trazodone which he took in the past and worked well.    The following portions of the patient's history were reviewed and updated as appropriate: allergies, current medications, past medical history, past social history, past surgical history and problem list.    Review of Systems:  General: negative  CV: negative  Respiratory: negative  Neuro: insomnia  Psych: A&D  MSK: chronic knee, shoulder, back pain    Objective   /78 (BP Location: Left arm, Patient Position: Sitting, Cuff Size: Large Adult)   Temp 97.9 °F (36.6 °C) (Temporal)   Ht 177.8 cm (70\")   Wt 106 kg (234 lb)   BMI 33.58 kg/m²     Physical Exam   Constitutional: He is oriented to person, place, and time. He appears well-developed and well-nourished.   Cardiovascular: Normal rate, regular rhythm and normal heart sounds.   Pulmonary/Chest: Effort normal and breath sounds normal. He has no wheezes. He has no rales.   Neurological: He is alert and oriented to person, place, and time.   Skin: Skin is warm and dry.   Psychiatric: He has a normal mood and affect. His behavior is normal. Thought content normal.   Vitals reviewed.      Assessment/Plan   Raj was seen today for follow-up.    Diagnoses and all orders for this visit:    Benign essential HTN  -     losartan (COZAAR) 100 MG tablet; Take 1 tablet by mouth Daily.  -     nebivolol (BYSTOLIC) 10 MG tablet; Take 1 tablet by mouth Daily.  -     hydrochlorothiazide " (HYDRODIURIL) 25 MG tablet; Take 1 tablet by mouth Daily.   -     Excellent control    Anxiety and depression  -mood improved since last visit    Morbidly obese (CMS/[facility])  Body mass index is 33.58 kg/m².  -complicates all aspects of care    Chronic insomnia  -     amitriptyline (ELAVIL) 100 MG tablet; Take 0.5 tablets by mouth Every Night. Taper off this and start trazodone  -     traZODone (DESYREL) 50 MG tablet; Take 1 tablet by mouth Every Night.

## 2019-08-09 DIAGNOSIS — F41.8 ANXIETY ABOUT HEALTH: ICD-10-CM

## 2019-08-09 DIAGNOSIS — F32.A ANXIETY AND DEPRESSION: ICD-10-CM

## 2019-08-09 DIAGNOSIS — F41.9 ANXIETY AND DEPRESSION: ICD-10-CM

## 2019-08-09 DIAGNOSIS — I10 BENIGN ESSENTIAL HTN: ICD-10-CM

## 2019-08-09 RX ORDER — LISINOPRIL 40 MG/1
TABLET ORAL
Qty: 30 TABLET | Refills: 2 | Status: SHIPPED | OUTPATIENT
Start: 2019-08-09 | End: 2019-08-28

## 2019-08-09 RX ORDER — ESCITALOPRAM OXALATE 20 MG/1
TABLET ORAL
Qty: 30 TABLET | Refills: 0 | Status: SHIPPED | OUTPATIENT
Start: 2019-08-09 | End: 2019-09-11 | Stop reason: SDUPTHER

## 2019-08-09 RX ORDER — METOPROLOL TARTRATE 50 MG/1
TABLET, FILM COATED ORAL
Qty: 60 TABLET | Refills: 3 | Status: SHIPPED | OUTPATIENT
Start: 2019-08-09 | End: 2019-08-28

## 2019-08-09 RX ORDER — PROMETHAZINE HYDROCHLORIDE 25 MG/1
TABLET ORAL
Qty: 42 TABLET | Refills: 1 | Status: SHIPPED | OUTPATIENT
Start: 2019-08-09 | End: 2020-06-26

## 2019-08-09 RX ORDER — DIAZEPAM 10 MG/1
TABLET ORAL
Qty: 60 TABLET | Refills: 1 | Status: SHIPPED | OUTPATIENT
Start: 2019-08-09 | End: 2019-09-04 | Stop reason: SDUPTHER

## 2019-08-28 DIAGNOSIS — F51.04 CHRONIC INSOMNIA: ICD-10-CM

## 2019-08-28 RX ORDER — TRAZODONE HYDROCHLORIDE 50 MG/1
50 TABLET ORAL NIGHTLY
Qty: 30 TABLET | Refills: 2 | Status: SHIPPED | OUTPATIENT
Start: 2019-08-28 | End: 2019-09-04

## 2019-09-04 ENCOUNTER — OFFICE VISIT (OUTPATIENT)
Dept: INTERNAL MEDICINE | Facility: CLINIC | Age: 54
End: 2019-09-04

## 2019-09-04 VITALS
HEIGHT: 70 IN | WEIGHT: 233 LBS | BODY MASS INDEX: 33.36 KG/M2 | TEMPERATURE: 98.6 F | DIASTOLIC BLOOD PRESSURE: 80 MMHG | SYSTOLIC BLOOD PRESSURE: 122 MMHG

## 2019-09-04 DIAGNOSIS — F51.04 CHRONIC INSOMNIA: Primary | ICD-10-CM

## 2019-09-04 DIAGNOSIS — F32.A ANXIETY AND DEPRESSION: ICD-10-CM

## 2019-09-04 DIAGNOSIS — F41.9 ANXIETY AND DEPRESSION: ICD-10-CM

## 2019-09-04 DIAGNOSIS — E66.01 MORBIDLY OBESE (HCC): ICD-10-CM

## 2019-09-04 DIAGNOSIS — I10 BENIGN ESSENTIAL HTN: ICD-10-CM

## 2019-09-04 PROCEDURE — 99214 OFFICE O/P EST MOD 30 MIN: CPT | Performed by: INTERNAL MEDICINE

## 2019-09-04 RX ORDER — TRAZODONE HYDROCHLORIDE 100 MG/1
100-150 TABLET ORAL NIGHTLY
Qty: 45 TABLET | Refills: 2 | Status: SHIPPED | OUTPATIENT
Start: 2019-09-04 | End: 2020-02-17

## 2019-09-04 NOTE — PROGRESS NOTES
"Subjective   Raj Kuhn is a 54 y.o. male here for follow-up chronic insomnia, anxiety and depression, HTN. HTN: doing very well on his new regimen of meds. His mood has improved and he has more energy. He has tapered off the elavil and hasn't been sleeping well. He has had improvement in erections since coming off it. He is taking trazodone 50mg qhs and it works a bit but not where he would like. Feels fatigued as he is having trouble staying asleep and going to sleep. Didn't go to bed until 4am this morning. He is going to see his knee ortho soon as they have been bothering him.    I have reviewed the following portions of the patient's history and confirmed they are accurate: allergies, current medications, past medical history, past social history, past surgical history and problem list     I have personally completed the patient's review of systems.    Review of Systems:  General: negative  CV: negative  Respiratory: negative  Neuro: negative  Psych: A&D, sleep disturbance  MSK: knee pain, back pain, shoulder pain    Objective   /80 (BP Location: Left arm, Patient Position: Sitting, Cuff Size: Large Adult)   Temp 98.6 °F (37 °C) (Temporal)   Ht 177.8 cm (70\")   Wt 106 kg (233 lb)   BMI 33.43 kg/m²     Physical Exam   Constitutional: He is oriented to person, place, and time. He appears well-developed and well-nourished.   Cardiovascular: Normal rate, regular rhythm and normal heart sounds.   Pulmonary/Chest: Effort normal and breath sounds normal. He has no wheezes. He has no rales.   Neurological: He is alert and oriented to person, place, and time.   Skin: Skin is warm and dry.   Psychiatric: He has a normal mood and affect. His behavior is normal. Thought content normal.   Vitals reviewed.      Assessment/Plan   Raj was seen today for anxiety and depression.    Diagnoses and all orders for this visit:    Chronic insomnia  -     traZODone (DESYREL) 100 MG tablet; Take 1-1.5 tablets by mouth " Every Night.  -increased dose for uncontrolled insomnia    Benign essential HTN  -BP under excellent control, continue current meds  -chronic controlled    Anxiety and depression  -mood controlled. Did have increased anxiety off elavil which has since resolved  -continue current meds    Morbidly obese (CMS/[facility])  Body mass index is 33.43 kg/m².             Yenny Gabriel MA

## 2019-09-08 DIAGNOSIS — K21.9 GASTROESOPHAGEAL REFLUX DISEASE, ESOPHAGITIS PRESENCE NOT SPECIFIED: ICD-10-CM

## 2019-09-09 RX ORDER — OMEPRAZOLE 20 MG/1
CAPSULE, DELAYED RELEASE ORAL
Qty: 30 CAPSULE | Refills: 3 | Status: SHIPPED | OUTPATIENT
Start: 2019-09-09 | End: 2020-01-06

## 2019-09-11 DIAGNOSIS — F32.A ANXIETY AND DEPRESSION: ICD-10-CM

## 2019-09-11 DIAGNOSIS — F41.9 ANXIETY AND DEPRESSION: ICD-10-CM

## 2019-09-11 RX ORDER — ESCITALOPRAM OXALATE 20 MG/1
TABLET ORAL
Qty: 30 TABLET | Refills: 0 | Status: SHIPPED | OUTPATIENT
Start: 2019-09-11 | End: 2019-10-08 | Stop reason: SDUPTHER

## 2019-09-12 ENCOUNTER — OFFICE VISIT (OUTPATIENT)
Dept: INTERNAL MEDICINE | Facility: CLINIC | Age: 54
End: 2019-09-12

## 2019-09-12 VITALS
DIASTOLIC BLOOD PRESSURE: 70 MMHG | RESPIRATION RATE: 20 BRPM | HEIGHT: 70 IN | TEMPERATURE: 96.9 F | SYSTOLIC BLOOD PRESSURE: 132 MMHG | OXYGEN SATURATION: 97 % | BODY MASS INDEX: 33.3 KG/M2 | WEIGHT: 232.6 LBS | HEART RATE: 79 BPM

## 2019-09-12 DIAGNOSIS — Z65.9 SOCIAL PROBLEM: ICD-10-CM

## 2019-09-12 DIAGNOSIS — R68.89 FLU-LIKE SYMPTOMS: Primary | ICD-10-CM

## 2019-09-12 PROCEDURE — 99213 OFFICE O/P EST LOW 20 MIN: CPT | Performed by: INTERNAL MEDICINE

## 2019-09-12 NOTE — PROGRESS NOTES
"Subjective   Raj Kuhn is a 54 y.o. male here for follow-up recent dx of Flu. He went to [facility] after a day of severe body aches and severe fatigue. He denies any HEENT sx, respiratory sx, GI issues. He had a flu test that was negative but he was presumptively treated with tamiflu. He completed that and he is feeling better. He didn't know if he needed a repeat test to prove he was cured. He feels a bit fatigued still but no other sx. He also needs a letter written for him to be able to get a car. He has been awarded disability but up to 15% will come out for restitution. He cannot afford to get a car to get him to appointments (one of his specialists is in [place]) if that much is taken out. He cannot ride a bus to [place] and obviously can't pay for a cab.      The following portions of the patient's history were reviewed and updated as appropriate: past family history, past surgical history and problem list.    Review of Systems:  General: fatigue  HEENT: negative  Respiratory: negative  GI: negative    Objective   /70   Pulse 79   Temp 96.9 °F (36.1 °C) (Temporal)   Resp 20   Ht 177.8 cm (70\")   Wt 106 kg (232 lb 9.6 oz)   SpO2 97%   BMI 33.37 kg/m²     Physical Exam   Constitutional: He is oriented to person, place, and time. He appears well-developed and well-nourished.   Pulmonary/Chest: Effort normal.   Neurological: He is alert and oriented to person, place, and time.   Skin: Skin is warm and dry.   Psychiatric: He has a normal mood and affect. His behavior is normal. Judgment and thought content normal.   Vitals reviewed.      Assessment/Plan   Raj was seen today for flu symptoms.    Diagnoses and all orders for this visit:    Flu-like symptoms  -completed tamiflu, feeling better. Told him influenza testing isn't indicated for resolution. Continue good hygiene to prevent spread    Social problem  -will write letter for pt regarding transportation         "

## 2019-10-08 DIAGNOSIS — F41.9 ANXIETY AND DEPRESSION: ICD-10-CM

## 2019-10-08 DIAGNOSIS — F32.A ANXIETY AND DEPRESSION: ICD-10-CM

## 2019-10-08 DIAGNOSIS — F41.8 ANXIETY ABOUT HEALTH: ICD-10-CM

## 2019-10-08 DIAGNOSIS — I10 BENIGN ESSENTIAL HTN: ICD-10-CM

## 2019-10-08 RX ORDER — HYDRALAZINE HYDROCHLORIDE 25 MG/1
TABLET, FILM COATED ORAL
Qty: 60 TABLET | Refills: 1 | Status: SHIPPED | OUTPATIENT
Start: 2019-10-08 | End: 2019-10-09

## 2019-10-08 RX ORDER — ESCITALOPRAM OXALATE 20 MG/1
TABLET ORAL
Qty: 30 TABLET | Refills: 0 | Status: SHIPPED | OUTPATIENT
Start: 2019-10-08 | End: 2020-04-27

## 2019-10-08 RX ORDER — DIAZEPAM 10 MG/1
TABLET ORAL
Qty: 60 TABLET | Refills: 1 | Status: SHIPPED | OUTPATIENT
Start: 2019-10-08 | End: 2019-10-09

## 2019-10-09 ENCOUNTER — OFFICE VISIT (OUTPATIENT)
Dept: INTERNAL MEDICINE | Facility: CLINIC | Age: 54
End: 2019-10-09

## 2019-10-09 VITALS
HEIGHT: 70 IN | WEIGHT: 232 LBS | DIASTOLIC BLOOD PRESSURE: 80 MMHG | TEMPERATURE: 98.6 F | SYSTOLIC BLOOD PRESSURE: 124 MMHG | BODY MASS INDEX: 33.21 KG/M2

## 2019-10-09 DIAGNOSIS — Z23 NEED FOR INFLUENZA VACCINATION: ICD-10-CM

## 2019-10-09 DIAGNOSIS — R25.2 MUSCLE CRAMPS: Primary | ICD-10-CM

## 2019-10-09 DIAGNOSIS — I10 BENIGN ESSENTIAL HTN: ICD-10-CM

## 2019-10-09 PROCEDURE — 99213 OFFICE O/P EST LOW 20 MIN: CPT | Performed by: INTERNAL MEDICINE

## 2019-10-09 PROCEDURE — G0008 ADMIN INFLUENZA VIRUS VAC: HCPCS | Performed by: INTERNAL MEDICINE

## 2019-10-09 PROCEDURE — 90674 CCIIV4 VAC NO PRSV 0.5 ML IM: CPT | Performed by: INTERNAL MEDICINE

## 2019-10-09 RX ORDER — HYDROCHLOROTHIAZIDE 25 MG/1
12.5 TABLET ORAL DAILY
Qty: 90 TABLET | Refills: 2
Start: 2019-10-09 | End: 2021-06-24 | Stop reason: SDUPTHER

## 2019-10-09 NOTE — PROGRESS NOTES
"Subjective   Raj Kuhn is a 54 y.o. male here for new muscle cramps and f/u HTN. He is doing well on his current regimen of losartan, bystolic, and HCTZ. Last 1-2 weeks he has noticed muscle cramps in his calves, thighs, and hands. They happen at night but with certain movements (crossing leg to tie shoe) it can cause a cramp in his thigh. His fingers also randomly cramp and last about a minute which really worries him. He stopped the HCTZ for 2 days and the cramps stopped. He resumed it and they recurred. They are every day.       I have reviewed the following portions of the patient's history and confirmed they are accurate: current medications, past medical history and problem list     I have personally completed the patient's review of systems.    Review of Systems:  General: negative  CV: negative  Neuro: negative  MSK: see hpi    Objective   /80 (BP Location: Left arm, Patient Position: Sitting, Cuff Size: Large Adult)   Temp 98.6 °F (37 °C) (Temporal)   Ht 177.8 cm (70\")   Wt 105 kg (232 lb)   BMI 33.29 kg/m²     Physical Exam   Constitutional: He is oriented to person, place, and time. He appears well-developed and well-nourished.   Pulmonary/Chest: Effort normal.   Neurological: He is alert and oriented to person, place, and time.   Skin: Skin is warm and dry.   Psychiatric: He has a normal mood and affect. His behavior is normal. Judgment and thought content normal.   Vitals reviewed.      Assessment/Plan   Raj was seen today for follow-up.    Diagnoses and all orders for this visit:    Muscle cramps  -likely from HCTZ as he didn't have cramps 2 days off HCTZ. Halve the dose and see if that makes a difference. Discussed changing to chlorthalidone vs amlodipine if half HCTZ still causes cramping    Benign essential HTN  -     hydroCHLOROthiazide (HYDRODIURIL) 25 MG tablet; Take 0.5 tablets by mouth Daily. Take 1/2 tab as BP seems to be controlled on that or no HCTZ    Need for influenza " vaccination  -     Flucelvax Quad=>4Years ([id number])             Yenny Gabriel MA

## 2019-10-17 DIAGNOSIS — M25.50 CHRONIC JOINT PAIN: ICD-10-CM

## 2019-10-17 DIAGNOSIS — G89.29 CHRONIC JOINT PAIN: ICD-10-CM

## 2019-10-18 RX ORDER — CYCLOBENZAPRINE HCL 10 MG
TABLET ORAL
Qty: 90 TABLET | Refills: 2 | OUTPATIENT
Start: 2019-10-18 | End: 2021-03-15

## 2019-11-07 DIAGNOSIS — F32.A ANXIETY AND DEPRESSION: ICD-10-CM

## 2019-11-07 DIAGNOSIS — F41.9 ANXIETY AND DEPRESSION: ICD-10-CM

## 2019-11-07 RX ORDER — ESCITALOPRAM OXALATE 20 MG/1
TABLET ORAL
Qty: 30 TABLET | Refills: 2 | Status: SHIPPED | OUTPATIENT
Start: 2019-11-07 | End: 2020-04-27

## 2019-12-07 DIAGNOSIS — I10 BENIGN ESSENTIAL HTN: ICD-10-CM

## 2019-12-09 RX ORDER — HYDRALAZINE HYDROCHLORIDE 25 MG/1
TABLET, FILM COATED ORAL
Qty: 60 TABLET | Refills: 0 | Status: SHIPPED | OUTPATIENT
Start: 2019-12-09 | End: 2020-01-06

## 2019-12-13 ENCOUNTER — TELEPHONE (OUTPATIENT)
Dept: INTERNAL MEDICINE | Facility: CLINIC | Age: 54
End: 2019-12-13

## 2019-12-13 NOTE — TELEPHONE ENCOUNTER
PT CALLED ASKING IF HE CAN  SAMPLES OF 10MG BYSTOLIC ON Tuesday FROM THE OFFICE    PLEASE ADVISE AND GIVE CALL BACK

## 2019-12-26 DIAGNOSIS — F41.8 ANXIETY ABOUT HEALTH: ICD-10-CM

## 2019-12-27 ENCOUNTER — TELEPHONE (OUTPATIENT)
Dept: INTERNAL MEDICINE | Facility: CLINIC | Age: 54
End: 2019-12-27

## 2019-12-27 DIAGNOSIS — F41.8 ANXIETY ABOUT HEALTH: ICD-10-CM

## 2019-12-30 DIAGNOSIS — F41.8 ANXIETY ABOUT HEALTH: ICD-10-CM

## 2019-12-30 RX ORDER — DIAZEPAM 10 MG/1
TABLET ORAL
Qty: 60 TABLET | Refills: 0 | OUTPATIENT
Start: 2019-12-30

## 2019-12-30 RX ORDER — DIAZEPAM 10 MG/1
TABLET ORAL
Qty: 60 TABLET | Refills: 0 | Status: SHIPPED | OUTPATIENT
Start: 2019-12-30 | End: 2020-01-06

## 2019-12-30 RX ORDER — DIAZEPAM 10 MG/1
5-10 TABLET ORAL 2 TIMES DAILY PRN
Qty: 60 TABLET | Refills: 2 | OUTPATIENT
Start: 2019-12-30

## 2020-01-06 DIAGNOSIS — I10 BENIGN ESSENTIAL HTN: ICD-10-CM

## 2020-01-06 DIAGNOSIS — F41.8 ANXIETY ABOUT HEALTH: ICD-10-CM

## 2020-01-06 DIAGNOSIS — K21.9 GASTROESOPHAGEAL REFLUX DISEASE, ESOPHAGITIS PRESENCE NOT SPECIFIED: ICD-10-CM

## 2020-01-06 RX ORDER — OMEPRAZOLE 20 MG/1
CAPSULE, DELAYED RELEASE ORAL
Qty: 30 CAPSULE | Refills: 3 | Status: SHIPPED | OUTPATIENT
Start: 2020-01-06 | End: 2020-05-15

## 2020-01-06 RX ORDER — DIAZEPAM 10 MG/1
TABLET ORAL
Qty: 60 TABLET | Refills: 1 | Status: SHIPPED | OUTPATIENT
Start: 2020-01-06 | End: 2020-01-28 | Stop reason: SDUPTHER

## 2020-01-06 RX ORDER — HYDRALAZINE HYDROCHLORIDE 25 MG/1
TABLET, FILM COATED ORAL
Qty: 60 TABLET | Refills: 0 | Status: SHIPPED | OUTPATIENT
Start: 2020-01-06 | End: 2020-02-05

## 2020-01-07 ENCOUNTER — TELEPHONE (OUTPATIENT)
Dept: INTERNAL MEDICINE | Facility: CLINIC | Age: 55
End: 2020-01-07

## 2020-01-07 DIAGNOSIS — I10 BENIGN ESSENTIAL HTN: ICD-10-CM

## 2020-01-07 RX ORDER — NEBIVOLOL 10 MG/1
10 TABLET ORAL DAILY
Qty: 90 TABLET | Refills: 2 | Status: SHIPPED | OUTPATIENT
Start: 2020-01-07 | End: 2020-04-27

## 2020-01-15 ENCOUNTER — TRANSCRIBE ORDERS (OUTPATIENT)
Dept: LAB | Facility: HOSPITAL | Age: 55
End: 2020-01-15

## 2020-01-15 ENCOUNTER — LAB (OUTPATIENT)
Dept: LAB | Facility: HOSPITAL | Age: 55
End: 2020-01-15

## 2020-01-15 ENCOUNTER — HOSPITAL ENCOUNTER (OUTPATIENT)
Dept: CARDIOLOGY | Facility: HOSPITAL | Age: 55
Discharge: HOME | End: 2020-01-15
Admitting: ANESTHESIOLOGY

## 2020-01-15 DIAGNOSIS — I10 HYPERTENSION, UNSPECIFIED TYPE: ICD-10-CM

## 2020-01-15 DIAGNOSIS — I10 HYPERTENSION, UNSPECIFIED TYPE: Primary | ICD-10-CM

## 2020-01-15 LAB
ANION GAP SERPL CALCULATED.3IONS-SCNC: 14.4 MMOL/L (ref 5–15)
BUN BLD-MCNC: 14 MG/DL (ref 6–20)
BUN/CREAT SERPL: 17.3 (ref 7–25)
CALCIUM SPEC-SCNC: 10.2 MG/DL (ref 8.6–10.5)
CHLORIDE SERPL-SCNC: 100 MMOL/L (ref 98–107)
CO2 SERPL-SCNC: 27.6 MMOL/L (ref 22–29)
CREAT BLD-MCNC: 0.81 MG/DL (ref 0.76–1.27)
DEPRECATED RDW RBC AUTO: 38.9 FL (ref 37–54)
ERYTHROCYTE [DISTWIDTH] IN BLOOD BY AUTOMATED COUNT: 11.3 % (ref 12.3–15.4)
GFR SERPL CREATININE-BSD FRML MDRD: 99 ML/MIN/1.73
GLUCOSE BLD-MCNC: 84 MG/DL (ref 65–99)
HCT VFR BLD AUTO: 50 % (ref 37.5–51)
HGB BLD-MCNC: 17.4 G/DL (ref 13–17.7)
MCH RBC QN AUTO: 32.8 PG (ref 26.6–33)
MCHC RBC AUTO-ENTMCNC: 34.8 G/DL (ref 31.5–35.7)
MCV RBC AUTO: 94.2 FL (ref 79–97)
PLATELET # BLD AUTO: 200 10*3/MM3 (ref 140–450)
PMV BLD AUTO: 10.2 FL (ref 6–12)
POTASSIUM BLD-SCNC: 4.5 MMOL/L (ref 3.5–5.2)
RBC # BLD AUTO: 5.31 10*6/MM3 (ref 4.14–5.8)
SODIUM BLD-SCNC: 142 MMOL/L (ref 136–145)
WBC NRBC COR # BLD: 8.24 10*3/MM3 (ref 3.4–10.8)

## 2020-01-15 PROCEDURE — 85027 COMPLETE CBC AUTOMATED: CPT

## 2020-01-15 PROCEDURE — 36415 COLL VENOUS BLD VENIPUNCTURE: CPT

## 2020-01-15 PROCEDURE — 93005 ELECTROCARDIOGRAM TRACING: CPT | Performed by: ANESTHESIOLOGY

## 2020-01-15 PROCEDURE — 80048 BASIC METABOLIC PNL TOTAL CA: CPT

## 2020-01-28 DIAGNOSIS — F41.8 ANXIETY ABOUT HEALTH: ICD-10-CM

## 2020-01-28 RX ORDER — DIAZEPAM 10 MG/1
5-10 TABLET ORAL 2 TIMES DAILY PRN
Qty: 60 TABLET | Refills: 1 | Status: SHIPPED | OUTPATIENT
Start: 2020-01-28 | End: 2020-04-27 | Stop reason: SDUPTHER

## 2020-01-28 NOTE — TELEPHONE ENCOUNTER
PT called in reference to the following medication, states he's completely out of med at this time: diazePAM (VALIUM) 10 MG tablet.    PT states he thought PCP sent refill in for him earlier in the month, but he can't find where the script was filled/sent to. PT states he reached out to [facility], who told him they were unable to fill the medication because [facility] had already filled it. PT contacted [facility] and was told they never received the script. PT does not have [facility] listed as a preferred pharmacy in chart, states he'll eventually switch all his scripts there.     Per chart, it looks as though [facility] in [place] confirmed the refill on 1/6/20. PT would like clinical staff to track his script and find out if it was filled and where it was sent.     Confirmed Pharmacy: [id number] - [place], [place] - [address] - [phone]  - [phone] FX.    Please call PT back: [phone]

## 2020-02-04 ENCOUNTER — OFFICE VISIT (OUTPATIENT)
Dept: INTERNAL MEDICINE | Facility: CLINIC | Age: 55
End: 2020-02-04

## 2020-02-04 VITALS
DIASTOLIC BLOOD PRESSURE: 62 MMHG | WEIGHT: 235 LBS | TEMPERATURE: 97.4 F | SYSTOLIC BLOOD PRESSURE: 115 MMHG | HEIGHT: 70 IN | BODY MASS INDEX: 33.64 KG/M2

## 2020-02-04 DIAGNOSIS — G89.18 ACUTE POSTOPERATIVE PAIN: ICD-10-CM

## 2020-02-04 DIAGNOSIS — R68.89 FLU-LIKE SYMPTOMS: Primary | ICD-10-CM

## 2020-02-04 LAB
EXPIRATION DATE: NORMAL
FLUAV AG NPH QL: NEGATIVE
FLUBV AG NPH QL: NEGATIVE
INTERNAL CONTROL: NORMAL
Lab: NORMAL

## 2020-02-04 PROCEDURE — 87804 INFLUENZA ASSAY W/OPTIC: CPT | Performed by: INTERNAL MEDICINE

## 2020-02-04 PROCEDURE — 99213 OFFICE O/P EST LOW 20 MIN: CPT | Performed by: INTERNAL MEDICINE

## 2020-02-04 RX ORDER — LIDOCAINE 50 MG/G
1 PATCH TOPICAL EVERY 24 HOURS
Qty: 30 EACH | Refills: 1 | Status: SHIPPED | OUTPATIENT
Start: 2020-02-04 | End: 2021-03-19

## 2020-02-04 NOTE — PROGRESS NOTES
"Subjective   Raj Kuhn is a 54 y.o. male here for flulike symptoms.  He said 2 days ago he developed nausea, vomiting, abdominal pain and cramping.  He also has myalgias and overall feels unwell.  He has no sick contacts.  He has not eaten anything out of the ordinary.  He denies any diarrhea but did say he had several bowel movements in 1 day which is not normal for him.  He also did have a fever.  He recently had left knee arthroscope he and a cleanout of osteophytes done by Dr. Sandoval in [place].  Dr. Sandoval has seen him for a number of years.  Patient was given Norco 10 mg for postop pain and he is regularly on Percocet for his chronic pain per pain management.  He has run out of his postop pain is having a lot of pain in the knee.  He does not really know what to do now.  He does not think his Ortho will give him any more pain medication.  He wonders what he can do until the pain improves.    I have reviewed the following portions of the patient's history and confirmed they are accurate: current medications, past medical history, past surgical history and problem list     I have personally completed the patient's review of systems.    Review of Systems:  General: Fatigue  HEENT: Negative  Respiratory: negative  GI: See HPI  Skin: See HPI  MSK: Knee pain and myalgias    Objective   /62 (BP Location: Left arm, Patient Position: Sitting, Cuff Size: Adult)   Temp 97.4 °F (36.3 °C) (Temporal)   Ht 177.8 cm (70\")   Wt 107 kg (235 lb)   BMI 33.72 kg/m²     Physical Exam   Constitutional: He is oriented to person, place, and time. He appears well-developed and well-nourished.   Appears ill but nontoxic   Pulmonary/Chest: Effort normal.   Musculoskeletal:   Left knee with 2 small surgical incisions C/D/I   Neurological: He is alert and oriented to person, place, and time.   Skin: Skin is warm and dry. There is pallor.   Bruising on left thigh and left lower leg, no signs of infection   Psychiatric: He " has a normal mood and affect. His behavior is normal. Judgment and thought content normal.   Vitals reviewed.      Assessment/Plan   Raj was seen today for follow-up.    Diagnoses and all orders for this visit:    Flu-like symptoms  -     POCT Influenza A/B: Negative.  Suspect acute gastroenteritis.  Told him to continue bland diet, push fluids, Phenergan as needed for nausea, good hygiene    Acute postoperative pain  -Has run out of his postop pain regimen.  Advised him that I cannot really give him anything that is a controlled substance as he has a pain contract with pain management and any postop pain should be taking care of by Ortho.  He is agreeable.  I will send in lidocaine patches that he can use on the knee and hopefully that will give him some relief.           Yenny Gabriel MA    Please note that portions of this note were completed with a voice recognition program. Efforts were made to edit the dictations, but occasionally words are mistranscribed.

## 2020-02-05 DIAGNOSIS — I10 BENIGN ESSENTIAL HTN: ICD-10-CM

## 2020-02-05 DIAGNOSIS — F41.9 ANXIETY AND DEPRESSION: ICD-10-CM

## 2020-02-05 DIAGNOSIS — F32.A ANXIETY AND DEPRESSION: ICD-10-CM

## 2020-02-05 RX ORDER — HYDRALAZINE HYDROCHLORIDE 25 MG/1
TABLET, FILM COATED ORAL
Qty: 60 TABLET | Refills: 0 | Status: SHIPPED | OUTPATIENT
Start: 2020-02-05 | End: 2020-03-10

## 2020-02-05 RX ORDER — ESCITALOPRAM OXALATE 20 MG/1
TABLET ORAL
Qty: 30 TABLET | Refills: 0 | Status: SHIPPED | OUTPATIENT
Start: 2020-02-05 | End: 2020-02-17

## 2020-02-15 DIAGNOSIS — F41.9 ANXIETY AND DEPRESSION: ICD-10-CM

## 2020-02-15 DIAGNOSIS — F32.A ANXIETY AND DEPRESSION: ICD-10-CM

## 2020-02-15 DIAGNOSIS — F51.04 CHRONIC INSOMNIA: ICD-10-CM

## 2020-02-17 RX ORDER — TRAZODONE HYDROCHLORIDE 100 MG/1
TABLET ORAL
Qty: 45 TABLET | Refills: 0 | Status: SHIPPED | OUTPATIENT
Start: 2020-02-17 | End: 2020-05-01

## 2020-02-17 RX ORDER — ESCITALOPRAM OXALATE 20 MG/1
TABLET ORAL
Qty: 30 TABLET | Refills: 0 | Status: SHIPPED | OUTPATIENT
Start: 2020-02-17 | End: 2020-04-27

## 2020-02-20 DIAGNOSIS — I10 BENIGN ESSENTIAL HTN: ICD-10-CM

## 2020-02-20 RX ORDER — LOSARTAN POTASSIUM 100 MG/1
TABLET ORAL
Qty: 90 TABLET | Refills: 0 | Status: SHIPPED | OUTPATIENT
Start: 2020-02-20 | End: 2020-06-16

## 2020-03-09 DIAGNOSIS — A60.01 HERPES SIMPLEX INFECTION OF PENIS: ICD-10-CM

## 2020-03-09 RX ORDER — VALACYCLOVIR HYDROCHLORIDE 1 G/1
1000 TABLET, FILM COATED ORAL DAILY
Qty: 30 TABLET | Refills: 5 | Status: SHIPPED | OUTPATIENT
Start: 2020-03-09 | End: 2020-09-21

## 2020-03-10 DIAGNOSIS — I10 BENIGN ESSENTIAL HTN: ICD-10-CM

## 2020-03-10 RX ORDER — HYDRALAZINE HYDROCHLORIDE 25 MG/1
TABLET, FILM COATED ORAL
Qty: 60 TABLET | Refills: 0 | Status: SHIPPED | OUTPATIENT
Start: 2020-03-10 | End: 2020-04-09

## 2020-03-18 ENCOUNTER — TELEPHONE (OUTPATIENT)
Dept: INTERNAL MEDICINE | Facility: CLINIC | Age: 55
End: 2020-03-18

## 2020-03-18 NOTE — TELEPHONE ENCOUNTER
Spoke with him. He sts he is having some all over joint pain. He has spoke with his pain mgmt group and they didn't seem to want to do anything about it besides look at him like he was crazy. His hip and back seem to be worse than others. He had a shot last year and ask them for another one and they told him he could only have one per year.     He would like to try to avoid coming in if her can.

## 2020-04-09 DIAGNOSIS — F32.A ANXIETY AND DEPRESSION: ICD-10-CM

## 2020-04-09 DIAGNOSIS — I10 BENIGN ESSENTIAL HTN: ICD-10-CM

## 2020-04-09 DIAGNOSIS — F41.9 ANXIETY AND DEPRESSION: ICD-10-CM

## 2020-04-09 RX ORDER — HYDRALAZINE HYDROCHLORIDE 25 MG/1
TABLET, FILM COATED ORAL
Qty: 60 TABLET | Refills: 0 | Status: SHIPPED | OUTPATIENT
Start: 2020-04-09 | End: 2020-04-27

## 2020-04-09 RX ORDER — ESCITALOPRAM OXALATE 20 MG/1
TABLET ORAL
Qty: 30 TABLET | Refills: 2 | Status: SHIPPED | OUTPATIENT
Start: 2020-04-09 | End: 2020-07-16

## 2020-04-27 ENCOUNTER — TELEMEDICINE (OUTPATIENT)
Dept: INTERNAL MEDICINE | Facility: CLINIC | Age: 55
End: 2020-04-27

## 2020-04-27 DIAGNOSIS — M25.561 CHRONIC PAIN OF BOTH KNEES: ICD-10-CM

## 2020-04-27 DIAGNOSIS — G89.29 CHRONIC RIGHT SHOULDER PAIN: ICD-10-CM

## 2020-04-27 DIAGNOSIS — F41.9 ANXIETY: Primary | ICD-10-CM

## 2020-04-27 DIAGNOSIS — G89.29 CHRONIC PAIN OF BOTH KNEES: ICD-10-CM

## 2020-04-27 DIAGNOSIS — M25.562 CHRONIC PAIN OF BOTH KNEES: ICD-10-CM

## 2020-04-27 DIAGNOSIS — M25.511 CHRONIC RIGHT SHOULDER PAIN: ICD-10-CM

## 2020-04-27 PROCEDURE — 99214 OFFICE O/P EST MOD 30 MIN: CPT | Performed by: INTERNAL MEDICINE

## 2020-04-27 RX ORDER — DIAZEPAM 10 MG/1
5-10 TABLET ORAL EVERY 8 HOURS PRN
Qty: 90 TABLET | Refills: 1 | Status: SHIPPED | OUTPATIENT
Start: 2020-04-27 | End: 2020-07-07 | Stop reason: SDUPTHER

## 2020-04-27 NOTE — PROGRESS NOTES
Subjective   Raj Kuhn is a 54 y.o. male seen via telehealth visit with interactive audio and video for anxiety and chronic pain.  Anxiety: Anxiety much worse lately during the pandemic.  He feels very socially isolated as he lives alone.  He says he feels very anxious most of the time and has even had panic attacks.  He is on Valium 5 to 10 mg twice daily which does help, but it seems to wear out in the middle the day.  He is having trouble sleeping.  He feels very overwhelmed with what is going on and he is scared to get sick with his health problems.  He continues to have chronic knee pain and chronic right shoulder pain.  He sees several different Ortho specialist.  He needs a knee replacement.  This also stresses him out and makes his anxiety worse.  He does see pain management.  His Percocet was switched to Opana and the transition has not been good.    I have reviewed the following portions of the patient's history and confirmed they are accurate: current medications, past medical history, past social history, past surgical history and problem list     I have personally completed the patient's review of systems.    Review of Systems:  General: negative  CV: negative  Respiratory: negative  Neuro: negative  Psych: Anxiety, insomnia  MSK: Knee and shoulder pain, hip pain    Objective   There were no vitals taken for this visit.    Physical Exam   Constitutional: He is oriented to person, place, and time. He appears well-developed and well-nourished.   Pulmonary/Chest: Effort normal.   Neurological: He is alert and oriented to person, place, and time.   Skin: Skin is warm and dry.   Psychiatric: His behavior is normal. Judgment and thought content normal.   Mildly tearful   Vitals reviewed.      Assessment/Plan   Diagnoses and all orders for this visit:    Anxiety  -     diazePAM (VALIUM) 10 MG tablet; Take 0.5-1 tablets by mouth Every 8 (Eight) Hours As Needed for Anxiety. for anxiety    Chronic pain of  both knees  -continue pain meds, to see ortho soon for possible knee replacement    Chronic right shoulder pain  -worse lately, advised him to get with ortho regarding this as well           Shabnam Zimmer MD    Please note that portions of this note were completed with a voice recognition program. Efforts were made to edit the dictations, but occasionally words are mistranscribed.

## 2020-04-30 DIAGNOSIS — F51.04 CHRONIC INSOMNIA: ICD-10-CM

## 2020-05-01 RX ORDER — TRAZODONE HYDROCHLORIDE 100 MG/1
TABLET ORAL
Qty: 45 TABLET | Refills: 0 | Status: SHIPPED | OUTPATIENT
Start: 2020-05-01 | End: 2020-06-08

## 2020-05-15 DIAGNOSIS — I10 BENIGN ESSENTIAL HTN: ICD-10-CM

## 2020-05-15 DIAGNOSIS — K21.9 GASTROESOPHAGEAL REFLUX DISEASE, ESOPHAGITIS PRESENCE NOT SPECIFIED: ICD-10-CM

## 2020-05-15 RX ORDER — HYDRALAZINE HYDROCHLORIDE 25 MG/1
TABLET, FILM COATED ORAL
Qty: 60 TABLET | Refills: 0 | Status: SHIPPED | OUTPATIENT
Start: 2020-05-15 | End: 2020-06-16

## 2020-05-15 RX ORDER — OMEPRAZOLE 20 MG/1
CAPSULE, DELAYED RELEASE ORAL
Qty: 30 CAPSULE | Refills: 0 | Status: SHIPPED | OUTPATIENT
Start: 2020-05-15 | End: 2020-06-16

## 2020-06-06 DIAGNOSIS — F51.04 CHRONIC INSOMNIA: ICD-10-CM

## 2020-06-08 RX ORDER — TRAZODONE HYDROCHLORIDE 100 MG/1
TABLET ORAL
Qty: 45 TABLET | Refills: 2 | Status: SHIPPED | OUTPATIENT
Start: 2020-06-08 | End: 2020-09-21

## 2020-06-16 DIAGNOSIS — K21.9 GASTROESOPHAGEAL REFLUX DISEASE, ESOPHAGITIS PRESENCE NOT SPECIFIED: ICD-10-CM

## 2020-06-16 DIAGNOSIS — I10 BENIGN ESSENTIAL HTN: ICD-10-CM

## 2020-06-16 RX ORDER — LOSARTAN POTASSIUM 100 MG/1
TABLET ORAL
Qty: 90 TABLET | Refills: 2 | Status: SHIPPED | OUTPATIENT
Start: 2020-06-16 | End: 2021-05-24 | Stop reason: SDUPTHER

## 2020-06-16 RX ORDER — OMEPRAZOLE 20 MG/1
CAPSULE, DELAYED RELEASE ORAL
Qty: 30 CAPSULE | Refills: 2 | Status: SHIPPED | OUTPATIENT
Start: 2020-06-16 | End: 2021-03-19

## 2020-06-16 RX ORDER — HYDRALAZINE HYDROCHLORIDE 25 MG/1
TABLET, FILM COATED ORAL
Qty: 60 TABLET | Refills: 2 | Status: SHIPPED | OUTPATIENT
Start: 2020-06-16 | End: 2020-06-26

## 2020-06-25 ENCOUNTER — TELEPHONE (OUTPATIENT)
Dept: INTERNAL MEDICINE | Facility: CLINIC | Age: 55
End: 2020-06-25

## 2020-06-25 NOTE — TELEPHONE ENCOUNTER
ABOUT 11:20 AM TODAY PATIENT STARTED EXPERIENCING CHILLS AND HAD A FEVER .2    TOOK TWO TYNELOL MAX ABOUT 45 MINUTES AGO AND IT HASN'T BROUGHT FEVER DOWN YET.     PATIENT CALL BACK PH: [phone]

## 2020-06-25 NOTE — TELEPHONE ENCOUNTER
Spoke with patient. He sts he has a fever, chills and fatigue. He did also state he has been outside in the heat for over an hour due to his car breaking down. He is really concerned about Covid-19. Advised he could go to [facility] to be tested. He ask about the [facility] clinic in [facility]. Advised patient to call ahead and let them know he was coming and what symptoms he is having.

## 2020-06-26 PROCEDURE — U0002 COVID-19 LAB TEST NON-CDC: HCPCS | Performed by: NURSE PRACTITIONER

## 2020-06-29 ENCOUNTER — TELEPHONE (OUTPATIENT)
Dept: INTERNAL MEDICINE | Facility: CLINIC | Age: 55
End: 2020-06-29

## 2020-06-29 NOTE — TELEPHONE ENCOUNTER
Still pending.  It will be [facility] that calls him as they are the ones that did it.

## 2020-06-29 NOTE — TELEPHONE ENCOUNTER
Patient called and stated that he was tested for COVID-19 on 6/26/20 and has yet to get his results.  Patient is asking for a call from Dr. Zimmer.      Patient callback: [phone]    Please advise

## 2020-06-29 NOTE — TELEPHONE ENCOUNTER
Called and spoke to patient, informed of pending results. He verbalized understanding and had no further questions.

## 2020-07-07 DIAGNOSIS — F41.9 ANXIETY: ICD-10-CM

## 2020-07-07 RX ORDER — DIAZEPAM 10 MG/1
5-10 TABLET ORAL EVERY 8 HOURS PRN
Qty: 90 TABLET | Refills: 1 | Status: SHIPPED | OUTPATIENT
Start: 2020-07-07 | End: 2020-09-14

## 2020-07-16 DIAGNOSIS — F32.A ANXIETY AND DEPRESSION: ICD-10-CM

## 2020-07-16 DIAGNOSIS — F41.9 ANXIETY AND DEPRESSION: ICD-10-CM

## 2020-07-16 RX ORDER — ESCITALOPRAM OXALATE 20 MG/1
TABLET ORAL
Qty: 30 TABLET | Refills: 0 | Status: SHIPPED | OUTPATIENT
Start: 2020-07-16 | End: 2020-08-28

## 2020-07-29 ENCOUNTER — OFFICE VISIT (OUTPATIENT)
Dept: INTERNAL MEDICINE | Facility: CLINIC | Age: 55
End: 2020-07-29

## 2020-07-29 VITALS
SYSTOLIC BLOOD PRESSURE: 134 MMHG | WEIGHT: 247 LBS | BODY MASS INDEX: 34.58 KG/M2 | TEMPERATURE: 97.1 F | HEART RATE: 54 BPM | RESPIRATION RATE: 18 BRPM | OXYGEN SATURATION: 96 % | DIASTOLIC BLOOD PRESSURE: 82 MMHG | HEIGHT: 71 IN

## 2020-07-29 DIAGNOSIS — M62.830 SPASM OF MUSCLE OF LOWER BACK: Primary | ICD-10-CM

## 2020-07-29 PROCEDURE — 99213 OFFICE O/P EST LOW 20 MIN: CPT | Performed by: INTERNAL MEDICINE

## 2020-07-29 PROCEDURE — 96372 THER/PROPH/DIAG INJ SC/IM: CPT | Performed by: INTERNAL MEDICINE

## 2020-07-29 RX ORDER — KETOROLAC TROMETHAMINE 30 MG/ML
60 INJECTION, SOLUTION INTRAMUSCULAR; INTRAVENOUS ONCE
Status: COMPLETED | OUTPATIENT
Start: 2020-07-29 | End: 2020-07-29

## 2020-07-29 RX ADMIN — KETOROLAC TROMETHAMINE 60 MG: 30 INJECTION, SOLUTION INTRAMUSCULAR; INTRAVENOUS at 16:15

## 2020-07-29 NOTE — PROGRESS NOTES
"     Office Note      Date: 2020  Patient Name: Raj Kuhn  MRN: [id number]  : 1965    Chief Complaint   Patient presents with   • Spasms     back x 1 day       History of Present Illness: Raj Kuhn is a 54 y.o. male who presents for Spasms (back x 1 day). Lifted a 1/2 gallon box yesterday and may have twisted the wrong way.   Pain radiates down both legs.    Follows w/ pain clinic and is on oxy and flexeril.   No urinary/bowel incontinence.     Subjective      Review of Systems:   Pertinent review of systems per HPI.  No urinary or bowel incontinence  No weakness or numbness in lower extremities  Allergies   Allergen Reactions   • Prednisone Mental Status Change       Objective     Physical Exam:  Vital Signs:   Vitals:    20 1205   BP: 134/82   Pulse: 54   Resp: 18   Temp: 97.1 °F (36.2 °C)   TempSrc: Temporal   SpO2: 96%   Weight: 112 kg (247 lb)   Height: 180.3 cm (71\")       Physical Exam   Constitutional: He is oriented to person, place, and time. He appears well-developed and well-nourished. No distress.   Musculoskeletal:   Tender to palpation of bilateral flank regions, nontender to palpation of spine   Neurological: He is alert and oriented to person, place, and time. No cranial nerve deficit.   Skin: He is not diaphoretic.       Assessment / Plan      Assessment & Plan:    1. Spasm of muscle of lower back  Renal function reviewed and with normal limits.  Toradol IM 60 mg given once a day.  Advised stretching and conservative treatment.      Nely Dawson MD  2020     Please note that portions of this note may have been completed with a voice recognition program. Efforts were made to edit the dictations, but occasionally words are mistranscribed.  "

## 2020-08-04 RX ORDER — TADALAFIL 20 MG/1
20 TABLET ORAL DAILY PRN
Qty: 4 TABLET | Refills: 2 | Status: SHIPPED | OUTPATIENT
Start: 2020-08-04 | End: 2021-07-21 | Stop reason: SDUPTHER

## 2020-08-21 DIAGNOSIS — I10 BENIGN ESSENTIAL HTN: ICD-10-CM

## 2020-08-21 RX ORDER — NEBIVOLOL HYDROCHLORIDE 10 MG/1
TABLET ORAL
Qty: 90 TABLET | Refills: 0 | Status: SHIPPED | OUTPATIENT
Start: 2020-08-21 | End: 2020-12-02

## 2020-08-28 DIAGNOSIS — F41.9 ANXIETY AND DEPRESSION: ICD-10-CM

## 2020-08-28 DIAGNOSIS — F32.A ANXIETY AND DEPRESSION: ICD-10-CM

## 2020-08-28 RX ORDER — ESCITALOPRAM OXALATE 20 MG/1
TABLET ORAL
Qty: 30 TABLET | Refills: 2 | Status: SHIPPED | OUTPATIENT
Start: 2020-08-28 | End: 2020-11-19

## 2020-08-31 ENCOUNTER — TELEPHONE (OUTPATIENT)
Dept: INTERNAL MEDICINE | Facility: CLINIC | Age: 55
End: 2020-08-31

## 2020-08-31 NOTE — TELEPHONE ENCOUNTER
Patient has scheduled his wellness for November.  He will come in next week for his flu shot.  Thank you.

## 2020-09-12 DIAGNOSIS — F41.9 ANXIETY: ICD-10-CM

## 2020-09-14 RX ORDER — DIAZEPAM 10 MG/1
TABLET ORAL
Qty: 90 TABLET | Refills: 0 | Status: SHIPPED | OUTPATIENT
Start: 2020-09-14 | End: 2020-10-16

## 2020-09-20 DIAGNOSIS — F51.04 CHRONIC INSOMNIA: ICD-10-CM

## 2020-09-20 DIAGNOSIS — A60.01 HERPES SIMPLEX INFECTION OF PENIS: ICD-10-CM

## 2020-09-21 RX ORDER — TRAZODONE HYDROCHLORIDE 100 MG/1
TABLET ORAL
Qty: 45 TABLET | Refills: 0 | Status: SHIPPED | OUTPATIENT
Start: 2020-09-21 | End: 2020-10-20

## 2020-09-21 RX ORDER — VALACYCLOVIR HYDROCHLORIDE 1 G/1
TABLET, FILM COATED ORAL
Qty: 30 TABLET | Refills: 0 | Status: SHIPPED | OUTPATIENT
Start: 2020-09-21 | End: 2020-10-20

## 2020-09-25 ENCOUNTER — TELEPHONE (OUTPATIENT)
Dept: INTERNAL MEDICINE | Facility: CLINIC | Age: 55
End: 2020-09-25

## 2020-09-25 ENCOUNTER — OFFICE VISIT (OUTPATIENT)
Dept: INTERNAL MEDICINE | Facility: CLINIC | Age: 55
End: 2020-09-25

## 2020-09-25 VITALS
WEIGHT: 244 LBS | BODY MASS INDEX: 35.01 KG/M2 | HEART RATE: 60 BPM | DIASTOLIC BLOOD PRESSURE: 82 MMHG | RESPIRATION RATE: 16 BRPM | TEMPERATURE: 96.9 F | OXYGEN SATURATION: 97 % | SYSTOLIC BLOOD PRESSURE: 138 MMHG

## 2020-09-25 DIAGNOSIS — J01.10 ACUTE NON-RECURRENT FRONTAL SINUSITIS: ICD-10-CM

## 2020-09-25 DIAGNOSIS — B35.0: Primary | ICD-10-CM

## 2020-09-25 DIAGNOSIS — R53.82 CHRONIC FATIGUE: ICD-10-CM

## 2020-09-25 DIAGNOSIS — N48.9 PENILE LESION: ICD-10-CM

## 2020-09-25 PROCEDURE — 99214 OFFICE O/P EST MOD 30 MIN: CPT | Performed by: INTERNAL MEDICINE

## 2020-09-25 RX ORDER — PSEUDOEPHED/ACETAMINOPH/DIPHEN 30MG-500MG
TABLET ORAL
COMMUNITY
Start: 2020-08-03 | End: 2021-03-19

## 2020-09-25 RX ORDER — AZITHROMYCIN 250 MG/1
TABLET, FILM COATED ORAL
Qty: 6 TABLET | Refills: 0 | Status: SHIPPED | OUTPATIENT
Start: 2020-09-25 | End: 2020-12-02

## 2020-09-25 NOTE — TELEPHONE ENCOUNTER
Pharmacy Name: [id number] - [place], [place] - [address] - [phone]  - [phone]   [phone]    Pharmacy representative name: MARTINE    Pharmacy representative phone number: [phone]  What medication are you calling in regards to: Ketoconazole 2 % gel    What question does the pharmacy have:   PHARMACY IS WANTING TO KNOW IF THEY COULD SWITCH THIS OUT FOR A CREAM SINCE THERE IS NOT A GENERIC FORM OF THIS GEL  Who is the provider that prescribed the medication: DR MUKHERJEE

## 2020-09-28 NOTE — PROGRESS NOTES
Subjective   Raj Kuhn is a 55 y.o. male here for several acute complaints.  He has an itchy rash in his beard, a penile mass, fatigue, sinus symptoms.  Rashes been present for several months.  It is present on the right side of his face and in his eyebrows.  It is itchy and red.  He has not put anything on it.  He does notice with shaving it gets worse.  He is also noticed a penile mass in the base of his penis, nontender.  He has no  symptoms.  It is not enlarging or painful.  He just felt something there and wondered if it was normal.  He has chronic fatigue, wakes up feeling extremely tired and has no energy throughout the day.  He is on opiate and benzodiazepine on a daily basis.  He also has sinus congestion and pressure, postnasal drip.  No fever, chills, cough or shortness of breath.  He thinks he has a sinus infection.  He has had the symptoms for several days.    I have reviewed the following portions of the patient's history and confirmed they are accurate: current medications, past medical history, past social history, past surgical history and problem list     I have personally completed the patient's review of systems.    Review of Systems:  General: Fatigue  HEENT: See HPI  Respiratory: negative  Neuro: negative  Psych: negative  : See HPI  Skin: Rash    Objective   /82 (BP Location: Left arm, Patient Position: Sitting, Cuff Size: Adult)   Pulse 60   Temp 96.9 °F (36.1 °C) (Temporal)   Resp 16   Wt 111 kg (244 lb)   SpO2 97%   BMI 35.01 kg/m²     Physical Exam  Vitals signs reviewed. Exam conducted with a chaperone present.   Constitutional:       Appearance: Normal appearance. He is well-developed. He is obese.   HENT:      Head: Normocephalic and atraumatic.   Pulmonary:      Effort: Pulmonary effort is normal.   Genitourinary:     Penis: Normal and circumcised. No erythema, tenderness or swelling.           Comments: Area of concern voiced by patient, normal anatomy felt, no  masses.  Chaperone present during penile exam (MA)  Skin:     General: Skin is warm and dry.      Comments: Mild erythematous rash with scale on right side of beard and and eyebrows   Neurological:      Mental Status: He is alert and oriented to person, place, and time.   Psychiatric:         Behavior: Behavior normal.         Thought Content: Thought content normal.         Judgment: Judgment normal.         Assessment/Plan   Raj was seen today for knee pain and mass.    Diagnoses and all orders for this visit:    Beard dermatophytosis  -     Ketoconazole 2 % gel; Use twice daily over affected areas.  -New problem requiring treatment    Penile lesion  -No obvious mass or abnormality felt.  Told patient if he notices that it enlarges, becomes painful, has new symptoms, or wants to see urologist for second opinion, please let me know    Chronic fatigue  -Multifactorial, advised him to cut back on his sleeping medication as that in combination with his opiate and benzodiazepine is likely leading to somnolence    Acute non-recurrent frontal sinusitis  -     azithromycin (Zithromax Z-Michael) 250 MG tablet; Take 2 tablets the first day, then 1 tablet daily for 4 days.             Shabnam Zimmer MD    Please note that portions of this note were completed with a voice recognition program. Efforts were made to edit the dictations, but occasionally words are mistranscribed.

## 2020-10-15 DIAGNOSIS — F41.9 ANXIETY: ICD-10-CM

## 2020-10-16 RX ORDER — DIAZEPAM 10 MG/1
TABLET ORAL
Qty: 90 TABLET | Refills: 0 | Status: SHIPPED | OUTPATIENT
Start: 2020-10-16 | End: 2020-11-18

## 2020-10-16 RX ORDER — DIAZEPAM 10 MG/1
TABLET ORAL
Qty: 90 TABLET | Refills: 0 | Status: SHIPPED | OUTPATIENT
Start: 2020-10-16 | End: 2020-12-02

## 2020-10-20 DIAGNOSIS — F51.04 CHRONIC INSOMNIA: ICD-10-CM

## 2020-10-20 DIAGNOSIS — A60.01 HERPES SIMPLEX INFECTION OF PENIS: ICD-10-CM

## 2020-10-20 RX ORDER — TRAZODONE HYDROCHLORIDE 100 MG/1
TABLET ORAL
Qty: 45 TABLET | Refills: 0 | Status: SHIPPED | OUTPATIENT
Start: 2020-10-20 | End: 2020-12-02

## 2020-10-20 RX ORDER — VALACYCLOVIR HYDROCHLORIDE 1 G/1
TABLET, FILM COATED ORAL
Qty: 30 TABLET | Refills: 0 | Status: SHIPPED | OUTPATIENT
Start: 2020-10-20 | End: 2022-07-27 | Stop reason: SDUPTHER

## 2020-11-16 DIAGNOSIS — F41.9 ANXIETY: ICD-10-CM

## 2020-11-18 RX ORDER — DIAZEPAM 10 MG/1
TABLET ORAL
Qty: 90 TABLET | Refills: 0 | Status: SHIPPED | OUTPATIENT
Start: 2020-11-18 | End: 2020-12-22 | Stop reason: SDUPTHER

## 2020-11-19 DIAGNOSIS — F32.A ANXIETY AND DEPRESSION: ICD-10-CM

## 2020-11-19 DIAGNOSIS — F41.9 ANXIETY AND DEPRESSION: ICD-10-CM

## 2020-11-19 RX ORDER — ESCITALOPRAM OXALATE 20 MG/1
TABLET ORAL
Qty: 30 TABLET | Refills: 5 | Status: SHIPPED | OUTPATIENT
Start: 2020-11-19 | End: 2021-06-24 | Stop reason: SDUPTHER

## 2020-12-02 ENCOUNTER — TELEPHONE (OUTPATIENT)
Dept: INTERNAL MEDICINE | Facility: CLINIC | Age: 55
End: 2020-12-02

## 2020-12-02 ENCOUNTER — OFFICE VISIT (OUTPATIENT)
Dept: INTERNAL MEDICINE | Facility: CLINIC | Age: 55
End: 2020-12-02

## 2020-12-02 DIAGNOSIS — R00.1 BRADYCARDIA: ICD-10-CM

## 2020-12-02 DIAGNOSIS — I10 BENIGN ESSENTIAL HTN: Primary | ICD-10-CM

## 2020-12-02 DIAGNOSIS — R21 RASH: ICD-10-CM

## 2020-12-02 DIAGNOSIS — F51.01 PRIMARY INSOMNIA: ICD-10-CM

## 2020-12-02 PROCEDURE — 99214 OFFICE O/P EST MOD 30 MIN: CPT | Performed by: INTERNAL MEDICINE

## 2020-12-02 RX ORDER — QUETIAPINE FUMARATE 25 MG/1
25-50 TABLET, FILM COATED ORAL NIGHTLY
Qty: 10 TABLET | Refills: 0 | Status: SHIPPED | OUTPATIENT
Start: 2020-12-02 | End: 2021-01-14

## 2020-12-02 RX ORDER — NEBIVOLOL 10 MG/1
5 TABLET ORAL DAILY
Qty: 90 TABLET | Refills: 0
Start: 2020-12-02 | End: 2021-01-08

## 2020-12-02 RX ORDER — CLINDAMYCIN PHOSPHATE 11.9 MG/ML
SOLUTION TOPICAL 2 TIMES DAILY
Qty: 60 ML | Refills: 2 | Status: SHIPPED | OUTPATIENT
Start: 2020-12-02 | End: 2021-02-23

## 2020-12-02 NOTE — PROGRESS NOTES
Subjective   Raj Kuhn is a 55 y.o. male seen via telephone visit for HTN, bradycardia, insomnia, rash. HTN: BP has been well-controlled recently. He has noticed HR going low during the night, into the 40s according to his smart watch. He had EKG recently and HR was ~58. He denies significant fatigue. Not sleeping well, however. He was on trazodone but it didn't help. He would like to try something else but not a controlled substance. Previously on ambien. Has a rash, saw derm in the past and would like RF of his clinda gel.        I have reviewed the following portions of the patient's history and confirmed they are accurate: current medications, past medical history, past social history and problem list     I have personally completed the patient's review of systems.    Review of Systems:  General: negative  CV: low HR  Neuro: negative  Psych: insomnia  Skin: rash    Objective   There were no vitals taken for this visit.  No VS done due to telephone visit.  Physical Exam  No PE done due to telephone visit    Assessment/Plan   Diagnoses and all orders for this visit:    1. Benign essential HTN (Primary)  -     nebivolol (Bystolic) 10 MG tablet; Take 0.5 tablets by mouth Daily. 200001  Dispense: 90 tablet; Refill: 0  -reduce dose due to bradycardia, pt to report if BP goes high and if soraida doesn't resolve    2. Bradycardia  -likely related to BB. Will reduce med as above    3. Primary insomnia  -     QUEtiapine (SEROquel) 25 MG tablet; Take 1-2 tablets by mouth Every Night.  Dispense: 10 tablet; Refill: 0  -new med, stop trazodone    4. Rash  -     clindamycin (Cleocin-T) 1 % external solution; Apply  topically to the appropriate area as directed 2 (Two) Times a Day.  Dispense: 60 mL; Refill: 2    This visit has been rescheduled as a phone visit to comply with patient safety concerns in accordance with CDC recommendations. Total time of discussion was 10 minutes.    You have chosen to receive care through a  telephone visit. Do you consent to use a telephone visit for your medical care today? Yes           Shabnam Zimmer MD    Please note that portions of this note were completed with a voice recognition program. Efforts were made to edit the dictations, but occasionally words are mistranscribed.

## 2020-12-02 NOTE — TELEPHONE ENCOUNTER
Patient called and said that he doesn't have a sore throat nor any respiratory issues, but he's having difficulty sleeping, pulse is low; he thinks its due to his medication, and he has a scratch on his leg that he wants dr morris to look at. Patient wanted to know if he has to come in office or can do a tele visit? Please give pt a call and advise

## 2020-12-22 DIAGNOSIS — F41.9 ANXIETY: ICD-10-CM

## 2020-12-22 RX ORDER — DIAZEPAM 10 MG/1
TABLET ORAL
Qty: 90 TABLET | Refills: 2 | Status: SHIPPED | OUTPATIENT
Start: 2020-12-22 | End: 2021-03-14 | Stop reason: SDUPTHER

## 2021-01-07 DIAGNOSIS — I10 BENIGN ESSENTIAL HTN: ICD-10-CM

## 2021-01-08 RX ORDER — NEBIVOLOL HYDROCHLORIDE 10 MG/1
TABLET ORAL
Qty: 90 TABLET | Refills: 3 | Status: SHIPPED | OUTPATIENT
Start: 2021-01-08 | End: 2021-06-24 | Stop reason: SDUPTHER

## 2021-01-13 DIAGNOSIS — F51.01 PRIMARY INSOMNIA: ICD-10-CM

## 2021-01-14 RX ORDER — QUETIAPINE FUMARATE 25 MG/1
TABLET, FILM COATED ORAL
Qty: 30 TABLET | Refills: 3 | Status: SHIPPED | OUTPATIENT
Start: 2021-01-14 | End: 2021-03-19

## 2021-02-03 RX ORDER — FAMOTIDINE 20 MG/1
20 TABLET, FILM COATED ORAL 2 TIMES DAILY
Qty: 60 TABLET | Refills: 5 | Status: SHIPPED | OUTPATIENT
Start: 2021-02-03 | End: 2021-06-24 | Stop reason: SDUPTHER

## 2021-02-23 DIAGNOSIS — R21 RASH: ICD-10-CM

## 2021-02-23 RX ORDER — CLINDAMYCIN PHOSPHATE 11.9 MG/ML
SOLUTION TOPICAL
Qty: 60 ML | Refills: 0 | Status: SHIPPED | OUTPATIENT
Start: 2021-02-23 | End: 2021-03-25

## 2021-02-23 NOTE — TELEPHONE ENCOUNTER
Last Office Visit: 12/02/2020  Next Office Visit: none scheduled     Labs completed in past 6 months? no  Labs completed in past year? no    Last Refill Date: 12/02/2020  Quantity: 60 mL  Refills: 2     Pharmacy:   [id number] - [place] - [address] - [phone]  - [phone]   Phone:  [phone]   Fax:  [phone]   Address:  [address] [place] [address]

## 2021-03-14 DIAGNOSIS — F41.9 ANXIETY: ICD-10-CM

## 2021-03-15 ENCOUNTER — APPOINTMENT (OUTPATIENT)
Dept: CT IMAGING | Facility: HOSPITAL | Age: 56
End: 2021-03-15

## 2021-03-15 ENCOUNTER — APPOINTMENT (OUTPATIENT)
Dept: GENERAL RADIOLOGY | Facility: HOSPITAL | Age: 56
End: 2021-03-15

## 2021-03-15 ENCOUNTER — HOSPITAL ENCOUNTER (EMERGENCY)
Facility: HOSPITAL | Age: 56
Discharge: HOME | End: 2021-03-15
Attending: EMERGENCY MEDICINE | Admitting: EMERGENCY MEDICINE

## 2021-03-15 VITALS
TEMPERATURE: 98.5 F | SYSTOLIC BLOOD PRESSURE: 129 MMHG | WEIGHT: 244.4 LBS | BODY MASS INDEX: 34.22 KG/M2 | OXYGEN SATURATION: 97 % | HEART RATE: 62 BPM | DIASTOLIC BLOOD PRESSURE: 78 MMHG | RESPIRATION RATE: 16 BRPM | HEIGHT: 71 IN

## 2021-03-15 DIAGNOSIS — M25.50 CHRONIC JOINT PAIN: ICD-10-CM

## 2021-03-15 DIAGNOSIS — T14.8XXA MUSCLE STRAIN: ICD-10-CM
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
## 2021-03-15 DIAGNOSIS — S79.912A HIP INJURY, LEFT, INITIAL ENCOUNTER: ICD-10-CM

## 2021-03-15 DIAGNOSIS — G89.29 CHRONIC JOINT PAIN: ICD-10-CM

## 2021-03-15 DIAGNOSIS — S89.91XA INJURY OF RIGHT KNEE, INITIAL ENCOUNTER: ICD-10-CM

## 2021-03-15 PROCEDURE — 72040 X-RAY EXAM NECK SPINE 2-3 VW: CPT

## 2021-03-15 PROCEDURE — 72192 CT PELVIS W/O DYE: CPT

## 2021-03-15 PROCEDURE — 73562 X-RAY EXAM OF KNEE 3: CPT

## 2021-03-15 PROCEDURE — 73502 X-RAY EXAM HIP UNI 2-3 VIEWS: CPT

## 2021-03-15 PROCEDURE — 73030 X-RAY EXAM OF SHOULDER: CPT

## 2021-03-15 PROCEDURE — 99282 EMERGENCY DEPT VISIT SF MDM: CPT

## 2021-03-15 PROCEDURE — 72100 X-RAY EXAM L-S SPINE 2/3 VWS: CPT

## 2021-03-15 PROCEDURE — 70450 CT HEAD/BRAIN W/O DYE: CPT

## 2021-03-15 RX ORDER — DIAZEPAM 10 MG/1
TABLET ORAL
Qty: 90 TABLET | Refills: 2 | Status: SHIPPED | OUTPATIENT
Start: 2021-03-15 | End: 2021-06-11 | Stop reason: SDUPTHER

## 2021-03-15 RX ORDER — CYCLOBENZAPRINE HCL 10 MG
10 TABLET ORAL 3 TIMES DAILY PRN
Qty: 21 TABLET | Refills: 0 | Status: SHIPPED | OUTPATIENT
Start: 2021-03-15 | End: 2021-03-19
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
## 2021-03-17 ENCOUNTER — TELEPHONE (OUTPATIENT)
Dept: INTERNAL MEDICINE | Facility: CLINIC | Age: 56
End: 2021-03-17
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
## 2021-03-19 ENCOUNTER — TELEPHONE (OUTPATIENT)
Dept: INTERNAL MEDICINE | Facility: CLINIC | Age: 56
End: 2021-03-19

## 2021-03-19 ENCOUNTER — OFFICE VISIT (OUTPATIENT)
Dept: INTERNAL MEDICINE | Facility: CLINIC | Age: 56
End: 2021-03-19

## 2021-03-19 ENCOUNTER — TREATMENT (OUTPATIENT)
Dept: INTERNAL MEDICINE | Facility: CLINIC | Age: 56
End: 2021-03-19

## 2021-03-19 VITALS
WEIGHT: 238 LBS | SYSTOLIC BLOOD PRESSURE: 132 MMHG | OXYGEN SATURATION: 94 % | DIASTOLIC BLOOD PRESSURE: 78 MMHG | HEART RATE: 57 BPM | HEIGHT: 71 IN | TEMPERATURE: 97.9 F | BODY MASS INDEX: 33.32 KG/M2 | RESPIRATION RATE: 18 BRPM

## 2021-03-19 DIAGNOSIS — F51.01 PRIMARY INSOMNIA: Primary | ICD-10-CM

## 2021-03-19 DIAGNOSIS — F51.01 PRIMARY INSOMNIA: ICD-10-CM

## 2021-03-19 DIAGNOSIS — M79.644 THUMB PAIN, RIGHT: ICD-10-CM

## 2021-03-19 DIAGNOSIS — M62.838 MUSCLE SPASM: ICD-10-CM
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
## 2021-03-19 DIAGNOSIS — M25.561 ACUTE PAIN OF RIGHT KNEE: ICD-10-CM

## 2021-03-19 PROCEDURE — 99214 OFFICE O/P EST MOD 30 MIN: CPT | Performed by: INTERNAL MEDICINE

## 2021-03-19 PROCEDURE — 96372 THER/PROPH/DIAG INJ SC/IM: CPT | Performed by: INTERNAL MEDICINE

## 2021-03-19 RX ORDER — METHOCARBAMOL 750 MG/1
750 TABLET, FILM COATED ORAL 4 TIMES DAILY PRN
Qty: 120 TABLET | Refills: 11 | Status: CANCELLED | OUTPATIENT
Start: 2021-03-19

## 2021-03-19 RX ORDER — METHOCARBAMOL 750 MG/1
750 TABLET, FILM COATED ORAL 4 TIMES DAILY PRN
Qty: 120 TABLET | Refills: 5 | Status: SHIPPED | OUTPATIENT
Start: 2021-03-19 | End: 2021-06-24 | Stop reason: SDUPTHER

## 2021-03-19 RX ORDER — ZOLPIDEM TARTRATE 5 MG/1
5 TABLET ORAL NIGHTLY PRN
Qty: 30 TABLET | Refills: 2 | Status: CANCELLED | OUTPATIENT
Start: 2021-03-19

## 2021-03-19 RX ORDER — ZOLPIDEM TARTRATE 5 MG/1
5 TABLET ORAL NIGHTLY PRN
Qty: 30 TABLET | Refills: 2 | Status: SHIPPED | OUTPATIENT
Start: 2021-03-19 | End: 2021-07-21 | Stop reason: SDUPTHER

## 2021-03-19 RX ORDER — KETOROLAC TROMETHAMINE 30 MG/ML
30 INJECTION, SOLUTION INTRAMUSCULAR; INTRAVENOUS ONCE
Status: COMPLETED | OUTPATIENT
Start: 2021-03-19 | End: 2021-03-19

## 2021-03-19 RX ADMIN — KETOROLAC TROMETHAMINE 30 MG: 30 INJECTION, SOLUTION INTRAMUSCULAR; INTRAVENOUS at 13:39

## 2021-03-19 NOTE — TELEPHONE ENCOUNTER
Patient asked if you could do a referral to Urologist and that he had some dental work done last week screws put in and it causes him to have a sinus infection and wanted to know if an antibiotic could be sent in.

## 2021-03-19 NOTE — TELEPHONE ENCOUNTER
I had to remove the Robaxin and Zolpidem you had pended to sign the Toradol injection order... sorry!!
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
## 2021-03-23 DIAGNOSIS — G89.29 CHRONIC BILATERAL LOW BACK PAIN WITHOUT SCIATICA: Primary | ICD-10-CM

## 2021-03-23 DIAGNOSIS — M54.50 CHRONIC BILATERAL LOW BACK PAIN WITHOUT SCIATICA: Primary | ICD-10-CM

## 2021-03-25 DIAGNOSIS — F51.01 PRIMARY INSOMNIA: ICD-10-CM

## 2021-03-25 DIAGNOSIS — R21 RASH: ICD-10-CM

## 2021-03-25 RX ORDER — CLINDAMYCIN PHOSPHATE 11.9 MG/ML
SOLUTION TOPICAL
Qty: 60 ML | Refills: 0 | Status: SHIPPED | OUTPATIENT
Start: 2021-03-25 | End: 2021-04-26

## 2021-03-25 RX ORDER — QUETIAPINE FUMARATE 25 MG/1
TABLET, FILM COATED ORAL
Qty: 30 TABLET | Refills: 0 | Status: SHIPPED | OUTPATIENT
Start: 2021-03-25 | End: 2021-04-26

## 2021-03-30 ENCOUNTER — HOSPITAL ENCOUNTER (OUTPATIENT)
Dept: GENERAL RADIOLOGY | Facility: HOSPITAL | Age: 56
Discharge: HOME | End: 2021-03-30
Admitting: INTERNAL MEDICINE

## 2021-03-30 PROCEDURE — 73130 X-RAY EXAM OF HAND: CPT

## 2021-04-01 ENCOUNTER — TELEPHONE (OUTPATIENT)
Dept: INTERNAL MEDICINE | Facility: CLINIC | Age: 56
End: 2021-04-01

## 2021-04-01 DIAGNOSIS — N48.9 PENILE LESION: Primary | ICD-10-CM

## 2021-04-01 NOTE — TELEPHONE ENCOUNTER
Pt is calling about getting a referral to neurology and urology. He said he thought he was getting a referral to neurology but I didn't see anything. I told him we only had ortho surgery. He said he had mention some issues before about a lump and wanted to get a referral to urology. Please call pt and advise.

## 2021-04-01 NOTE — TELEPHONE ENCOUNTER
I just put in for urology. I think the neuro referral he is asking about is for his back. Am I right? That one is going to be difficult without copies of imaging for back, does he know if he has had any and where would those records be?

## 2021-04-19 DIAGNOSIS — M79.644 THUMB PAIN, RIGHT: Primary | ICD-10-CM

## 2021-04-19 DIAGNOSIS — G89.29 CHRONIC BILATERAL LOW BACK PAIN WITHOUT SCIATICA: ICD-10-CM

## 2021-04-19 DIAGNOSIS — M54.50 CHRONIC BILATERAL LOW BACK PAIN WITHOUT SCIATICA: ICD-10-CM

## 2021-04-24 DIAGNOSIS — R21 RASH: ICD-10-CM

## 2021-04-24 DIAGNOSIS — F51.01 PRIMARY INSOMNIA: ICD-10-CM

## 2021-04-26 RX ORDER — CLINDAMYCIN PHOSPHATE 11.9 MG/ML
SOLUTION TOPICAL
Qty: 60 ML | Refills: 3 | Status: SHIPPED | OUTPATIENT
Start: 2021-04-26

## 2021-04-26 RX ORDER — QUETIAPINE FUMARATE 25 MG/1
TABLET, FILM COATED ORAL
Qty: 30 TABLET | Refills: 3 | Status: SHIPPED | OUTPATIENT
Start: 2021-04-26 | End: 2021-06-24 | Stop reason: SDUPTHER

## 2021-05-11 ENCOUNTER — OFFICE VISIT (OUTPATIENT)
Dept: INTERNAL MEDICINE | Facility: CLINIC | Age: 56
End: 2021-05-11

## 2021-05-11 VITALS
HEIGHT: 71 IN | SYSTOLIC BLOOD PRESSURE: 138 MMHG | HEART RATE: 73 BPM | RESPIRATION RATE: 18 BRPM | WEIGHT: 243 LBS | BODY MASS INDEX: 34.02 KG/M2 | OXYGEN SATURATION: 97 % | DIASTOLIC BLOOD PRESSURE: 96 MMHG | TEMPERATURE: 97.2 F

## 2021-05-11 DIAGNOSIS — M25.552 LEFT HIP PAIN: ICD-10-CM

## 2021-05-11 DIAGNOSIS — J01.80 ACUTE NON-RECURRENT SINUSITIS OF OTHER SINUS: Primary | ICD-10-CM

## 2021-05-11 PROCEDURE — 99214 OFFICE O/P EST MOD 30 MIN: CPT | Performed by: NURSE PRACTITIONER

## 2021-05-11 PROCEDURE — 96372 THER/PROPH/DIAG INJ SC/IM: CPT | Performed by: NURSE PRACTITIONER

## 2021-05-11 RX ORDER — KETOROLAC TROMETHAMINE 30 MG/ML
60 INJECTION, SOLUTION INTRAMUSCULAR; INTRAVENOUS ONCE
Status: COMPLETED | OUTPATIENT
Start: 2021-05-11 | End: 2021-05-11

## 2021-05-11 RX ORDER — AZITHROMYCIN 250 MG/1
TABLET, FILM COATED ORAL
Qty: 6 TABLET | Refills: 0 | Status: SHIPPED | OUTPATIENT
Start: 2021-05-11 | End: 2021-05-17

## 2021-05-11 RX ORDER — OXYCODONE AND ACETAMINOPHEN 10; 325 MG/1; MG/1
1 TABLET ORAL 3 TIMES DAILY
COMMUNITY
Start: 2021-05-05

## 2021-05-11 RX ADMIN — KETOROLAC TROMETHAMINE 60 MG: 30 INJECTION, SOLUTION INTRAMUSCULAR; INTRAVENOUS at 12:42
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
## 2021-05-12 RX ORDER — PROMETHAZINE HYDROCHLORIDE 25 MG/1
TABLET ORAL
Qty: 42 TABLET | Refills: 2 | Status: SHIPPED | OUTPATIENT
Start: 2021-05-12 | End: 2021-12-16

## 2021-05-17 ENCOUNTER — OFFICE VISIT (OUTPATIENT)
Dept: INTERNAL MEDICINE | Facility: CLINIC | Age: 56
End: 2021-05-17

## 2021-05-17 VITALS
TEMPERATURE: 98.2 F | DIASTOLIC BLOOD PRESSURE: 78 MMHG | SYSTOLIC BLOOD PRESSURE: 124 MMHG | HEIGHT: 70 IN | BODY MASS INDEX: 34.13 KG/M2 | WEIGHT: 238.4 LBS

## 2021-05-17 DIAGNOSIS — F41.9 ANXIETY: ICD-10-CM

## 2021-05-17 DIAGNOSIS — N48.9 PENILE LESION: Primary | ICD-10-CM

## 2021-05-17 PROCEDURE — 99214 OFFICE O/P EST MOD 30 MIN: CPT | Performed by: INTERNAL MEDICINE
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
## 2021-05-24 DIAGNOSIS — I10 BENIGN ESSENTIAL HTN: ICD-10-CM

## 2021-05-24 DIAGNOSIS — N48.9 PENILE LESION: Primary | ICD-10-CM

## 2021-05-24 RX ORDER — LOSARTAN POTASSIUM 100 MG/1
TABLET ORAL
Qty: 90 TABLET | Refills: 2 | Status: SHIPPED | OUTPATIENT
Start: 2021-05-24 | End: 2021-06-24 | Stop reason: SDUPTHER

## 2021-06-07 ENCOUNTER — OFFICE VISIT (OUTPATIENT)
Dept: INTERNAL MEDICINE | Facility: CLINIC | Age: 56
End: 2021-06-07

## 2021-06-07 ENCOUNTER — HOSPITAL ENCOUNTER (OUTPATIENT)
Dept: ULTRASOUND IMAGING | Facility: HOSPITAL | Age: 56
Discharge: HOME | End: 2021-06-07
Admitting: INTERNAL MEDICINE

## 2021-06-07 VITALS
WEIGHT: 247 LBS | SYSTOLIC BLOOD PRESSURE: 126 MMHG | TEMPERATURE: 97.1 F | OXYGEN SATURATION: 94 % | DIASTOLIC BLOOD PRESSURE: 78 MMHG | HEART RATE: 56 BPM | RESPIRATION RATE: 16 BRPM | BODY MASS INDEX: 35.36 KG/M2 | HEIGHT: 70 IN

## 2021-06-07 DIAGNOSIS — N48.9 PENILE LESION: ICD-10-CM

## 2021-06-07 DIAGNOSIS — J01.90 ACUTE NON-RECURRENT SINUSITIS, UNSPECIFIED LOCATION: Primary | ICD-10-CM

## 2021-06-07 PROCEDURE — 99214 OFFICE O/P EST MOD 30 MIN: CPT | Performed by: INTERNAL MEDICINE

## 2021-06-07 PROCEDURE — 76870 US EXAM SCROTUM: CPT

## 2021-06-07 RX ORDER — FLUTICASONE PROPIONATE 50 MCG
2 SPRAY, SUSPENSION (ML) NASAL DAILY
Qty: 11.1 ML | Refills: 0 | Status: SHIPPED | OUTPATIENT
Start: 2021-06-07 | End: 2022-07-12

## 2021-06-07 RX ORDER — AMOXICILLIN AND CLAVULANATE POTASSIUM 875; 125 MG/1; MG/1
1 TABLET, FILM COATED ORAL 2 TIMES DAILY
Qty: 20 TABLET | Refills: 0 | Status: SHIPPED | OUTPATIENT
Start: 2021-06-07 | End: 2021-06-17

## 2021-06-07 NOTE — PROGRESS NOTES
"     Office Note      Date: 2021  Patient Name: Raj Kuhn  MRN: [id number]  : 1965    Chief Complaint   Patient presents with   • Headache   • Sinusitis     x 3 days       History of Present Illness: Raj Kuhn is a 55 y.o. male who presents for Headache and Sinusitis (x 3 days). Has sore throat and drainage. Has cough w/ green sputum production. No fever. No facial pressure, has ear pressure.  Has not tried anything for symptoms.    Subjective      Review of Systems:   Pertinent review of systems per HPI.    Review of Systems   Constitutional: Negative for activity change, appetite change, chills, diaphoresis and fatigue.   HENT: Positive for postnasal drip and sore throat. Negative for congestion, dental problem, drooling, ear discharge, ear pain, facial swelling and hearing loss.    Eyes: Negative for photophobia, pain, discharge, redness, itching and visual disturbance.   Respiratory: Positive for cough. Negative for choking, chest tightness and shortness of breath.    Cardiovascular: Negative for chest pain, palpitations and leg swelling.   Endocrine: Negative for cold intolerance, heat intolerance, polydipsia and polyuria.   Genitourinary: Negative for difficulty urinating, dysuria, flank pain, frequency and hematuria.   Musculoskeletal: Negative for arthralgias and back pain.   Skin: Negative for color change, pallor, rash and wound.   Allergic/Immunologic: Negative for environmental allergies.   Neurological: Negative for dizziness, facial asymmetry, light-headedness and headaches.   Psychiatric/Behavioral: Negative.    All other systems reviewed and are negative.    Allergies   Allergen Reactions   • Prednisone Mental Status Change       Objective     Physical Exam:  Vital Signs:   Vitals:    21 1120   BP: 126/78   Pulse: 56   Resp: 16   Temp: 97.1 °F (36.2 °C)   TempSrc: Temporal   SpO2: 94%   Weight: 112 kg (247 lb)   Height: 177.8 cm (70\")      Body mass index is 35.44 " kg/m².    Physical Exam  Vitals and nursing note reviewed.   Constitutional:       General: He is not in acute distress.     Appearance: He is well-developed.   HENT:      Head: Normocephalic and atraumatic.      Right Ear: External ear normal.      Left Ear: External ear normal.      Mouth/Throat:      Mouth: Mucous membranes are moist.      Pharynx: Oropharynx is clear.      Comments: Postnasal drip    Eyes:      General: No scleral icterus.        Right eye: No discharge.         Left eye: No discharge.      Conjunctiva/sclera: Conjunctivae normal.   Cardiovascular:      Rate and Rhythm: Normal rate and regular rhythm.      Heart sounds: Normal heart sounds. No murmur heard.   No friction rub. No gallop.    Pulmonary:      Effort: Pulmonary effort is normal. No respiratory distress.      Breath sounds: Normal breath sounds. No wheezing or rales.   Lymphadenopathy:      Cervical: No cervical adenopathy.   Skin:     General: Skin is warm and dry.      Coloration: Skin is not pale.         Assessment / Plan      Assessment & Plan:    1. Acute non-recurrent sinusitis, unspecified location  Rx for Augmentin twice daily x10 days.  Rx for Flonase.  Salt water gargles for sore throat, Tylenol for fever or body aches      Nely Bal MD  06/07/2021     Please note that portions of this note may have been completed with a voice recognition program. Efforts were made to edit the dictations, but occasionally words are mistranscribed.

## 2021-06-09 ENCOUNTER — TELEPHONE (OUTPATIENT)
Dept: INTERNAL MEDICINE | Facility: CLINIC | Age: 56
End: 2021-06-09

## 2021-06-09 ENCOUNTER — OFFICE VISIT (OUTPATIENT)
Dept: NEUROSURGERY | Facility: CLINIC | Age: 56
End: 2021-06-09

## 2021-06-09 VITALS
HEIGHT: 71 IN | DIASTOLIC BLOOD PRESSURE: 82 MMHG | TEMPERATURE: 97.6 F | BODY MASS INDEX: 34.27 KG/M2 | WEIGHT: 244.8 LBS | SYSTOLIC BLOOD PRESSURE: 142 MMHG

## 2021-06-09 DIAGNOSIS — G89.29 CHRONIC BILATERAL LOW BACK PAIN WITHOUT SCIATICA: Primary | ICD-10-CM

## 2021-06-09 DIAGNOSIS — M51.36 DEGENERATIVE DISC DISEASE, LUMBAR: ICD-10-CM

## 2021-06-09 DIAGNOSIS — M54.50 CHRONIC BILATERAL LOW BACK PAIN WITHOUT SCIATICA: Primary | ICD-10-CM

## 2021-06-09 DIAGNOSIS — Z98.1 HISTORY OF FUSION OF CERVICAL SPINE: ICD-10-CM

## 2021-06-09 DIAGNOSIS — M54.59 MECHANICAL LOW BACK PAIN: Primary | ICD-10-CM

## 2021-06-09 DIAGNOSIS — G56.03 BILATERAL CARPAL TUNNEL SYNDROME: ICD-10-CM

## 2021-06-09 DIAGNOSIS — M48.061 SPINAL STENOSIS, LUMBAR REGION, WITHOUT NEUROGENIC CLAUDICATION: ICD-10-CM

## 2021-06-09 DIAGNOSIS — E66.9 OBESITY (BMI 30-39.9): ICD-10-CM

## 2021-06-09 PROCEDURE — 99203 OFFICE O/P NEW LOW 30 MIN: CPT | Performed by: PHYSICIAN ASSISTANT

## 2021-06-09 RX ORDER — ASPIRIN 81 MG/1
81 TABLET, CHEWABLE ORAL DAILY
COMMUNITY

## 2021-06-09 NOTE — TELEPHONE ENCOUNTER
PATIENT CALLED AND IS CURRENTLY AT HIS APPOINTMENT BUT IS VERY UNHAPP WITH HIS [facility] [place] [facility]. PATIENT STATED HE WOULD LIKE TO LEAVE THIS APPOINTMENT AND BE REFERRED TO [facility]. PLEASE RETURN CALL TO PATIENT [phone] TO ADVISE. PATIENT REPORTS HE DOESN'T FEEL COMFORTABLE AT THE APPOINTMENT
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
## 2021-06-09 NOTE — TELEPHONE ENCOUNTER
LVM to inform the patient that a new referral was placed for him and office number provided in case of any questions

## 2021-06-09 NOTE — PATIENT INSTRUCTIONS

## 2021-06-10 ENCOUNTER — TELEPHONE (OUTPATIENT)
Dept: INTERNAL MEDICINE | Facility: CLINIC | Age: 56
End: 2021-06-10

## 2021-06-10 NOTE — TELEPHONE ENCOUNTER
----- Message from Shabnam Zimmer MD sent at 6/7/2021  4:18 PM EDT -----  Please call the patient regarding his abnormal result.  Tell him the ultrasound showed a 5 mm calcification in the area of interest.  There was no other abnormalities.  It could be a cyst that is calcified.  Ultimately he will have to follow-up with urology to see if it is of any concern.

## 2021-06-11 DIAGNOSIS — F41.9 ANXIETY: ICD-10-CM

## 2021-06-11 RX ORDER — DIAZEPAM 10 MG/1
TABLET ORAL
Qty: 90 TABLET | Refills: 2 | Status: SHIPPED | OUTPATIENT
Start: 2021-06-11 | End: 2021-07-21 | Stop reason: SDUPTHER

## 2021-06-14 RX ORDER — DIAZEPAM 10 MG/1
TABLET ORAL
Qty: 90 TABLET | Refills: 2 | Status: SHIPPED | OUTPATIENT
Start: 2021-06-14 | End: 2021-09-07

## 2021-06-22 ENCOUNTER — TELEPHONE (OUTPATIENT)
Dept: NEUROSURGERY | Facility: CLINIC | Age: 56
End: 2021-06-22

## 2021-06-22 DIAGNOSIS — M48.061 SPINAL STENOSIS, LUMBAR REGION, WITHOUT NEUROGENIC CLAUDICATION: Primary | ICD-10-CM

## 2021-06-22 NOTE — TELEPHONE ENCOUNTER
I signed an order for an MRI of his back.   - if that is negative I will order his hip xray in FU

## 2021-06-22 NOTE — TELEPHONE ENCOUNTER
Caller: Raj Kuhn    Relationship: Self    Best call back number: [phone]    What orders are you requesting (i.e. lab or imaging): MRI LOW BACK AND HIP    In what timeframe would the patient need to come in: ASAP    Where will you receive your lab/imaging services: [facility]    Additional notes: PT STATES THAT HIS [facility] IS NOT ORDERING THE MRI FOR HIM. HE HAS CALLED THEM 2 TIMES AND HE IS IN A LOT OF PAIN. HE WOULD LIKE FOR US TO WRITE THE ORDERS FOR HIS MRI'S  PLEASE ADVISE  THANK YOU

## 2021-06-24 DIAGNOSIS — F41.9 ANXIETY AND DEPRESSION: ICD-10-CM

## 2021-06-24 DIAGNOSIS — I10 BENIGN ESSENTIAL HTN: ICD-10-CM

## 2021-06-24 DIAGNOSIS — F32.A ANXIETY AND DEPRESSION: ICD-10-CM

## 2021-06-24 DIAGNOSIS — F51.01 PRIMARY INSOMNIA: ICD-10-CM

## 2021-06-24 RX ORDER — ESCITALOPRAM OXALATE 20 MG/1
20 TABLET ORAL DAILY
Qty: 90 TABLET | Refills: 3 | Status: SHIPPED | OUTPATIENT
Start: 2021-06-24 | End: 2022-02-23 | Stop reason: SDUPTHER

## 2021-06-24 RX ORDER — NEBIVOLOL 10 MG/1
10 TABLET ORAL DAILY
Qty: 10 TABLET | Refills: 0 | Status: SHIPPED | OUTPATIENT
Start: 2021-06-24 | End: 2021-06-24 | Stop reason: SDUPTHER

## 2021-06-24 RX ORDER — FAMOTIDINE 20 MG/1
20 TABLET, FILM COATED ORAL 2 TIMES DAILY
Qty: 180 TABLET | Refills: 3 | Status: SHIPPED | OUTPATIENT
Start: 2021-06-24

## 2021-06-24 RX ORDER — NEBIVOLOL 10 MG/1
10 TABLET ORAL DAILY
Qty: 90 TABLET | Refills: 3 | Status: SHIPPED | OUTPATIENT
Start: 2021-06-24 | End: 2021-08-30 | Stop reason: SDUPTHER

## 2021-06-24 RX ORDER — QUETIAPINE FUMARATE 25 MG/1
25 TABLET, FILM COATED ORAL DAILY
Qty: 90 TABLET | Refills: 3 | Status: SHIPPED | OUTPATIENT
Start: 2021-06-24 | End: 2022-02-23 | Stop reason: SDUPTHER

## 2021-06-24 RX ORDER — LOSARTAN POTASSIUM 100 MG/1
100 TABLET ORAL DAILY
Qty: 90 TABLET | Refills: 3 | Status: SHIPPED | OUTPATIENT
Start: 2021-06-24 | End: 2021-08-30 | Stop reason: SDUPTHER

## 2021-06-24 RX ORDER — METHOCARBAMOL 750 MG/1
750 TABLET, FILM COATED ORAL 4 TIMES DAILY PRN
Qty: 360 TABLET | Refills: 3 | Status: SHIPPED | OUTPATIENT
Start: 2021-06-24 | End: 2023-02-28 | Stop reason: SDUPTHER

## 2021-06-24 RX ORDER — LOSARTAN POTASSIUM 100 MG/1
100 TABLET ORAL DAILY
Qty: 3 TABLET | Refills: 0 | Status: SHIPPED | OUTPATIENT
Start: 2021-06-24 | End: 2021-06-24 | Stop reason: SDUPTHER

## 2021-06-24 RX ORDER — HYDROCHLOROTHIAZIDE 25 MG/1
12.5 TABLET ORAL DAILY
Qty: 90 TABLET | Refills: 2
Start: 2021-06-24

## 2021-07-07 ENCOUNTER — LAB (OUTPATIENT)
Dept: LAB | Facility: HOSPITAL | Age: 56
End: 2021-07-07

## 2021-07-07 ENCOUNTER — OFFICE VISIT (OUTPATIENT)
Dept: INTERNAL MEDICINE | Facility: CLINIC | Age: 56
End: 2021-07-07

## 2021-07-07 VITALS
RESPIRATION RATE: 20 BRPM | HEIGHT: 71 IN | HEART RATE: 73 BPM | DIASTOLIC BLOOD PRESSURE: 72 MMHG | SYSTOLIC BLOOD PRESSURE: 118 MMHG | WEIGHT: 238 LBS | BODY MASS INDEX: 33.32 KG/M2 | TEMPERATURE: 97.5 F | OXYGEN SATURATION: 96 %

## 2021-07-07 DIAGNOSIS — R19.7 DIARRHEA, UNSPECIFIED TYPE: ICD-10-CM

## 2021-07-07 DIAGNOSIS — R14.0 ABDOMINAL DISTENTION: ICD-10-CM

## 2021-07-07 DIAGNOSIS — R11.0 NAUSEA: ICD-10-CM

## 2021-07-07 DIAGNOSIS — R10.32 ACUTE LEFT LOWER QUADRANT PAIN: Primary | ICD-10-CM

## 2021-07-07 LAB
ALBUMIN SERPL-MCNC: 4.6 G/DL (ref 3.5–5.2)
ALBUMIN/GLOB SERPL: 1.4 G/DL
ALP SERPL-CCNC: 62 U/L (ref 39–117)
ALT SERPL W P-5'-P-CCNC: 15 U/L (ref 1–41)
AMYLASE SERPL-CCNC: 12 U/L (ref 28–100)
ANION GAP SERPL CALCULATED.3IONS-SCNC: 12 MMOL/L (ref 5–15)
AST SERPL-CCNC: 18 U/L (ref 1–40)
BASOPHILS # BLD AUTO: 0.06 10*3/MM3 (ref 0–0.2)
BASOPHILS NFR BLD AUTO: 0.5 % (ref 0–1.5)
BILIRUB SERPL-MCNC: 0.8 MG/DL (ref 0–1.2)
BUN SERPL-MCNC: 19 MG/DL (ref 6–20)
BUN/CREAT SERPL: 13.9 (ref 7–25)
CALCIUM SPEC-SCNC: 10.1 MG/DL (ref 8.6–10.5)
CHLORIDE SERPL-SCNC: 98 MMOL/L (ref 98–107)
CO2 SERPL-SCNC: 26 MMOL/L (ref 22–29)
CREAT SERPL-MCNC: 1.37 MG/DL (ref 0.76–1.27)
DEPRECATED RDW RBC AUTO: 42.6 FL (ref 37–54)
EOSINOPHIL # BLD AUTO: 0.16 10*3/MM3 (ref 0–0.4)
EOSINOPHIL NFR BLD AUTO: 1.4 % (ref 0.3–6.2)
ERYTHROCYTE [DISTWIDTH] IN BLOOD BY AUTOMATED COUNT: 12.8 % (ref 12.3–15.4)
GFR SERPL CREATININE-BSD FRML MDRD: 54 ML/MIN/1.73
GLOBULIN UR ELPH-MCNC: 3.2 GM/DL
GLUCOSE SERPL-MCNC: 88 MG/DL (ref 65–99)
HCT VFR BLD AUTO: 57.3 % (ref 37.5–51)
HGB BLD-MCNC: 19.5 G/DL (ref 13–17.7)
IMM GRANULOCYTES # BLD AUTO: 0.05 10*3/MM3 (ref 0–0.05)
IMM GRANULOCYTES NFR BLD AUTO: 0.4 % (ref 0–0.5)
LIPASE SERPL-CCNC: 14 U/L (ref 13–60)
LYMPHOCYTES # BLD AUTO: 2.24 10*3/MM3 (ref 0.7–3.1)
LYMPHOCYTES NFR BLD AUTO: 19.2 % (ref 19.6–45.3)
MCH RBC QN AUTO: 31.5 PG (ref 26.6–33)
MCHC RBC AUTO-ENTMCNC: 34 G/DL (ref 31.5–35.7)
MCV RBC AUTO: 92.6 FL (ref 79–97)
MONOCYTES # BLD AUTO: 1.28 10*3/MM3 (ref 0.1–0.9)
MONOCYTES NFR BLD AUTO: 11 % (ref 5–12)
NEUTROPHILS NFR BLD AUTO: 67.5 % (ref 42.7–76)
NEUTROPHILS NFR BLD AUTO: 7.89 10*3/MM3 (ref 1.7–7)
NRBC BLD AUTO-RTO: 0.1 /100 WBC (ref 0–0.2)
PLATELET # BLD AUTO: 220 10*3/MM3 (ref 140–450)
PMV BLD AUTO: 10.1 FL (ref 6–12)
POTASSIUM SERPL-SCNC: 4.2 MMOL/L (ref 3.5–5.2)
PROT SERPL-MCNC: 7.8 G/DL (ref 6–8.5)
RBC # BLD AUTO: 6.19 10*6/MM3 (ref 4.14–5.8)
SODIUM SERPL-SCNC: 136 MMOL/L (ref 136–145)
WBC # BLD AUTO: 11.68 10*3/MM3 (ref 3.4–10.8)

## 2021-07-07 PROCEDURE — 36415 COLL VENOUS BLD VENIPUNCTURE: CPT

## 2021-07-07 PROCEDURE — 80053 COMPREHEN METABOLIC PANEL: CPT | Performed by: NURSE PRACTITIONER

## 2021-07-07 PROCEDURE — 82150 ASSAY OF AMYLASE: CPT

## 2021-07-07 PROCEDURE — 83690 ASSAY OF LIPASE: CPT

## 2021-07-07 PROCEDURE — 99214 OFFICE O/P EST MOD 30 MIN: CPT | Performed by: NURSE PRACTITIONER

## 2021-07-07 PROCEDURE — 85025 COMPLETE CBC W/AUTO DIFF WBC: CPT

## 2021-07-07 RX ORDER — ONDANSETRON 8 MG/1
TABLET, ORALLY DISINTEGRATING ORAL
Qty: 30 TABLET | Refills: 1 | Status: SHIPPED | OUTPATIENT
Start: 2021-07-07 | End: 2021-10-05 | Stop reason: SDUPTHER

## 2021-07-07 NOTE — PROGRESS NOTES
"Chief Complaint   Patient presents with   • Diarrhea   • Abdominal Pain       HPI  Raj Kuhn is a 55 y.o. male presents with abdominal pain and diarrhea.  This started 2 days ago.  Associated symptoms include abdominal bloating.  He states the bloating intensified his abdominal pain.  He states he hasn't had any diarrhea since 0400 this am.  He states he's starting to hurt again in his lower abdomen because he drank some sprite earlier this morning.  The pain does not radiate to his back.  History of a hernia.  He has had a \"mild\" amount of heartburn which is relieved by Pepcid.  Had an episode of pain so bad he almost went to the ER 2 days ago but he didn't.    The following portions of the patient's history were reviewed and updated as appropriate: allergies, current medications, past family history, past medical history, past social history, past surgical history and problem list.    Subjective  Review of Systems   Constitutional: Negative for activity change, appetite change and fatigue.   HENT: Negative for congestion.    Respiratory: Negative for cough and shortness of breath.    Cardiovascular: Negative for chest pain and leg swelling.   Gastrointestinal: Positive for abdominal distention, abdominal pain, diarrhea and nausea. Negative for blood in stool and vomiting.   Neurological: Negative for dizziness, weakness and confusion.   Psychiatric/Behavioral: Negative for behavioral problems and decreased concentration.       Objective  Visit Vitals  /72   Pulse 73   Temp 97.5 °F (36.4 °C) (Temporal)   Resp 20   Ht 180.3 cm (71\")   Wt 108 kg (238 lb)   SpO2 96%   BMI 33.19 kg/m²        Physical Exam  Vitals and nursing note reviewed.   HENT:      Head: Normocephalic.   Eyes:      Pupils: Pupils are equal, round, and reactive to light.   Cardiovascular:      Rate and Rhythm: Normal rate and regular rhythm.      Pulses: Normal pulses.      Heart sounds: Normal heart sounds.   Pulmonary:      Effort: " Pulmonary effort is normal.      Breath sounds: Normal breath sounds.   Abdominal:      General: Bowel sounds are increased. There is distension.      Tenderness: There is abdominal tenderness (LLQ). There is no guarding or rebound.      Comments: Abdomen firm   Skin:     General: Skin is warm and dry.      Capillary Refill: Capillary refill takes less than 2 seconds.   Neurological:      General: No focal deficit present.      Mental Status: He is alert and oriented to person, place, and time.   Psychiatric:         Mood and Affect: Mood normal.         Behavior: Behavior normal.         Thought Content: Thought content normal.          Procedures     Assessment and Plan  Diagnoses and all orders for this visit:    1. Acute left lower quadrant pain (Primary)  -     CT Abdomen Pelvis With Contrast; Future    2. Diarrhea, unspecified type    3. Abdominal distention  -     CBC w AUTO Differential; Future  -     Comprehensive Metabolic Panel  -     Lipase; Future  -     Amylase; Future    4. Nausea  -     ondansetron ODT (ZOFRAN-ODT) 8 MG disintegrating tablet; Take 1/2 to 1 tablet by mouth (dissolve under tongue or swallow) every 8 hours as needed for nausea.  Dispense: 30 tablet; Refill: 1    labs today  STAT CT scan r/o diverticulitis  abd firm/distended  Zofran prn N/V  Instructed to ER for worsening pain    Return in about 2 weeks (around 7/21/2021).        JOSE Kumar

## 2021-07-08 ENCOUNTER — TELEPHONE (OUTPATIENT)
Dept: INTERNAL MEDICINE | Facility: CLINIC | Age: 56
End: 2021-07-08

## 2021-07-08 NOTE — TELEPHONE ENCOUNTER
DOMINIQUE FROM [facility] CALLED NEEDING PATIENTS CT ORDER AND LAB WORK FAXED OVER TO HER [phone]

## 2021-07-10 ENCOUNTER — PATIENT MESSAGE (OUTPATIENT)
Dept: INTERNAL MEDICINE | Facility: CLINIC | Age: 56
End: 2021-07-10

## 2021-07-12 ENCOUNTER — TELEPHONE (OUTPATIENT)
Dept: INTERNAL MEDICINE | Facility: CLINIC | Age: 56
End: 2021-07-12

## 2021-07-12 NOTE — TELEPHONE ENCOUNTER
Did [place] reply to him on that? She saw him so I'm not totally up to speed on what's going on with him. I looked at the CT, looked like a few things going on. He does need to see gen surg for the gallbladder.

## 2021-07-13 ENCOUNTER — LAB (OUTPATIENT)
Dept: LAB | Facility: HOSPITAL | Age: 56
End: 2021-07-13

## 2021-07-13 DIAGNOSIS — E66.01 MORBIDLY OBESE (HCC): ICD-10-CM

## 2021-07-13 DIAGNOSIS — K80.20 GALLSTONES: Primary | ICD-10-CM

## 2021-07-13 DIAGNOSIS — I10 HYPERTENSION, UNSPECIFIED TYPE: ICD-10-CM

## 2021-07-13 DIAGNOSIS — F41.9 ANXIETY AND DEPRESSION: ICD-10-CM

## 2021-07-13 DIAGNOSIS — F32.A ANXIETY AND DEPRESSION: ICD-10-CM

## 2021-07-13 DIAGNOSIS — K21.9 GASTROESOPHAGEAL REFLUX DISEASE WITHOUT ESOPHAGITIS: ICD-10-CM

## 2021-07-13 DIAGNOSIS — R93.5 ABNORMAL CT OF THE ABDOMEN: Primary | ICD-10-CM

## 2021-07-13 DIAGNOSIS — G89.29 CHRONIC JOINT PAIN: ICD-10-CM

## 2021-07-13 DIAGNOSIS — I10 BENIGN ESSENTIAL HTN: Primary | ICD-10-CM

## 2021-07-13 DIAGNOSIS — M25.50 CHRONIC JOINT PAIN: ICD-10-CM

## 2021-07-13 DIAGNOSIS — F41.0 PANIC ATTACK: ICD-10-CM

## 2021-07-13 DIAGNOSIS — R14.0 ABDOMINAL DISTENTION: ICD-10-CM

## 2021-07-13 DIAGNOSIS — R93.5 ABNORMAL CT OF THE ABDOMEN: ICD-10-CM

## 2021-07-13 LAB
D DIMER PPP FEU-MCNC: 1.06 MCGFEU/ML (ref 0–0.56)
INR PPP: 1.06 (ref 0.85–1.16)
PROTHROMBIN TIME: 13.4 SECONDS (ref 11.4–14.4)

## 2021-07-13 PROCEDURE — 36415 COLL VENOUS BLD VENIPUNCTURE: CPT

## 2021-07-13 PROCEDURE — 85379 FIBRIN DEGRADATION QUANT: CPT | Performed by: NURSE PRACTITIONER

## 2021-07-13 NOTE — TELEPHONE ENCOUNTER
Results discussed with pt, he refuses PE protocol at this time, discussed risks associated with undiagnosed PE (ie death) with pt, he verb understanding, he agrees to have D-dimer checked, he will come to the office later today for this.  Also discussed possible ileus, pt reports feeling better at this time, the diarrhea has stopped and reports no abdominal pain or N/V.  Will do STAT referral to GI.  Will refer to general surgeon for gallstones.  Pt requesting his referrals be to .  Pt instructed to ER for SOB, severe abdominal pain or uncontrolled N/V, pt verb understanding

## 2021-07-14 ENCOUNTER — APPOINTMENT (OUTPATIENT)
Dept: MRI IMAGING | Facility: HOSPITAL | Age: 56
End: 2021-07-14

## 2021-07-14 DIAGNOSIS — R79.89 ELEVATED D-DIMER: Primary | ICD-10-CM

## 2021-07-15 ENCOUNTER — APPOINTMENT (OUTPATIENT)
Dept: MRI IMAGING | Facility: HOSPITAL | Age: 56
End: 2021-07-15

## 2021-07-19 ENCOUNTER — OFFICE VISIT (OUTPATIENT)
Dept: UROLOGY | Facility: CLINIC | Age: 56
End: 2021-07-19

## 2021-07-19 VITALS
HEIGHT: 71 IN | OXYGEN SATURATION: 94 % | DIASTOLIC BLOOD PRESSURE: 70 MMHG | HEART RATE: 64 BPM | WEIGHT: 238 LBS | RESPIRATION RATE: 20 BRPM | SYSTOLIC BLOOD PRESSURE: 130 MMHG | BODY MASS INDEX: 33.32 KG/M2

## 2021-07-19 DIAGNOSIS — N48.6 PEYRONIE DISEASE: Primary | ICD-10-CM

## 2021-07-19 PROCEDURE — 99203 OFFICE O/P NEW LOW 30 MIN: CPT | Performed by: UROLOGY

## 2021-07-19 RX ORDER — BISACODYL 5 MG/1
TABLET, DELAYED RELEASE ORAL
COMMUNITY
Start: 2021-07-16

## 2021-07-19 NOTE — PROGRESS NOTES
IsChief Complaint  Chief Complaint   Patient presents with   • Consult        Referring Provider:  Shabnam Zimmer*    HPI  Mr. Kuhn is a 55 y.o. male with below past medical history who presents with penile plaque x 1 yr.  Not painful.  He has minimal dorsal penile curvature associated with it.  He denies any lower urinary tract symptoms or ejaculatory dysfunction.     Hx of duputryns contractures    +ED - takes pDE5i.  Works well for him.  This is prescribed by his primary care physician.    Past Medical History  Past Medical History:   Diagnosis Date   • Arthritis    • Claustrophobia    • Hernia, abdominal    • Hyperlipidemia    • Hypertension    • Low back pain        Past Surgical History  Past Surgical History:   Procedure Laterality Date   • DENTAL PROCEDURE     • HIP SURGERY     • KNEE SURGERY     • REPLACEMENT TOTAL KNEE  12/2020   • SHOULDER SURGERY     • TONSILLECTOMY     • VASECTOMY     • WRIST ARTHROPLASTY         Medications    Current Outpatient Medications:   •  aspirin 81 MG chewable tablet, Chew 81 mg Daily., Disp: , Rfl:   •  bisacodyl (DULCOLAX) 5 MG EC tablet, Take 4 tabs 1 hr before starting bowel prep for colonoscopy, Disp: , Rfl:   •  diazePAM (VALIUM) 10 MG tablet, TAKE 1/2 OR 1 TABLET BY MOUTH EVERY EIGHT HOURS AS NEEDED FOR ANXIETY, Disp: 90 tablet, Rfl: 2  •  escitalopram (LEXAPRO) 20 MG tablet, Take 1 tablet by mouth Daily. 200001, Disp: 90 tablet, Rfl: 3  •  famotidine (Pepcid) 20 MG tablet, Take 1 tablet by mouth 2 (Two) Times a Day., Disp: 180 tablet, Rfl: 3  •  fluticasone (Flonase) 50 MCG/ACT nasal spray, 2 sprays into the nostril(s) as directed by provider Daily., Disp: 11.1 mL, Rfl: 0  •  gabapentin (NEURONTIN) 600 MG tablet, Take 2 tablets by mouth Every Evening., Disp: 60 tablet, Rfl: 2  •  hydroCHLOROthiazide (HYDRODIURIL) 25 MG tablet, Take 0.5 tablets by mouth Daily. 200001, Disp: 90 tablet, Rfl: 2  •  Ketoconazole 2 % gel, Use twice daily over affected areas.,  Disp: 45 g, Rfl: 0  •  losartan (COZAAR) 100 MG tablet, Take 1 tablet by mouth Daily. 200001, Disp: 90 tablet, Rfl: 3  •  methocarbamol (ROBAXIN) 750 MG tablet, Take 1 tablet by mouth 4 (Four) Times a Day As Needed for Muscle Spasms., Disp: 360 tablet, Rfl: 3  •  nebivolol (Bystolic) 10 MG tablet, Take 1 tablet by mouth Daily. 200001, Disp: 90 tablet, Rfl: 3  •  ondansetron ODT (ZOFRAN-ODT) 8 MG disintegrating tablet, Take 1/2 to 1 tablet by mouth (dissolve under tongue or swallow) every 8 hours as needed for nausea., Disp: 30 tablet, Rfl: 1  •  oxyCODONE-acetaminophen (PERCOCET)  MG per tablet, Take 1 tablet by mouth 3 (Three) Times a Day., Disp: , Rfl:   •  promethazine (PHENERGAN) 25 MG tablet, TAKE 1 TABLET BY MOUTH EVERY EIGHT HOURS AS NEEDED FOR NAUSEA AND VOMITING, Disp: 42 tablet, Rfl: 2  •  QUEtiapine (SEROquel) 25 MG tablet, Take 1 tablet by mouth Daily., Disp: 90 tablet, Rfl: 3  •  tadalafil (Cialis) 20 MG tablet, Take 1 tablet by mouth Daily As Needed for Erectile Dysfunction., Disp: 4 tablet, Rfl: 2  •  valACYclovir (VALTREX) 1000 MG tablet, TAKE 1 TABLET BY MOUTH ONE TIME A DAY , Disp: 30 tablet, Rfl: 0  •  zolpidem (Ambien) 5 MG tablet, Take 1 tablet by mouth At Night As Needed for Sleep., Disp: 30 tablet, Rfl: 2  •  clindamycin (CLEOCIN T) 1 % external solution, APPLY EXTERNALLY TO AFFECTED AREA TWO TIMES A DAY, Disp: 60 mL, Rfl: 3  •  diazePAM (VALIUM) 10 MG tablet, Take 1/2 to 1 tablet every 8 hours prn anxiety, Disp: 90 tablet, Rfl: 2    Allergies  Allergies   Allergen Reactions   • Prednisone Mental Status Change       Social History  Social History     Socioeconomic History   • Marital status:      Spouse name: Not on file   • Number of children: Not on file   • Years of education: Not on file   • Highest education level: Not on file   Tobacco Use   • Smoking status: Never Smoker   • Smokeless tobacco: Never Used   Vaping Use   • Vaping Use: Never used   Substance and Sexual  "Activity   • Alcohol use: Yes     Comment: occ   • Drug use: No   • Sexual activity: Defer       Family History  Family History   Problem Relation Age of Onset   • Arthritis Mother    • Hypertension Mother    • Hypertension Father    • Thyroid disease Father    • Heart attack Brother        Review of Systems  Review of systems was notable for erectile dysfunction.    Physical Exam  Visit Vitals  /70   Pulse 64   Resp 20   Ht 180.3 cm (70.98\")   Wt 108 kg (238 lb)   SpO2 94%   BMI 33.21 kg/m²     Physical exam was performed and was notable for obese    Genitourinary  Penis: circumcised penis, glans normal, no penile discharge.  No rashes/lesions.  + dorsal penile plaque  Testes: descended bilaterally, no masses, nontender to palpation. Remainder of scrotal contents normal. No hernia appreciated.       Labs  No results found for: PSA    Lab Results   Component Value Date    GLUCOSE 88 07/07/2021    CALCIUM 10.1 07/07/2021     07/07/2021    K 4.2 07/07/2021    CO2 26.0 07/07/2021    CL 98 07/07/2021    BUN 19 07/07/2021    CREATININE 1.37 (H) 07/07/2021    EGFRIFNONA 54 (L) 07/07/2021    BCR 13.9 07/07/2021    ANIONGAP 12.0 07/07/2021       Lab Results   Component Value Date    WBC 11.68 (H) 07/07/2021    HGB 19.5 (H) 07/07/2021    HCT 57.3 (H) 07/07/2021    MCV 92.6 07/07/2021     07/07/2021       Brief Urine Lab Results     None           Radiologic Studies  US Scrotum & Testicles  Result Date: 6/7/2021  No evidence of testicular mass or torsion.  This report was finalized on 6/7/2021 4:07 PM by Yumiko Inman M.D..      Assessment  Mr. Kuhn is a 55 y.o. male who presents with penile plaque / peyronies. Not bothered. Minimal curvature.  Positive erectile dysfunction well treated on Cialis.    I counseled him that he is likely in the chronic phase now Peyronies disease, and the amount of curvature and plaque that he has is likely to be stable.  I encouraged him to follow-up with me if he has " worsening of his curvature or develops pain, but these are unlikely.  I told him that approximately 13% of men have Peyronies disease.    Plan  1. RUDY Garcia MD

## 2021-07-21 ENCOUNTER — OFFICE VISIT (OUTPATIENT)
Dept: INTERNAL MEDICINE | Facility: CLINIC | Age: 56
End: 2021-07-21

## 2021-07-21 VITALS
DIASTOLIC BLOOD PRESSURE: 68 MMHG | BODY MASS INDEX: 33.46 KG/M2 | TEMPERATURE: 97.8 F | WEIGHT: 239 LBS | SYSTOLIC BLOOD PRESSURE: 132 MMHG | HEIGHT: 71 IN

## 2021-07-21 DIAGNOSIS — M25.50 CHRONIC JOINT PAIN: ICD-10-CM

## 2021-07-21 DIAGNOSIS — F51.01 PRIMARY INSOMNIA: ICD-10-CM

## 2021-07-21 DIAGNOSIS — G89.29 CHRONIC JOINT PAIN: ICD-10-CM

## 2021-07-21 DIAGNOSIS — N52.9 ERECTILE DYSFUNCTION, UNSPECIFIED ERECTILE DYSFUNCTION TYPE: Primary | ICD-10-CM

## 2021-07-21 PROCEDURE — 99214 OFFICE O/P EST MOD 30 MIN: CPT | Performed by: INTERNAL MEDICINE

## 2021-07-21 RX ORDER — GABAPENTIN 600 MG/1
1200 TABLET ORAL 3 TIMES DAILY
Qty: 60 TABLET | Refills: 2
Start: 2021-07-21

## 2021-07-21 RX ORDER — TADALAFIL 20 MG/1
20 TABLET ORAL DAILY PRN
Qty: 30 TABLET | Refills: 2 | Status: SHIPPED | OUTPATIENT
Start: 2021-07-21

## 2021-07-21 RX ORDER — ZOLPIDEM TARTRATE 5 MG/1
5 TABLET ORAL NIGHTLY PRN
Qty: 30 TABLET | Refills: 2 | Status: SHIPPED | OUTPATIENT
Start: 2021-07-21 | End: 2022-07-27 | Stop reason: SDUPTHER

## 2021-07-21 NOTE — PROGRESS NOTES
"Subjective   Raj Kuhn is a 55 y.o. male here for follow-up recent visit to urology where he was diagnosed with Peyronie's disease, ED, insomnia.  Patient says he is very relieved that it is nothing major going on in the penis.  He does have a slight curvature, does also suffer from ED.  Would like refill of Cialis as it works pretty well.  He found a coupon where he can get it pretty cheaply.  He also has insomnia, in March took a course of Ambien which helped him to get back to a regular sleep schedule and he would like to try that again.  No significant new problems today.  Says that abdominal pain has resolved.  He denies any shortness of breath.  Was recently evaluated for both abdominal infection and PE.  He was negative for PE though his D-dimer was elevated.  His abdominal imaging showed ileus and likely gastroenteritis.  No GI issues today.    I have reviewed the following portions of the patient's history and confirmed they are accurate: current medications, past medical history, past social history and problem list     I have personally completed the patient's review of systems.    Review of Systems:  General: negative  Respiratory: Negative  MSK: Hip pain  Neuro: negative  Psych: Insomnia  : ED  GI: Negative    Objective   /68   Temp 97.8 °F (36.6 °C) (Temporal)   Ht 180.3 cm (71\")   Wt 108 kg (239 lb)   BMI 33.33 kg/m²     Physical Exam  Vitals reviewed.   Constitutional:       Appearance: He is well-developed.   Pulmonary:      Effort: Pulmonary effort is normal.   Skin:     General: Skin is warm and dry.   Neurological:      Mental Status: He is alert and oriented to person, place, and time.   Psychiatric:         Behavior: Behavior normal.         Thought Content: Thought content normal.         Judgment: Judgment normal.         Assessment/Plan   Diagnoses and all orders for this visit:    1. Erectile dysfunction, unspecified erectile dysfunction type (Primary)  -     tadalafil " (Cialis) 20 MG tablet; Take 1 tablet by mouth Daily As Needed for Erectile Dysfunction.  Dispense: 30 tablet; Refill: 2  -refill med    2. Primary insomnia  -     zolpidem (Ambien) 5 MG tablet; Take 1 tablet by mouth At Night As Needed for Sleep.  Dispense: 30 tablet; Refill: 2  -restart course of ambien due to persistent insomnia.  Ideally patient will only use this to get sleep pattern back and then d/c               Yenny Gabriel MA    Please note that portions of this note were completed with a voice recognition program. Efforts were made to edit the dictations, but occasionally words are mistranscribed.

## 2021-07-27 ENCOUNTER — OFFICE VISIT (OUTPATIENT)
Dept: INTERNAL MEDICINE | Facility: CLINIC | Age: 56
End: 2021-07-27

## 2021-07-27 VITALS
WEIGHT: 235.8 LBS | RESPIRATION RATE: 16 BRPM | HEIGHT: 71 IN | BODY MASS INDEX: 33.01 KG/M2 | TEMPERATURE: 98.3 F | HEART RATE: 62 BPM | DIASTOLIC BLOOD PRESSURE: 82 MMHG | SYSTOLIC BLOOD PRESSURE: 142 MMHG | OXYGEN SATURATION: 95 %

## 2021-07-27 DIAGNOSIS — Z59.9 FINANCIAL DIFFICULTIES: ICD-10-CM

## 2021-07-27 DIAGNOSIS — I10 BENIGN ESSENTIAL HTN: Primary | ICD-10-CM

## 2021-07-27 PROCEDURE — 99213 OFFICE O/P EST LOW 20 MIN: CPT | Performed by: INTERNAL MEDICINE

## 2021-07-27 SDOH — ECONOMIC STABILITY - INCOME SECURITY: PROBLEM RELATED TO HOUSING AND ECONOMIC CIRCUMSTANCES, UNSPECIFIED: Z59.9

## 2021-07-28 NOTE — PROGRESS NOTES
"Subjective   Raj Kuhn is a 55 y.o. male here for follow-up HTN, needs a form filled out.  HTN: On Bystolic, HCTZ, losartan.  Needs samples of Bystolic.  Blood pressure has been well controlled.  He also has a form that he needs filled out, needs it signed so that he can get rate reduction.  He has had other doctor sign this already.       I have reviewed the following portions of the patient's history and confirmed they are accurate: current medications, past medical history, past social history and problem list     I have personally completed the patient's review of systems.    Review of Systems:  General: negative  CV: negative  Neuro: negative    Objective   /82   Pulse 62   Temp 98.3 °F (36.8 °C) (Infrared)   Resp 16   Ht 180.3 cm (70.98\")   Wt 107 kg (235 lb 12.8 oz)   SpO2 95%   BMI 32.90 kg/m²     Physical Exam  Vitals reviewed.   Constitutional:       Appearance: He is well-developed.   Pulmonary:      Effort: Pulmonary effort is normal.   Skin:     General: Skin is warm and dry.   Neurological:      Mental Status: He is alert and oriented to person, place, and time.   Psychiatric:         Behavior: Behavior normal.         Thought Content: Thought content normal.         Judgment: Judgment normal.         Assessment/Plan   Diagnoses and all orders for this visit:    1. Benign essential HTN (Primary)  -A bit elevated today but overall controlled, gave him samples of Bystolic.  Continue Bystolic, Hydrochlorothiazide, losartan at present doses    2. Financial difficulties  -Filled out form for him for written reduction           Shabnam Zimmer MD    Please note that portions of this note were completed with a voice recognition program. Efforts were made to edit the dictations, but occasionally words are mistranscribed.  "

## 2021-08-30 DIAGNOSIS — I10 BENIGN ESSENTIAL HTN: ICD-10-CM

## 2021-08-30 RX ORDER — LOSARTAN POTASSIUM 100 MG/1
100 TABLET ORAL DAILY
Qty: 90 TABLET | Refills: 3 | Status: SHIPPED | OUTPATIENT
Start: 2021-08-30 | End: 2022-02-23 | Stop reason: SDUPTHER

## 2021-08-30 RX ORDER — NEBIVOLOL 10 MG/1
10 TABLET ORAL DAILY
Qty: 90 TABLET | Refills: 3 | Status: SHIPPED | OUTPATIENT
Start: 2021-08-30 | End: 2022-02-23 | Stop reason: SDUPTHER

## 2021-08-31 DIAGNOSIS — K80.20 GALLSTONES: Primary | ICD-10-CM

## 2021-09-07 DIAGNOSIS — F41.9 ANXIETY: ICD-10-CM

## 2021-09-07 RX ORDER — DIAZEPAM 10 MG/1
TABLET ORAL
Qty: 90 TABLET | Refills: 2 | Status: SHIPPED | OUTPATIENT
Start: 2021-09-07 | End: 2021-12-03

## 2021-10-05 DIAGNOSIS — R11.0 NAUSEA: ICD-10-CM

## 2021-10-05 RX ORDER — ONDANSETRON 8 MG/1
TABLET, ORALLY DISINTEGRATING ORAL
Qty: 30 TABLET | Refills: 1 | Status: SHIPPED | OUTPATIENT
Start: 2021-10-05 | End: 2021-12-16 | Stop reason: SDUPTHER

## 2021-10-20 ENCOUNTER — TELEPHONE (OUTPATIENT)
Dept: INTERNAL MEDICINE | Facility: CLINIC | Age: 56
End: 2021-10-20

## 2021-10-20 NOTE — TELEPHONE ENCOUNTER
Spoke with him, he says he is having some lower abdominal pain. He's not having any fever, nausea or diarrhea . He would rather see you. He's ok with waiting until Friday if you think he can.

## 2021-10-20 NOTE — TELEPHONE ENCOUNTER
Caller: Raj Kuhn    Relationship to patient: Self    Best call back number: [phone]    Chief complaint: STOMACH PAIN    Type of visit: OFFICE    Requested date: 10/20    If rescheduling, when is the original appointment: NA     Additional notes:PATIENT STATES HE DOES NOT WANT TO SEE AN APRN OR PA. PATIENT STATES HE WOULD LIKE TO BE WORKED IN DR LONNY STEWARD IF POSSIBLE.

## 2021-10-22 ENCOUNTER — OFFICE VISIT (OUTPATIENT)
Dept: INTERNAL MEDICINE | Facility: CLINIC | Age: 56
End: 2021-10-22

## 2021-10-22 VITALS
WEIGHT: 231 LBS | BODY MASS INDEX: 32.34 KG/M2 | TEMPERATURE: 97.1 F | SYSTOLIC BLOOD PRESSURE: 140 MMHG | HEIGHT: 71 IN | DIASTOLIC BLOOD PRESSURE: 72 MMHG

## 2021-10-22 DIAGNOSIS — M87.052 AVASCULAR NECROSIS OF BONE OF LEFT HIP (HCC): ICD-10-CM

## 2021-10-22 DIAGNOSIS — M54.50 ACUTE BILATERAL LOW BACK PAIN WITHOUT SCIATICA: Primary | ICD-10-CM

## 2021-10-22 PROCEDURE — 99213 OFFICE O/P EST LOW 20 MIN: CPT | Performed by: INTERNAL MEDICINE

## 2021-10-22 NOTE — PROGRESS NOTES
"Subjective   Raj Kuhn is a 56 y.o. male here for acute low back pain that started after an episode at the gym on Monday.  He says he was lifting weights, thinks he may have tweaked his back.  Pain is low back, midline, radiating to either side.  No sciatica.  He is also dealing with a new diagnosis of left hip avascular necrosis.  He brings the imaging for review today.  He does have an appointment with Dr. Krishnamurthy his orthopedic.    I have reviewed the following portions of the patient's history and confirmed they are accurate: current medications, past medical history, past social history, past surgical history and problem list     I have personally reviewed and performed the ROS. Shabnam Zimmer MD     Review of Systems:  General: negative  Neuro: negative  MSK: See HPI    Objective   /72   Temp 97.1 °F (36.2 °C) (Temporal)   Ht 180.3 cm (71\")   Wt 105 kg (231 lb)   BMI 32.22 kg/m²     Physical Exam  Vitals reviewed.   Constitutional:       Appearance: He is well-developed.   Pulmonary:      Effort: Pulmonary effort is normal.   Skin:     General: Skin is warm and dry.   Neurological:      Mental Status: He is alert and oriented to person, place, and time.   Psychiatric:         Behavior: Behavior normal.         Thought Content: Thought content normal.         Judgment: Judgment normal.       Assessment/Plan   Diagnoses and all orders for this visit:    1. Acute bilateral low back pain without sciatica (Primary)  -toradol inj in office  -take it easy at gym next few days. Continue pain meds rx'd by pain mgmt, can take NSAIDs PRN           Shabnma Zimmer MD    Please note that portions of this note were completed with a voice recognition program. Efforts were made to edit the dictations, but occasionally words are mistranscribed.  "

## 2021-10-26 RX ORDER — KETOROLAC TROMETHAMINE 30 MG/ML
30 INJECTION, SOLUTION INTRAMUSCULAR; INTRAVENOUS ONCE
Status: SHIPPED | OUTPATIENT
Start: 2021-10-26 | End: 2021-10-31

## 2021-12-02 DIAGNOSIS — F41.9 ANXIETY: ICD-10-CM

## 2021-12-03 ENCOUNTER — OFFICE VISIT (OUTPATIENT)
Dept: INTERNAL MEDICINE | Facility: CLINIC | Age: 56
End: 2021-12-03

## 2021-12-03 ENCOUNTER — LAB (OUTPATIENT)
Dept: LAB | Facility: HOSPITAL | Age: 56
End: 2021-12-03

## 2021-12-03 VITALS
WEIGHT: 232 LBS | TEMPERATURE: 97.1 F | SYSTOLIC BLOOD PRESSURE: 132 MMHG | HEIGHT: 71 IN | BODY MASS INDEX: 32.48 KG/M2 | DIASTOLIC BLOOD PRESSURE: 78 MMHG

## 2021-12-03 DIAGNOSIS — M54.50 ACUTE BILATERAL LOW BACK PAIN WITHOUT SCIATICA: Primary | ICD-10-CM

## 2021-12-03 DIAGNOSIS — N41.0 ACUTE PROSTATITIS: ICD-10-CM

## 2021-12-03 DIAGNOSIS — J01.90 ACUTE NON-RECURRENT SINUSITIS, UNSPECIFIED LOCATION: ICD-10-CM

## 2021-12-03 DIAGNOSIS — R39.9 URINARY SYMPTOM OR SIGN: ICD-10-CM

## 2021-12-03 PROCEDURE — 96372 THER/PROPH/DIAG INJ SC/IM: CPT | Performed by: INTERNAL MEDICINE

## 2021-12-03 PROCEDURE — 99214 OFFICE O/P EST MOD 30 MIN: CPT | Performed by: INTERNAL MEDICINE

## 2021-12-03 RX ORDER — KETOROLAC TROMETHAMINE 30 MG/ML
30 INJECTION, SOLUTION INTRAMUSCULAR; INTRAVENOUS ONCE
Status: COMPLETED | OUTPATIENT
Start: 2021-12-03 | End: 2021-12-03

## 2021-12-03 RX ORDER — SULFAMETHOXAZOLE AND TRIMETHOPRIM 800; 160 MG/1; MG/1
1 TABLET ORAL 2 TIMES DAILY
Qty: 28 TABLET | Refills: 0 | Status: SHIPPED | OUTPATIENT
Start: 2021-12-03 | End: 2021-12-16 | Stop reason: SDUPTHER

## 2021-12-03 RX ORDER — DIAZEPAM 10 MG/1
TABLET ORAL
Qty: 90 TABLET | Refills: 2 | Status: SHIPPED | OUTPATIENT
Start: 2021-12-03 | End: 2022-03-02

## 2021-12-03 RX ADMIN — KETOROLAC TROMETHAMINE 30 MG: 30 INJECTION, SOLUTION INTRAMUSCULAR; INTRAVENOUS at 09:40

## 2021-12-03 NOTE — PROGRESS NOTES
"Subjective   Raj Kuhn is a 56 y.o. male here for acute low back pain, urinary issues, sinus symptoms.  Patient is having low back pain, both right and left side as well as midline no concentrated in the right lumbar area.  He can feel a knot in that area.  No injury.  Has chronic low back pain.  Normally a Toradol shot helps him.  He does not recall any injury.  He has also been having some urinary hesitancy for 2 days.  Noticed some whitish scant discharge on his black underwear, says this is similar to times in the past when he has had prostatitis.  He has not had prostatitis for a very long time.  No blood in the urine though he has noticed some slight odor for a few days.  He has had fever recently.  Also has some sinus congestion and pressure which is better today.  No fever today.    I have reviewed the following portions of the patient's history and confirmed they are accurate: current medications, past medical history, past social history, past surgical history and problem list     I have personally reviewed and performed the ROS. Shabnam Zimmer MD     Review of Systems:  General: negative  HEENT: See HPI  Respiratory: negative  Neuro: negative  MSK: Back pain  : See HPI    Objective   /78   Temp 97.1 °F (36.2 °C) (Temporal)   Ht 180.3 cm (71\")   Wt 105 kg (232 lb)   BMI 32.36 kg/m²     Physical Exam  Vitals reviewed.   Constitutional:       Appearance: He is well-developed.   Pulmonary:      Effort: Pulmonary effort is normal.   Musculoskeletal:      Lumbar back: Spasms and tenderness present. No edema.        Back:       Comments: Small muscle spasm noted over indicated area   Skin:     General: Skin is warm and dry.   Neurological:      Mental Status: He is alert and oriented to person, place, and time.   Psychiatric:         Behavior: Behavior normal.         Thought Content: Thought content normal.         Judgment: Judgment normal.         Assessment/Plan   Diagnoses and " all orders for this visit:    1. Acute bilateral low back pain without sciatica (Primary)  -     ketorolac (TORADOL) injection 30 mg  -     Urine Culture - Urine, Urine, Clean Catch    2. Acute prostatitis  -     sulfamethoxazole-trimethoprim (Bactrim DS) 800-160 MG per tablet; Take 1 tablet by mouth 2 (Two) Times a Day for 14 days.  Dispense: 28 tablet; Refill: 0    3. Acute non-recurrent sinusitis, unspecified location  -Advised patient that Bactrim may not treat the symptoms.  He is to let me know should symptoms get worse but they seem to be better today    4. Urinary symptom or sign  -     Urine Culture - Urine, Urine, Clean Catch             Shabnam Zimmer MD

## 2021-12-07 DIAGNOSIS — R50.9 FEVER, UNSPECIFIED FEVER CAUSE: Primary | ICD-10-CM

## 2021-12-08 LAB
BACTERIA UR CULT: ABNORMAL
BACTERIA UR CULT: ABNORMAL
OTHER ANTIBIOTIC SUSC ISLT: ABNORMAL

## 2021-12-16 ENCOUNTER — LAB (OUTPATIENT)
Dept: LAB | Facility: HOSPITAL | Age: 56
End: 2021-12-16

## 2021-12-16 ENCOUNTER — OFFICE VISIT (OUTPATIENT)
Dept: INTERNAL MEDICINE | Facility: CLINIC | Age: 56
End: 2021-12-16

## 2021-12-16 VITALS
DIASTOLIC BLOOD PRESSURE: 80 MMHG | WEIGHT: 234.8 LBS | HEIGHT: 71 IN | TEMPERATURE: 97.1 F | BODY MASS INDEX: 32.87 KG/M2 | SYSTOLIC BLOOD PRESSURE: 132 MMHG

## 2021-12-16 DIAGNOSIS — N30.00 ACUTE CYSTITIS WITHOUT HEMATURIA: Primary | ICD-10-CM

## 2021-12-16 DIAGNOSIS — R11.0 NAUSEA: ICD-10-CM

## 2021-12-16 DIAGNOSIS — N41.0 ACUTE PROSTATITIS: ICD-10-CM

## 2021-12-16 LAB
BILIRUB BLD-MCNC: NEGATIVE MG/DL
CLARITY, POC: CLEAR
COLOR UR: YELLOW
EXPIRATION DATE: NORMAL
GLUCOSE UR STRIP-MCNC: NEGATIVE MG/DL
KETONES UR QL: NEGATIVE
LEUKOCYTE EST, POC: NEGATIVE
Lab: NORMAL
NITRITE UR-MCNC: NEGATIVE MG/ML
PH UR: 6 [PH] (ref 5–8)
PROT UR STRIP-MCNC: NEGATIVE MG/DL
RBC # UR STRIP: NEGATIVE /UL
SP GR UR: 1.02 (ref 1–1.03)
UROBILINOGEN UR QL: NORMAL

## 2021-12-16 PROCEDURE — 99214 OFFICE O/P EST MOD 30 MIN: CPT | Performed by: INTERNAL MEDICINE

## 2021-12-16 PROCEDURE — 81003 URINALYSIS AUTO W/O SCOPE: CPT | Performed by: INTERNAL MEDICINE

## 2021-12-16 RX ORDER — SULFAMETHOXAZOLE AND TRIMETHOPRIM 800; 160 MG/1; MG/1
1 TABLET ORAL 2 TIMES DAILY
Qty: 28 TABLET | Refills: 0 | Status: SHIPPED | OUTPATIENT
Start: 2021-12-16 | End: 2021-12-30

## 2021-12-16 RX ORDER — ONDANSETRON 8 MG/1
TABLET, ORALLY DISINTEGRATING ORAL
Qty: 30 TABLET | Refills: 5 | Status: SHIPPED | OUTPATIENT
Start: 2021-12-16 | End: 2022-07-25

## 2021-12-16 NOTE — PROGRESS NOTES
"Subjective   Raj Kuhn is a 56 y.o. male here for follow-up acute prostatitis.  He has completed 2 weeks of Bactrim and symptoms are much improved.  He does not really have much symptoms left.  He is concerned that there may be some lingering infection though he denies any discharge like he was seeing on his underwear.  He would like refill of Zofran for nausea, does not happen often but likes to keep it on hand.       I have reviewed the following portions of the patient's history and confirmed they are accurate: current medications, past medical history, past social history and problem list     I have personally reviewed and performed the ROS. Shabnam Zimmer MD     Review of Systems:  General: negative  GI: Negative  : Negative    Objective   /80   Temp 97.1 °F (36.2 °C) (Temporal)   Ht 180.3 cm (71\")   Wt 107 kg (234 lb 12.8 oz)   BMI 32.75 kg/m²     Physical Exam  Vitals reviewed.   Constitutional:       Appearance: He is well-developed.   Pulmonary:      Effort: Pulmonary effort is normal.   Skin:     General: Skin is warm and dry.   Neurological:      Mental Status: He is alert and oriented to person, place, and time.   Psychiatric:         Behavior: Behavior normal.         Thought Content: Thought content normal.         Judgment: Judgment normal.         Assessment/Plan   Diagnoses and all orders for this visit:    1. Acute prostatitis (Primary)  -     Cancel: Urinalysis With Culture If Indicated -  -     Urinalysis With Culture If Indicated - Urine, Clean Catch; Future  -     Cancel: Urinalysis With Culture If Indicated - Urine, Clean Catch  -     POCT urinalysis dipstick, automated  -     sulfamethoxazole-trimethoprim (Bactrim DS) 800-160 MG per tablet; Take 1 tablet by mouth 2 (Two) Times a Day for 14 days.  Dispense: 28 tablet; Refill: 0    2. Nausea  -     ondansetron ODT (ZOFRAN-ODT) 8 MG disintegrating tablet; Take 1/2 to 1 tablet by mouth (dissolve under tongue or " swallow) every 8 hours as needed for nausea.  Dispense: 30 tablet; Refill: 5                 Shabnam Zimmer MD

## 2022-02-04 RX ORDER — SULFAMETHOXAZOLE AND TRIMETHOPRIM 800; 160 MG/1; MG/1
1 TABLET ORAL 2 TIMES DAILY
Qty: 28 TABLET | Refills: 0 | Status: SHIPPED | OUTPATIENT
Start: 2022-02-04 | End: 2022-05-24

## 2022-02-08 DIAGNOSIS — R30.0 DYSURIA: Primary | ICD-10-CM

## 2022-02-09 ENCOUNTER — LAB (OUTPATIENT)
Dept: LAB | Facility: HOSPITAL | Age: 57
End: 2022-02-09

## 2022-02-09 DIAGNOSIS — R30.0 DYSURIA: ICD-10-CM

## 2022-02-09 PROCEDURE — 81001 URINALYSIS AUTO W/SCOPE: CPT

## 2022-02-10 LAB
BACTERIA UR QL AUTO: ABNORMAL /HPF
BILIRUB UR QL STRIP: NEGATIVE
CLARITY UR: CLEAR
COLOR UR: YELLOW
GLUCOSE UR STRIP-MCNC: NEGATIVE MG/DL
HGB UR QL STRIP.AUTO: NEGATIVE
HYALINE CASTS UR QL AUTO: ABNORMAL /LPF
KETONES UR QL STRIP: NEGATIVE
LEUKOCYTE ESTERASE UR QL STRIP.AUTO: ABNORMAL
NITRITE UR QL STRIP: NEGATIVE
PH UR STRIP.AUTO: 6 [PH] (ref 5–8)
PROT UR QL STRIP: NEGATIVE
RBC # UR STRIP: ABNORMAL /HPF
REF LAB TEST METHOD: ABNORMAL
SP GR UR STRIP: 1.02 (ref 1–1.03)
SQUAMOUS #/AREA URNS HPF: ABNORMAL /HPF
UROBILINOGEN UR QL STRIP: ABNORMAL
WBC # UR STRIP: ABNORMAL /HPF

## 2022-02-23 DIAGNOSIS — R21 RASH: ICD-10-CM

## 2022-02-23 DIAGNOSIS — I10 BENIGN ESSENTIAL HTN: ICD-10-CM

## 2022-02-23 DIAGNOSIS — F32.A ANXIETY AND DEPRESSION: ICD-10-CM

## 2022-02-23 DIAGNOSIS — F51.01 PRIMARY INSOMNIA: ICD-10-CM

## 2022-02-23 DIAGNOSIS — F41.9 ANXIETY AND DEPRESSION: ICD-10-CM

## 2022-02-23 RX ORDER — NEBIVOLOL 10 MG/1
10 TABLET ORAL DAILY
Qty: 90 TABLET | Refills: 3 | Status: SHIPPED | OUTPATIENT
Start: 2022-02-23 | End: 2022-07-27 | Stop reason: SDUPTHER

## 2022-02-23 RX ORDER — QUETIAPINE FUMARATE 25 MG/1
25 TABLET, FILM COATED ORAL DAILY
Qty: 90 TABLET | Refills: 3 | Status: SHIPPED | OUTPATIENT
Start: 2022-02-23 | End: 2022-07-27

## 2022-02-23 RX ORDER — ESCITALOPRAM OXALATE 20 MG/1
20 TABLET ORAL DAILY
Qty: 90 TABLET | Refills: 3 | Status: SHIPPED | OUTPATIENT
Start: 2022-02-23 | End: 2023-02-28 | Stop reason: SDUPTHER

## 2022-02-23 RX ORDER — LOSARTAN POTASSIUM 100 MG/1
100 TABLET ORAL DAILY
Qty: 90 TABLET | Refills: 3 | Status: SHIPPED | OUTPATIENT
Start: 2022-02-23 | End: 2022-06-22 | Stop reason: SDUPTHER

## 2022-02-25 ENCOUNTER — TELEPHONE (OUTPATIENT)
Dept: INTERNAL MEDICINE | Facility: CLINIC | Age: 57
End: 2022-02-25

## 2022-02-25 RX ORDER — BENZONATATE 100 MG/1
100 CAPSULE ORAL 3 TIMES DAILY PRN
Qty: 21 CAPSULE | Refills: 1 | Status: SHIPPED | OUTPATIENT
Start: 2022-02-25 | End: 2022-05-24

## 2022-02-25 NOTE — TELEPHONE ENCOUNTER
Duplicate. He also sent a [employer] message. Dr. Zimmer will respond to him as soon as she has a chance to review.

## 2022-02-25 NOTE — TELEPHONE ENCOUNTER
Caller: Raj Kuhn    Relationship to patient: Self    Best call back number:     Date of exposure: NA    Date of positive COVID19 test: [id number] HOME TEST    Date if possible COVID19 exposure: NA    COVID19 symptoms: CONGESTION IN CHEST, SORE THROAT, FEVER YESTERDAY MORNING (BEING TAKEN TYLENOL) HEADACHE, FATIGUE    Date of initial quarantine: [id number]    Additional information or concerns:     What is the patients preferred pharmacy: [facility]    PATIENT ALSO SENT A MSG ON [employer]; PLEASE CALL TO ADVISE

## 2022-03-01 DIAGNOSIS — F41.9 ANXIETY: ICD-10-CM

## 2022-03-02 RX ORDER — DIAZEPAM 10 MG/1
TABLET ORAL
Qty: 90 TABLET | Refills: 2 | Status: SHIPPED | OUTPATIENT
Start: 2022-03-02 | End: 2022-05-24 | Stop reason: SDUPTHER

## 2022-03-24 NOTE — PROGRESS NOTES
"Subjective   Raj Kuhn is a 53 y.o. male here for follow-up HTN, anxiety. HTN: would like to get off some of his BP meds or change them up. Has been on current regimen for a long time. BP has generally been controlled. Anxiety: would like another rx for the valium. He is seeing a counselor, cried in her office last visit as they were talking about his mother who is . He feels very panicky sometimes and the valium has helped that previously. Wants to get a psychiatrist. Saw Jani earlier and he is now on pain meds and Jani is also going to take over gabapentin rx. Needs to recheck WBCs, elevated last visit but he did have an outbreak of HSV.     The following portions of the patient's history were reviewed and updated as appropriate: allergies, current medications, past medical history, past social history and problem list.    Review of Systems:  General: negative  CV: negative  Respiratory: negative  Neuro: negative  Psych: anxiety  MSK: multiple joint pain    Objective   /72 (BP Location: Right arm, Patient Position: Sitting, Cuff Size: Adult)   Temp 97.3 °F (36.3 °C) (Temporal Artery )   Ht 177.8 cm (70\")   Wt 114 kg (252 lb)   BMI 36.16 kg/m²     Physical Exam   Constitutional: He is oriented to person, place, and time. He appears well-developed and well-nourished.   Pulmonary/Chest: Effort normal.   Neurological: He is alert and oriented to person, place, and time.   Skin: Skin is warm and dry.   Psychiatric: He has a normal mood and affect. His behavior is normal. Judgment and thought content normal.   Vitals reviewed.      Assessment/Plan   Raj was seen today for hypertension.    Diagnoses and all orders for this visit:    Benign essential HTN  -     hydrochlorothiazide (HYDRODIURIL) 25 MG tablet; Take 1 tablet by mouth Daily.  -     Stop metoprolol, RTC 2 weeks    Leukocytosis, unspecified type  -     CBC Auto Differential    Anxiety about health  -     diazePAM (VALIUM) 10 MG tablet; " On 3/24/2022, for part of this note, Brunilda FARMER CT, scribed the services personally performed by John Montemayor DC.    DOI:  3/21/2022  Initial Treatment Date:  3/21/2022  Date Last Seen: 3/21/2022  [facility] of Onset:  Other  Occupation:  Retired    Referring by: Mesfin Salazar MD  Primary Care Physician: Mesfin Salazar MD  Date informed consent signed:  3/21/2022  ABN:  No    Allergies, including latex, Medications, Medical History, Surgical History, Social History and Family History were reviewed and updated.  Hansel reports that he has never smoked. He has never used smokeless tobacco.  Hansel is allergic to hydrocodone-acetaminophen.  Advance Directive Status:  Yes   Visit Number of Current Episode:  2    Office visit  Chief Complaint   Patient presents with   • Musculoskeletal Problem     Neck and back pain   • Office Visit        Subjective complaints as reported by the patient:  Hansel is a 75 year old male who comes in today for evaluation and treatment of neck and back pain. Since last visit his neck and back pain have varied from 2-4/10, currently 4/10.  He has been icing.    Objective findings include the following:  Observation  Standing posture reveals lumbar hypolordosis, thoracic hyperkyphosis, bilateral shoulder protraction with right low shoulder, and forward head posture.    Ranges of Motion   Seated Low Back AROMs were within normal limits.  There was pain with left lateral bending and right and left rotation.    Cervical AROMs were all greatly decreased.    Orthopedic Tests  Leg lengths are equal in the prone position  Prone bilateral heel to buttock was negative    Palpation  Passive joint motion was restricted and tender at both sacroiliac joints (right more so than left), the L3-S1 regions, T4-8, and C2-7.    Soft tissue palpation revealed spasm and tenderness in the suboccipital, scalenes, SCMs, upper trapezius, levator scapularis, rhomboids, QLs, TFLs, gluteals, and paraspinal  Take 0.5-1 tablets by mouth Daily As Needed for Anxiety. Looking into getting a psychiatrist, already seeing a counselor. Uses this prn panic attacks            muscles.    Assessment:    For chiropractic care, Medicare requires and only approves spinal somatic dysfunction diagnoses:  Cervical, thoracic, lumbar, sacral, and pelvic region somatic dysfunction.    Additional Comments/Complicating Factors:  On 3/21/2014 his [employer]/[place] biopsychosocial impact of pain score was 14/40, a 35% subjective disability index.    Progress may be prolonged or limited due to   1. Chronicity of symptoms   2. Degenerative changes    Plan:  Treatment today included:   Mechanical percussion and manual deep tissue myofascial release at the muscles noted above as taut and tender in order to decrease spasm, muscular restriction of motion, and pain.    Chiropractic manipulation included Heath distraction technique at the L3-S1 regions, repetitive mechanical toggle technique at both sacroiliac joints, manual straight axial traction at L5-S1 with caudal thrust, and manual straight axial traction with cephalad thrust at C2-7 in order to decrease joint restriction and pain, and improve function.  The setup for each manipulation was comfortable to the patient and he gave verbal authorization to proceed with each.  All treatment was well tolerated.    Therapeutic Exercise/Rehab/Modalities performed today:   Ultrasound therapy was performed by Kassi MOE, on his neck and upper back with the settings at 1.0 katz per centimeter squared, 50% duty cycle, for 5 minutes in order to decrease inflammation and pain, and promote healing.    Patient Instruction/Education:   Encouraged continued icing. Patient stated understanding of, and was in agreement with, the discussed instructions.    Goals of Care:   Goals of todays care is to improve muscular and skeletal function and provide symptom relief.      Response to Treatment:  After treatment patient graded his symptom level at  2/10.    Length of Visit:     25  minutes.    Next appointment:   4 days.    The documentation recorded by the scribe was reviewed and  edited by me and accurately and completely reflects the services performed, and the decisions made by me, Jovani Montemayor.

## 2022-05-24 ENCOUNTER — OFFICE VISIT (OUTPATIENT)
Dept: INTERNAL MEDICINE | Facility: CLINIC | Age: 57
End: 2022-05-24

## 2022-05-24 VITALS
TEMPERATURE: 97.4 F | BODY MASS INDEX: 33.88 KG/M2 | SYSTOLIC BLOOD PRESSURE: 134 MMHG | DIASTOLIC BLOOD PRESSURE: 82 MMHG | HEIGHT: 71 IN | WEIGHT: 242 LBS

## 2022-05-24 DIAGNOSIS — K21.9 GASTROESOPHAGEAL REFLUX DISEASE WITHOUT ESOPHAGITIS: Primary | ICD-10-CM

## 2022-05-24 DIAGNOSIS — Z12.11 COLON CANCER SCREENING: ICD-10-CM

## 2022-05-24 DIAGNOSIS — G89.29 CHRONIC JOINT PAIN: ICD-10-CM

## 2022-05-24 DIAGNOSIS — M25.50 CHRONIC JOINT PAIN: ICD-10-CM

## 2022-05-24 DIAGNOSIS — F41.9 ANXIETY: ICD-10-CM

## 2022-05-24 PROCEDURE — 96372 THER/PROPH/DIAG INJ SC/IM: CPT | Performed by: INTERNAL MEDICINE

## 2022-05-24 PROCEDURE — 99214 OFFICE O/P EST MOD 30 MIN: CPT | Performed by: INTERNAL MEDICINE

## 2022-05-24 RX ORDER — ESOMEPRAZOLE MAGNESIUM 40 MG/1
40 CAPSULE, DELAYED RELEASE ORAL
Qty: 30 CAPSULE | Refills: 5 | Status: SHIPPED | OUTPATIENT
Start: 2022-05-24 | End: 2023-02-28 | Stop reason: SDUPTHER

## 2022-05-24 RX ORDER — KETOROLAC TROMETHAMINE 30 MG/ML
30 INJECTION, SOLUTION INTRAMUSCULAR; INTRAVENOUS ONCE
Status: COMPLETED | OUTPATIENT
Start: 2022-05-24 | End: 2022-05-24

## 2022-05-24 RX ORDER — DIAZEPAM 10 MG/1
10 TABLET ORAL EVERY 8 HOURS PRN
Qty: 90 TABLET | Refills: 2 | Status: SHIPPED | OUTPATIENT
Start: 2022-05-24 | End: 2022-07-27 | Stop reason: SDUPTHER

## 2022-05-24 RX ADMIN — KETOROLAC TROMETHAMINE 30 MG: 30 INJECTION, SOLUTION INTRAMUSCULAR; INTRAVENOUS at 12:43

## 2022-05-24 NOTE — PROGRESS NOTES
"Subjective   Raj Kuhn is a 56 y.o. male here for worsening GERD, chronic joint pain, anxiety. Has been having more acid reflux lately. Feels it burning in his chest and throat. On famotidine which isn't really helping. Would like to try nexium, did well on that previously. Would like toradol inj today, hip is really bothering him. Plans on getting hip replacement in the fall with Dr. Krishnamurthy at . Anxiety has been stable.       I have reviewed the following portions of the patient's history and confirmed they are accurate: current medications, past medical history, past social history, past surgical history and problem list     I have personally reviewed and performed the ROS. Shabnam Zimmer MD     Review of Systems:  General: negative  GI: See hpi  Neuro: negative  Psych: negative  MSK: hip pain, arthralgias    Objective   /82   Temp 97.4 °F (36.3 °C) (Temporal)   Ht 180.3 cm (71\")   Wt 110 kg (242 lb)   BMI 33.75 kg/m²     Physical Exam  Vitals reviewed.   Constitutional:       Appearance: He is well-developed.   Pulmonary:      Effort: Pulmonary effort is normal.   Skin:     General: Skin is warm and dry.   Neurological:      Mental Status: He is alert and oriented to person, place, and time.   Psychiatric:         Behavior: Behavior normal.         Thought Content: Thought content normal.         Judgment: Judgment normal.         Assessment & Plan   Diagnoses and all orders for this visit:    1. Gastroesophageal reflux disease without esophagitis (Primary)  -     esomeprazole (nexIUM) 40 MG capsule; Take 1 capsule by mouth Every Morning Before Breakfast.  Dispense: 30 capsule; Refill: 5  -can either hold famotidine or take concurrently until sx improved    2. Chronic joint pain  -toradol inj today    3. Anxiety  -     diazePAM (VALIUM) 10 MG tablet; Take 1 tablet by mouth Every 8 (Eight) Hours As Needed for Anxiety.  Dispense: 90 tablet; Refill: 2  -chronic stable  The patient has " read and signed the [facility] Controlled Substance Contract.  I will continue to see patient for regular follow up appointments.  They are well controlled on their medication.  BENJAMIN is updated every 3 months. The patient is aware of the potential for addiction and dependence.    4. Colon cancer screening  -     Ambulatory Referral For Screening Colonoscopy             Shabnam Zimmer MD

## 2022-06-22 DIAGNOSIS — I10 BENIGN ESSENTIAL HTN: ICD-10-CM

## 2022-06-22 RX ORDER — LOSARTAN POTASSIUM 100 MG/1
100 TABLET ORAL DAILY
Qty: 10 TABLET | Refills: 0 | Status: SHIPPED | OUTPATIENT
Start: 2022-06-22 | End: 2022-07-06 | Stop reason: SDUPTHER

## 2022-07-06 DIAGNOSIS — I10 BENIGN ESSENTIAL HTN: ICD-10-CM

## 2022-07-06 RX ORDER — LOSARTAN POTASSIUM 100 MG/1
100 TABLET ORAL DAILY
Qty: 30 TABLET | Refills: 0 | Status: SHIPPED | OUTPATIENT
Start: 2022-07-06 | End: 2022-08-09

## 2022-07-12 RX ORDER — AZITHROMYCIN 250 MG/1
TABLET, FILM COATED ORAL
Qty: 6 TABLET | Refills: 0 | Status: SHIPPED | OUTPATIENT
Start: 2022-07-12 | End: 2022-11-29

## 2022-07-12 RX ORDER — FLUTICASONE PROPIONATE 50 MCG
2 SPRAY, SUSPENSION (ML) NASAL DAILY
Qty: 15.8 ML | Refills: 0 | Status: SHIPPED | OUTPATIENT
Start: 2022-07-12

## 2022-07-24 DIAGNOSIS — R11.0 NAUSEA: ICD-10-CM

## 2022-07-25 ENCOUNTER — TELEPHONE (OUTPATIENT)
Dept: INTERNAL MEDICINE | Facility: CLINIC | Age: 57
End: 2022-07-25

## 2022-07-25 RX ORDER — ONDANSETRON 8 MG/1
TABLET, ORALLY DISINTEGRATING ORAL
Qty: 30 TABLET | Refills: 0 | Status: SHIPPED | OUTPATIENT
Start: 2022-07-25 | End: 2022-08-24

## 2022-07-25 NOTE — TELEPHONE ENCOUNTER
Caller: Raj Kuhn    Relationship to patient: Self    Best call back number:     Chief complaint: STOMACH PAIN, BEYOND  INDIGESTION     Type of visit: OFFICE VISIT    Requested date: [id number]    If rescheduling, when is the original appointment: PATIENT HAS AN APPT [id number] BUT WANTS TO BE SEEN SOONER    Additional notes: THE STOMACH PAIN HAS BEEN GOING ON FOR A WEEK, AND DR SANCHEZ DIDN'T HAVE ANYTHING UNTIL NEXT WEDNESDAY    PLEASE CALL TO ADVISE

## 2022-07-27 ENCOUNTER — OFFICE VISIT (OUTPATIENT)
Dept: INTERNAL MEDICINE | Facility: CLINIC | Age: 57
End: 2022-07-27

## 2022-07-27 VITALS
WEIGHT: 238.4 LBS | DIASTOLIC BLOOD PRESSURE: 70 MMHG | BODY MASS INDEX: 33.38 KG/M2 | TEMPERATURE: 97 F | SYSTOLIC BLOOD PRESSURE: 134 MMHG | HEIGHT: 71 IN

## 2022-07-27 DIAGNOSIS — R10.11 RUQ PAIN: Primary | ICD-10-CM

## 2022-07-27 DIAGNOSIS — F41.9 ANXIETY: ICD-10-CM

## 2022-07-27 DIAGNOSIS — F51.01 PRIMARY INSOMNIA: ICD-10-CM

## 2022-07-27 DIAGNOSIS — I10 BENIGN ESSENTIAL HTN: ICD-10-CM

## 2022-07-27 DIAGNOSIS — K59.03 DRUG-INDUCED CONSTIPATION: ICD-10-CM

## 2022-07-27 DIAGNOSIS — A60.01 HERPES SIMPLEX INFECTION OF PENIS: ICD-10-CM

## 2022-07-27 DIAGNOSIS — K80.20 CALCULUS OF GALLBLADDER WITHOUT CHOLECYSTITIS WITHOUT OBSTRUCTION: ICD-10-CM

## 2022-07-27 PROCEDURE — 99214 OFFICE O/P EST MOD 30 MIN: CPT | Performed by: INTERNAL MEDICINE

## 2022-07-27 RX ORDER — DIAZEPAM 10 MG/1
10 TABLET ORAL EVERY 8 HOURS PRN
Qty: 90 TABLET | Refills: 2 | Status: SHIPPED | OUTPATIENT
Start: 2022-07-27 | End: 2022-11-16 | Stop reason: SDUPTHER

## 2022-07-27 RX ORDER — ZOLPIDEM TARTRATE 5 MG/1
5 TABLET ORAL NIGHTLY PRN
Qty: 30 TABLET | Refills: 0 | Status: SHIPPED | OUTPATIENT
Start: 2022-07-27 | End: 2022-09-13

## 2022-07-27 RX ORDER — VALACYCLOVIR HYDROCHLORIDE 1 G/1
1000 TABLET, FILM COATED ORAL DAILY
Qty: 30 TABLET | Refills: 0 | Status: SHIPPED | OUTPATIENT
Start: 2022-07-27 | End: 2022-12-05

## 2022-07-27 RX ORDER — DOCUSATE SODIUM 250 MG
250 CAPSULE ORAL DAILY
Qty: 90 CAPSULE | Refills: 2 | Status: SHIPPED | OUTPATIENT
Start: 2022-07-27 | End: 2022-11-28

## 2022-07-27 RX ORDER — NEBIVOLOL 10 MG/1
10 TABLET ORAL DAILY
Qty: 90 TABLET | Refills: 3 | Status: SHIPPED | OUTPATIENT
Start: 2022-07-27 | End: 2022-10-12

## 2022-07-27 NOTE — PROGRESS NOTES
"Subjective   Raj Kuhn is a 56 y.o. male here for RUQ pain. It has since resolved but it was aching in that area and he noticed it was worse with activity. Didn't feel like muscles. He does have known hx gallstones but has not had a CCY. No n/v/d, no current pain. Also here to f/u on chronic conditions, nothing new. He would like rx for stool softener as he has constipation on opiates (pain mgmt).      I have reviewed the following portions of the patient's history and confirmed they are accurate: current medications, past medical history, past social history, past surgical history and problem list     I have personally reviewed and performed the ROS. Shabnam Zimmer MD     Review of Systems:  General: negative  MSK: chronic joint pain  GI: see hpi    Objective   /70   Temp 97 °F (36.1 °C) (Temporal)   Ht 180.3 cm (71\")   Wt 108 kg (238 lb 6.4 oz)   BMI 33.25 kg/m²     Physical Exam  Vitals reviewed.   Constitutional:       Appearance: He is well-developed.   Pulmonary:      Effort: Pulmonary effort is normal.   Skin:     General: Skin is warm and dry.   Neurological:      Mental Status: He is alert and oriented to person, place, and time.   Psychiatric:         Behavior: Behavior normal.         Thought Content: Thought content normal.         Judgment: Judgment normal.         Assessment & Plan   Diagnoses and all orders for this visit:    1. RUQ pain (Primary)  -     US Gallbladder    2. Calculus of gallbladder without cholecystitis without obstruction  -noted on previous imaging (reviewed), will get US to eval as this was several years ago and new sx    3. Drug-induced constipation  -     docusate sodium (COLACE) 250 MG capsule; Take 1 capsule by mouth Daily.  Dispense: 90 capsule; Refill: 2    4. Herpes simplex infection of penis  -     valACYclovir (VALTREX) 1000 MG tablet; Take 1 tablet by mouth Daily. 200001  Dispense: 30 tablet; Refill: 0    5. Anxiety  -     diazePAM (VALIUM) 10 " MG tablet; Take 1 tablet by mouth Every 8 (Eight) Hours As Needed for Anxiety.  Dispense: 90 tablet; Refill: 2    6. Benign essential HTN  -     nebivolol (Bystolic) 10 MG tablet; Take 1 tablet by mouth Daily. 200001  Dispense: 90 tablet; Refill: 3    7. Primary insomnia  -     zolpidem (Ambien) 5 MG tablet; Take 1 tablet by mouth At Night As Needed for Sleep.  Dispense: 30 tablet; Refill: 0             Shabnam Zimmer MD

## 2022-08-09 DIAGNOSIS — I10 BENIGN ESSENTIAL HTN: ICD-10-CM

## 2022-08-09 RX ORDER — LOSARTAN POTASSIUM 100 MG/1
TABLET ORAL
Qty: 30 TABLET | Refills: 0 | Status: SHIPPED | OUTPATIENT
Start: 2022-08-09 | End: 2022-08-23

## 2022-08-09 RX ORDER — LOSARTAN POTASSIUM 100 MG/1
100 TABLET ORAL DAILY
Qty: 30 TABLET | Refills: 0 | OUTPATIENT
Start: 2022-08-09

## 2022-08-16 ENCOUNTER — OFFICE VISIT (OUTPATIENT)
Dept: INTERNAL MEDICINE | Facility: CLINIC | Age: 57
End: 2022-08-16

## 2022-08-16 VITALS
BODY MASS INDEX: 33.32 KG/M2 | WEIGHT: 238 LBS | HEIGHT: 71 IN | DIASTOLIC BLOOD PRESSURE: 68 MMHG | TEMPERATURE: 97.7 F | SYSTOLIC BLOOD PRESSURE: 122 MMHG

## 2022-08-16 DIAGNOSIS — K80.20 CALCULUS OF GALLBLADDER WITHOUT CHOLECYSTITIS WITHOUT OBSTRUCTION: Primary | ICD-10-CM

## 2022-08-16 PROCEDURE — 99213 OFFICE O/P EST LOW 20 MIN: CPT | Performed by: INTERNAL MEDICINE

## 2022-08-16 NOTE — PROGRESS NOTES
"Subjective   Raj Kuhn is a 56 y.o. male here for follow-up recent ultrasound of the gallbladder.  He has known gallstones and he was having some right upper quadrant pain.  He recently got ultrasound which I do not have for review at the time of his appointment.  He needs a hip replacement and he wants to get that done this year.  He would prefer to not have to get the gallbladder removed if possible.  He denies any GI symptoms currently.  He has been having fried chicken recently and not having any GI issues.    I have reviewed the following portions of the patient's history and confirmed they are accurate: current medications, past medical history, past social history and problem list     I have personally reviewed and performed the ROS. Shabnam Zimmer MD     Review of Systems:  General: negative  GI: Negative    Objective   /68   Temp 97.7 °F (36.5 °C) (Temporal)   Ht 180.3 cm (71\")   Wt 108 kg (238 lb)   BMI 33.19 kg/m²     Physical Exam  Vitals reviewed.   Constitutional:       Appearance: He is well-developed.   Pulmonary:      Effort: Pulmonary effort is normal.   Skin:     General: Skin is warm and dry.   Neurological:      Mental Status: He is alert and oriented to person, place, and time.   Psychiatric:         Behavior: Behavior normal.         Thought Content: Thought content normal.         Judgment: Judgment normal.         Assessment & Plan   Diagnoses and all orders for this visit:    1. Calculus of gallbladder without cholecystitis without obstruction (Primary)  -reviewed CT abd, has some stones and gallbladder thickening, no cholecystitis. Recent US gallbladder reveals similar.  Will advise patient via MyChart that ultimately it is his decision but I think he would be okay to wait and get his hip replacement and then address the gallstones afterwards.  He has already had surgical consultation in the past and surgeon did not recommend CCY at that time as he was " asymptomatic and I think that will be the case this time as well           Shabnam Zimmer MD

## 2022-08-23 DIAGNOSIS — I10 BENIGN ESSENTIAL HTN: ICD-10-CM

## 2022-08-23 RX ORDER — LOSARTAN POTASSIUM 100 MG/1
TABLET ORAL
Qty: 30 TABLET | Refills: 5 | Status: SHIPPED | OUTPATIENT
Start: 2022-08-23 | End: 2023-02-28 | Stop reason: SDUPTHER

## 2022-08-24 DIAGNOSIS — R11.0 NAUSEA: ICD-10-CM

## 2022-08-24 RX ORDER — ONDANSETRON 8 MG/1
TABLET, ORALLY DISINTEGRATING ORAL
Qty: 30 TABLET | Refills: 0 | Status: SHIPPED | OUTPATIENT
Start: 2022-08-24 | End: 2022-10-14

## 2022-09-12 DIAGNOSIS — F51.01 PRIMARY INSOMNIA: ICD-10-CM

## 2022-09-13 RX ORDER — ZOLPIDEM TARTRATE 5 MG/1
TABLET ORAL
Qty: 30 TABLET | Refills: 2 | Status: SHIPPED | OUTPATIENT
Start: 2022-09-13 | End: 2022-12-14

## 2022-09-26 ENCOUNTER — OFFICE VISIT (OUTPATIENT)
Dept: INTERNAL MEDICINE | Facility: CLINIC | Age: 57
End: 2022-09-26

## 2022-09-26 VITALS
TEMPERATURE: 98.2 F | HEART RATE: 59 BPM | DIASTOLIC BLOOD PRESSURE: 92 MMHG | BODY MASS INDEX: 33.23 KG/M2 | HEIGHT: 71 IN | WEIGHT: 237.4 LBS | SYSTOLIC BLOOD PRESSURE: 150 MMHG | OXYGEN SATURATION: 98 %

## 2022-09-26 DIAGNOSIS — R06.83 SNORING: ICD-10-CM

## 2022-09-26 DIAGNOSIS — R71.8 ELEVATED RED BLOOD CELL COUNT: Primary | ICD-10-CM

## 2022-09-26 PROCEDURE — 85025 COMPLETE CBC W/AUTO DIFF WBC: CPT | Performed by: PHYSICIAN ASSISTANT

## 2022-09-26 PROCEDURE — 82728 ASSAY OF FERRITIN: CPT | Performed by: PHYSICIAN ASSISTANT

## 2022-09-26 PROCEDURE — 83540 ASSAY OF IRON: CPT | Performed by: PHYSICIAN ASSISTANT

## 2022-09-26 PROCEDURE — 80053 COMPREHEN METABOLIC PANEL: CPT | Performed by: PHYSICIAN ASSISTANT

## 2022-09-26 PROCEDURE — 84466 ASSAY OF TRANSFERRIN: CPT | Performed by: PHYSICIAN ASSISTANT

## 2022-09-26 PROCEDURE — 36415 COLL VENOUS BLD VENIPUNCTURE: CPT | Performed by: PHYSICIAN ASSISTANT

## 2022-09-26 PROCEDURE — 99213 OFFICE O/P EST LOW 20 MIN: CPT | Performed by: PHYSICIAN ASSISTANT

## 2022-09-26 NOTE — PROGRESS NOTES
[facility]  [address]  [facility] [address]  Dept: [phone]  Dept Fax: [phone]  Loc: [phone]  Loc Fax: [phone]    Raj Kuhn  1965    Follow Up Office Visit Note    History of Present Illness:  Patient is a 57-year-old male in today for preop clearance for hip surgery.  Was recently seen by orthopedics and was noted to have elevated red blood cell count, hemoglobin, and hematocrit.  Patient does have a history of sleep apnea.  Not on any CPAP or any machine at nighttime.  Needing referral back to sleep studies for further evaluation.  Patient needing blood work redone today.  Denies any unexpected or unexplained weight loss recently, denies any night sweats.  Patient is on a baby aspirin daily.  Denies any.  And reports using supplements that are testosterone like recently.      The following portions of the patient's history were reviewed and updated as appropriate: allergies, current medications, past family history, past medical history, past social history, past surgical history, and problem list.    Medications:    Current Outpatient Medications:   •  aspirin 81 MG chewable tablet, Chew 81 mg Daily., Disp: , Rfl:   •  azithromycin (ZITHROMAX) 250 MG tablet, Take 2 tablets the first day, then 1 tablet daily for 4 days., Disp: 6 tablet, Rfl: 0  •  bisacodyl (DULCOLAX) 5 MG EC tablet, Take 4 tabs 1 hr before starting bowel prep for colonoscopy, Disp: , Rfl:   •  clindamycin (CLEOCIN T) 1 % external solution, APPLY EXTERNALLY TO AFFECTED AREA TWO TIMES A DAY, Disp: 60 mL, Rfl: 3  •  diazePAM (VALIUM) 10 MG tablet, Take 1 tablet by mouth Every 8 (Eight) Hours As Needed for Anxiety., Disp: 90 tablet, Rfl: 2  •  docusate sodium (COLACE) 250 MG capsule, Take 1 capsule by mouth Daily., Disp: 90 capsule, Rfl: 2  •  escitalopram (LEXAPRO) 20 MG tablet, Take 1 tablet by mouth Daily. 200001, Disp: 90 tablet, Rfl: 3  •  esomeprazole  (nexIUM) 40 MG capsule, Take 1 capsule by mouth Every Morning Before Breakfast., Disp: 30 capsule, Rfl: 5  •  famotidine (Pepcid) 20 MG tablet, Take 1 tablet by mouth 2 (Two) Times a Day., Disp: 180 tablet, Rfl: 3  •  fluticasone (Flonase) 50 MCG/ACT nasal spray, 2 sprays into the nostril(s) as directed by provider Daily., Disp: 15.8 mL, Rfl: 0  •  gabapentin (NEURONTIN) 600 MG tablet, Take 2 tablets by mouth 3 (Three) Times a Day., Disp: 60 tablet, Rfl: 2  •  hydroCHLOROthiazide (HYDRODIURIL) 25 MG tablet, Take 0.5 tablets by mouth Daily. 200001, Disp: 90 tablet, Rfl: 2  •  Ketoconazole 2 % gel, Use twice daily over affected areas., Disp: 45 g, Rfl: 0  •  losartan (COZAAR) 100 MG tablet, TAKE 1 TABLET BY MOUTH EVERY DAY, Disp: 30 tablet, Rfl: 5  •  methocarbamol (ROBAXIN) 750 MG tablet, Take 1 tablet by mouth 4 (Four) Times a Day As Needed for Muscle Spasms., Disp: 360 tablet, Rfl: 3  •  nebivolol (Bystolic) 10 MG tablet, Take 1 tablet by mouth Daily. 200001, Disp: 90 tablet, Rfl: 3  •  ondansetron ODT (ZOFRAN-ODT) 8 MG disintegrating tablet, DISSOLVE 1/2 or 1 TABLET IN MOUTH EVERY EIGHT HOURS AS NEEDED FOR NAUSEA, Disp: 30 tablet, Rfl: 0  •  oxyCODONE-acetaminophen (PERCOCET)  MG per tablet, Take 1 tablet by mouth 3 (Three) Times a Day., Disp: , Rfl:   •  tadalafil (Cialis) 20 MG tablet, Take 1 tablet by mouth Daily As Needed for Erectile Dysfunction., Disp: 30 tablet, Rfl: 2  •  valACYclovir (VALTREX) 1000 MG tablet, Take 1 tablet by mouth Daily. 200001, Disp: 30 tablet, Rfl: 0  •  zolpidem (AMBIEN) 5 MG tablet, TAKE 1 TABLET BY MOUTH EVERY DAY AT NIGHT AS NEEDED FOR SLEEP, Disp: 30 tablet, Rfl: 2    Subjective  Allergies   Allergen Reactions   • Prednisone Mental Status Change        Past Medical History:   Diagnosis Date   • Arthritis    • Claustrophobia    • Hernia, abdominal    • Hyperlipidemia    • Hypertension    • Low back pain        Past Surgical History:   Procedure Laterality Date   • DENTAL  "PROCEDURE     • HIP SURGERY     • KNEE SURGERY     • REPLACEMENT TOTAL KNEE  12/2020   • SHOULDER SURGERY     • TONSILLECTOMY     • VASECTOMY     • WRIST ARTHROPLASTY         Family History   Problem Relation Age of Onset   • Arthritis Mother    • Hypertension Mother    • Hypertension Father    • Thyroid disease Father    • Heart attack Brother         Social History     Socioeconomic History   • Marital status:    Tobacco Use   • Smoking status: Never Smoker   • Smokeless tobacco: Never Used   Vaping Use   • Vaping Use: Never used   Substance and Sexual Activity   • Alcohol use: Yes     Comment: occ   • Drug use: No   • Sexual activity: Defer       Review of Systems   Constitutional: Negative for activity change, chills, fatigue, fever and unexpected weight change.   HENT: Negative for congestion, ear pain, postnasal drip, sinus pressure and sore throat.    Eyes: Negative for pain, discharge and redness.   Respiratory: Negative for cough, shortness of breath and wheezing.    Cardiovascular: Negative for chest pain, palpitations and leg swelling.   Gastrointestinal: Negative for diarrhea, nausea and vomiting.   Endocrine: Negative for cold intolerance and heat intolerance.   Genitourinary: Negative for decreased urine volume and dysuria.   Musculoskeletal: Positive for arthralgias. Negative for myalgias.   Skin: Negative for rash and wound.   Neurological: Negative for dizziness, light-headedness and headaches.   Hematological: Does not bruise/bleed easily.   Psychiatric/Behavioral: Negative for confusion, dysphoric mood and sleep disturbance. The patient is not nervous/anxious.          Objective  Vitals:    09/26/22 1030   BP: 150/92   BP Location: Right arm   Patient Position: Sitting   Pulse: 59   Temp: 98.2 °F (36.8 °C)   TempSrc: Temporal   SpO2: 98%   Weight: 108 kg (237 lb 6.4 oz)   Height: 180.3 cm (70.98\")     Body mass index is 33.13 kg/m².     Physical Exam  Physical Exam  Vitals and nursing note " reviewed.   Constitutional:       General: He is not in acute distress.     Appearance: He is not ill-appearing.   HENT:      Head: Normocephalic.      Right Ear: Tympanic membrane, ear canal and external ear normal. There is no impacted cerumen.      Left Ear: Tympanic membrane, ear canal and external ear normal. There is no impacted cerumen.      Nose: No congestion or rhinorrhea.      Mouth/Throat:      Mouth: Mucous membranes are moist.      Pharynx: Oropharynx is clear. No oropharyngeal exudate or posterior oropharyngeal erythema.   Eyes:      General:         Right eye: No discharge.         Left eye: No discharge.      Extraocular Movements: Extraocular movements intact.      Conjunctiva/sclera: Conjunctivae normal.      Pupils: Pupils are equal, round, and reactive to light.   Cardiovascular:      Rate and Rhythm: Normal rate and regular rhythm.      Heart sounds: Normal heart sounds. No murmur heard.    No friction rub. No gallop.   Pulmonary:      Effort: Pulmonary effort is normal. No respiratory distress.      Breath sounds: Normal breath sounds. No wheezing.   Abdominal:      General: Bowel sounds are normal. There is no distension.      Palpations: Abdomen is soft. There is no mass.      Tenderness: There is no abdominal tenderness.   Musculoskeletal:         General: No swelling. Normal range of motion.      Cervical back: Normal range of motion. No tenderness.      Right lower leg: No edema.      Left lower leg: No edema.   Lymphadenopathy:      Cervical: No cervical adenopathy.   Skin:     Findings: No bruising, erythema or rash.   Neurological:      Mental Status: He is oriented to person, place, and time.      Gait: Gait normal.   Psychiatric:         Mood and Affect: Mood normal.         Behavior: Behavior normal.         Thought Content: Thought content normal.         Judgment: Judgment normal.         Diagnostic Data  Procedures    Assessment  Diagnoses and all orders for this visit:    1.  Elevated red blood cell count (Primary)  -     CBC w AUTO Differential; Future  -     Cancel: Comprehensive Metabolic Panel  -     Iron and TIBC; Future  -     Ferritin; Future  -     Transferrin; Future  -     CBC & Differential; Future  -     Comprehensive Metabolic Panel; Future  -     Iron and TIBC  -     Ferritin  -     Cancel: Transferrin  -     CBC & Differential  -     Comprehensive Metabolic Panel    2. Snoring  -     Ambulatory Referral to Sleep Medicine    Other orders  -     Cancel: CBC w AUTO Differential        Plan    1. Elevated red blood cell count (Primary)- obtain labs per above.    2. Snoring- ref to sleep studies.      Return in about 2 weeks (around 10/10/2022) for Recheck w/ Dr. Zimmer.    Lon Courtney PA-C  09/26/2022

## 2022-09-27 LAB
ALBUMIN SERPL-MCNC: 4.9 G/DL (ref 3.5–5.2)
ALBUMIN/GLOB SERPL: 1.9 G/DL
ALP SERPL-CCNC: 62 U/L (ref 39–117)
ALT SERPL W P-5'-P-CCNC: 38 U/L (ref 1–41)
ANION GAP SERPL CALCULATED.3IONS-SCNC: 9 MMOL/L (ref 5–15)
AST SERPL-CCNC: 38 U/L (ref 1–40)
BASOPHILS # BLD AUTO: 0.1 10*3/MM3 (ref 0–0.2)
BASOPHILS NFR BLD AUTO: 1 % (ref 0–1.5)
BILIRUB SERPL-MCNC: 1 MG/DL (ref 0–1.2)
BUN SERPL-MCNC: 16 MG/DL (ref 6–20)
BUN/CREAT SERPL: 13.3 (ref 7–25)
CALCIUM SPEC-SCNC: 10.4 MG/DL (ref 8.6–10.5)
CHLORIDE SERPL-SCNC: 95 MMOL/L (ref 98–107)
CO2 SERPL-SCNC: 27 MMOL/L (ref 22–29)
CREAT SERPL-MCNC: 1.2 MG/DL (ref 0.76–1.27)
DEPRECATED RDW RBC AUTO: 44.3 FL (ref 37–54)
EGFRCR SERPLBLD CKD-EPI 2021: 70.1 ML/MIN/1.73
EOSINOPHIL # BLD AUTO: 0.21 10*3/MM3 (ref 0–0.4)
EOSINOPHIL NFR BLD AUTO: 2.2 % (ref 0.3–6.2)
ERYTHROCYTE [DISTWIDTH] IN BLOOD BY AUTOMATED COUNT: 13.6 % (ref 12.3–15.4)
FERRITIN SERPL-MCNC: 87.6 NG/ML (ref 30–400)
GLOBULIN UR ELPH-MCNC: 2.6 GM/DL
GLUCOSE SERPL-MCNC: 94 MG/DL (ref 65–99)
HCT VFR BLD AUTO: 62.4 % (ref 37.5–51)
HGB BLD-MCNC: 21.2 G/DL (ref 13–17.7)
IMM GRANULOCYTES # BLD AUTO: 0.03 10*3/MM3 (ref 0–0.05)
IMM GRANULOCYTES NFR BLD AUTO: 0.3 % (ref 0–0.5)
IRON 24H UR-MRATE: 205 MCG/DL (ref 59–158)
IRON SATN MFR SERPL: 39 % (ref 20–50)
LYMPHOCYTES # BLD AUTO: 2.21 10*3/MM3 (ref 0.7–3.1)
LYMPHOCYTES NFR BLD AUTO: 23.1 % (ref 19.6–45.3)
MCH RBC QN AUTO: 31.8 PG (ref 26.6–33)
MCHC RBC AUTO-ENTMCNC: 34 G/DL (ref 31.5–35.7)
MCV RBC AUTO: 93.6 FL (ref 79–97)
MONOCYTES # BLD AUTO: 0.99 10*3/MM3 (ref 0.1–0.9)
MONOCYTES NFR BLD AUTO: 10.4 % (ref 5–12)
NEUTROPHILS NFR BLD AUTO: 6.01 10*3/MM3 (ref 1.7–7)
NEUTROPHILS NFR BLD AUTO: 63 % (ref 42.7–76)
NRBC BLD AUTO-RTO: 0 /100 WBC (ref 0–0.2)
PLATELET # BLD AUTO: 211 10*3/MM3 (ref 140–450)
PMV BLD AUTO: 10.1 FL (ref 6–12)
POTASSIUM SERPL-SCNC: 4.2 MMOL/L (ref 3.5–5.2)
PROT SERPL-MCNC: 7.5 G/DL (ref 6–8.5)
RBC # BLD AUTO: 6.67 10*6/MM3 (ref 4.14–5.8)
SODIUM SERPL-SCNC: 131 MMOL/L (ref 136–145)
TIBC SERPL-MCNC: 530 MCG/DL (ref 298–536)
TRANSFERRIN SERPL-MCNC: 356 MG/DL (ref 200–360)
WBC NRBC COR # BLD: 9.55 10*3/MM3 (ref 3.4–10.8)

## 2022-10-12 ENCOUNTER — OFFICE VISIT (OUTPATIENT)
Dept: INTERNAL MEDICINE | Facility: CLINIC | Age: 57
End: 2022-10-12

## 2022-10-12 ENCOUNTER — LAB (OUTPATIENT)
Dept: LAB | Facility: HOSPITAL | Age: 57
End: 2022-10-12

## 2022-10-12 VITALS
WEIGHT: 232 LBS | TEMPERATURE: 97.8 F | DIASTOLIC BLOOD PRESSURE: 74 MMHG | BODY MASS INDEX: 32.48 KG/M2 | SYSTOLIC BLOOD PRESSURE: 132 MMHG | HEIGHT: 71 IN

## 2022-10-12 DIAGNOSIS — Z12.5 PROSTATE CANCER SCREENING: ICD-10-CM

## 2022-10-12 DIAGNOSIS — I10 BENIGN ESSENTIAL HTN: ICD-10-CM

## 2022-10-12 DIAGNOSIS — E87.5 HYPERKALEMIA: ICD-10-CM

## 2022-10-12 DIAGNOSIS — R00.1 BRADYCARDIA: ICD-10-CM

## 2022-10-12 DIAGNOSIS — Z12.11 COLON CANCER SCREENING: ICD-10-CM

## 2022-10-12 DIAGNOSIS — Z79.899 MEDICATION MANAGEMENT: ICD-10-CM

## 2022-10-12 DIAGNOSIS — E87.1 HYPONATREMIA: ICD-10-CM

## 2022-10-12 DIAGNOSIS — Z13.220 LIPID SCREENING: ICD-10-CM

## 2022-10-12 DIAGNOSIS — D75.1 ERYTHROCYTOSIS: Primary | ICD-10-CM

## 2022-10-12 PROCEDURE — 99214 OFFICE O/P EST MOD 30 MIN: CPT | Performed by: INTERNAL MEDICINE

## 2022-10-12 RX ORDER — NEBIVOLOL 10 MG/1
5 TABLET ORAL DAILY
Qty: 90 TABLET | Refills: 3
Start: 2022-10-12

## 2022-10-12 NOTE — PROGRESS NOTES
"Subjective   Raj Kuhn is a 57 y.o. male here for follow-up recent labs showing hyponatremia but more notably erythrocytosis with a Hb of 21. He was using testosterone but has since stopped. He has done therapeutic phlebo x1. He denies any sx initially and has been feeling ok. He has noticed bradycardia in am, HR into the 40s. He is on bystolic for BP, has been well-controlled.      I have reviewed the following portions of the patient's history and confirmed they are accurate: current medications, past medical history, past social history and problem list     I have personally reviewed and performed the ROS. Shabnam Zimmer MD     Review of Systems:  General: negative  Heme: negative  Neuro: negative  Psych: negative    Objective   /74   Temp 97.8 °F (36.6 °C) (Temporal)   Ht 180.3 cm (71\")   Wt 105 kg (232 lb)   BMI 32.36 kg/m²     Physical Exam  Vitals reviewed.   Constitutional:       Appearance: He is well-developed.   Pulmonary:      Effort: Pulmonary effort is normal.   Skin:     General: Skin is warm and dry.      Comments: No flushing   Neurological:      Mental Status: He is alert and oriented to person, place, and time.   Psychiatric:         Behavior: Behavior normal.         Thought Content: Thought content normal.         Judgment: Judgment normal.         Assessment & Plan   Diagnoses and all orders for this visit:    1. Erythrocytosis (Primary)  -     CBC (No Diff)  -highly suspect related to exogenous testosterone use, since he has stopped and done phlebo x1 suspect Hb will be much improved. If it is, then pt should be cleared for ortho surgery  -reviewed last PA note and last set of labs    2. Hyponatremia  -     Basic Metabolic Panel    3. Prostate cancer screening  -     PSA Screen    4. Colon cancer screening  -     Ambulatory Referral For Screening Colonoscopy    5. Lipid screening  -     Lipid Panel    6. Benign essential HTN  -     nebivolol (Bystolic) 10 MG tablet; " Take 0.5 tablets by mouth Daily. 200001  Dispense: 90 tablet; Refill: 3  -half dose due to bradycardia, monitor BP closely    7. Bradycardia  -see #6    8. Medication management  -     Drug Analysis,Comp,Oral Fluid - Saliva,; Future  -     Drug Analysis,Comp,Oral Fluid - Saliva,             Shabnam Zimmer MD

## 2022-10-14 ENCOUNTER — TELEPHONE (OUTPATIENT)
Dept: INTERNAL MEDICINE | Facility: CLINIC | Age: 57
End: 2022-10-14

## 2022-10-14 ENCOUNTER — LAB (OUTPATIENT)
Dept: LAB | Facility: HOSPITAL | Age: 57
End: 2022-10-14

## 2022-10-14 DIAGNOSIS — R11.0 NAUSEA: ICD-10-CM

## 2022-10-14 DIAGNOSIS — E87.5 HYPERKALEMIA: ICD-10-CM

## 2022-10-14 LAB
BUN SERPL-MCNC: 15 MG/DL (ref 6–20)
BUN/CREAT SERPL: 12.5 (ref 7–25)
CALCIUM SERPL-MCNC: 9.8 MG/DL (ref 8.6–10.5)
CHLORIDE SERPL-SCNC: 97 MMOL/L (ref 98–107)
CHOLEST SERPL-MCNC: 189 MG/DL (ref 0–200)
CO2 SERPL-SCNC: 28.9 MMOL/L (ref 22–29)
CREAT SERPL-MCNC: 1.2 MG/DL (ref 0.76–1.27)
EGFRCR SERPLBLD CKD-EPI 2021: 70.1 ML/MIN/1.73
ERYTHROCYTE [DISTWIDTH] IN BLOOD BY AUTOMATED COUNT: 12.3 % (ref 12.3–15.4)
GLUCOSE SERPL-MCNC: 108 MG/DL (ref 65–99)
HCT VFR BLD AUTO: 54.4 % (ref 37.5–51)
HDLC SERPL-MCNC: 38 MG/DL (ref 40–60)
HGB BLD-MCNC: 18.6 G/DL (ref 13–17.7)
LDLC SERPL CALC-MCNC: 111 MG/DL (ref 0–100)
MCH RBC QN AUTO: 31.8 PG (ref 26.6–33)
MCHC RBC AUTO-ENTMCNC: 34.2 G/DL (ref 31.5–35.7)
MCV RBC AUTO: 93 FL (ref 79–97)
PLATELET # BLD AUTO: 173 10*3/MM3 (ref 140–450)
POTASSIUM SERPL-SCNC: 4.5 MMOL/L (ref 3.5–5.2)
POTASSIUM SERPL-SCNC: 6.1 MMOL/L (ref 3.5–5.2)
PSA SERPL-MCNC: 2.09 NG/ML (ref 0–4)
RBC # BLD AUTO: 5.85 10*6/MM3 (ref 4.14–5.8)
SODIUM SERPL-SCNC: 136 MMOL/L (ref 136–145)
TRIGL SERPL-MCNC: 228 MG/DL (ref 0–150)
VLDLC SERPL CALC-MCNC: 40 MG/DL (ref 5–40)
WBC # BLD AUTO: 5.93 10*3/MM3 (ref 3.4–10.8)

## 2022-10-14 PROCEDURE — 36415 COLL VENOUS BLD VENIPUNCTURE: CPT

## 2022-10-14 PROCEDURE — 84132 ASSAY OF SERUM POTASSIUM: CPT

## 2022-10-14 RX ORDER — ONDANSETRON 8 MG/1
TABLET, ORALLY DISINTEGRATING ORAL
Qty: 30 TABLET | Refills: 0 | Status: SHIPPED | OUTPATIENT
Start: 2022-10-14 | End: 2022-12-12

## 2022-10-14 NOTE — TELEPHONE ENCOUNTER
----- Message from Shabnam Zimmer MD sent at 10/14/2022  7:49 AM EDT -----  Please call the patient regarding his abnormal result.  Tell him hemoglobin has come down to 18.6, so I do recommend he do 1 more phlebotomy.  And we can repeat the CBC after that.  More importantly, his potassium was 6.1.  I need him to come in today and get that done, the sooner the better so I can get the result before the end of day.  Cholesterol looks okay except triglycerides were up.  PSA was within normal limits.

## 2022-10-26 LAB
6MAM SAL CFM-MCNC: NOT DETECTED NG/ML
6MAM SAL QL CFM: NEGATIVE
ALPRAZ SAL CFM-MCNC: NOT DETECTED NG/ML
AMPHET SAL CFM-MCNC: NOT DETECTED NG/ML
AMPHET SAL QL CFM: NEGATIVE
ANTICONVULSANTS SAL QL CFM: NEGATIVE
BENZODIAZ SAL QL CFM: ABNORMAL
BUPRENORPHINE SAL CFM-MCNC: NOT DETECTED NG/ML
BUPRENORPHINE SAL QL CFM: NEGATIVE
BZE SAL CFM-MCNC: NOT DETECTED NG/ML
CANNABINOIDS SAL QL SCN: NEGATIVE
CARBOXYTHC SAL CFM-MCNC: NOT DETECTED NG/ML
CARISOPRODOL SAL CFM-MCNC: NOT DETECTED NG/ML
CLONAZEPAM SAL CFM-MCNC: NOT DETECTED NG/ML
COC+MET SAL QL CFM: NEGATIVE
COCAINE SAL CFM-MCNC: NOT DETECTED NG/ML
CODEINE SAL CFM-MCNC: NOT DETECTED NG/ML
COTININE SAL CFM-MCNC: NOT DETECTED NG/ML
COTININE SAL QL CFM: NEGATIVE
CYCLOBENZAPRINE SAL CFM-MCNC: NOT DETECTED NG/ML
DHC SAL CFM-MCNC: NOT DETECTED NG/ML
DIAZEPAM SAL CFM-MCNC: 8.1 NG/ML
DULOXETINE SAL CFM-MCNC: NOT DETECTED NG/ML
DULOXETINE SAL QL CFM: NEGATIVE
EDDP SAL QL CFM: NOT DETECTED NG/ML
FENTANYL SAL CFM-MCNC: NEGATIVE NG/ML
FENTANYL SAL QL SCN: NOT DETECTED NG/ML
FLUNITRAZEPAM SAL CFM-MCNC: NOT DETECTED NG/ML
FLURAZEPAM SAL CFM-MCNC: NOT DETECTED NG/ML
GABAPENTIN SAL CFM-MCNC: NOT DETECTED UG/ML
HYDROCODONE SAL CFM-MCNC: NOT DETECTED NG/ML
HYDROMORPHONE SAL CFM-MCNC: NOT DETECTED NG/ML
HYPNOTICS SAL QL CFM: NEGATIVE
LORAZEPAM SAL-MCNC: NOT DETECTED NG/ML
MDMA SAL CFM-MCNC: NOT DETECTED NG/ML
MEPERIDINE SAL CFM-MCNC: NOT DETECTED NG/ML
MEPROBAMATE SAL CFM-MCNC: NOT DETECTED NG/ML
METHADONE SAL CFM-MCNC: NOT DETECTED NG/ML
METHADONE SAL QL CFM: NEGATIVE
METHAMPHET SAL CFM-MCNC: NOT DETECTED NG/ML
MIDAZOLAM SAL CFM-MCNC: NOT DETECTED NG/ML
MORPHINE SAL CFM-MCNC: NOT DETECTED NG/ML
MUSCLE RELAXANTS: NEGATIVE
NARCOTICS SAL QL CFM: NEGATIVE
NORBUPRENORPHINE SAL CFM-MCNC: NOT DETECTED NG/ML
NORDIAZEPAM SAL-MCNC: 10.5 NG/ML
NORHYDROCODONE SAL CFM-MCNC: NOT DETECTED NG/ML
NOROXYCODONE SAL CFM-MCNC: 3.4 NG/ML
OPIATES SAL QL CFM: NEGATIVE
OXAZEPAM SAL CFM-MCNC: 0.7 NG/ML
OXYCODONE SAL CFM-MCNC: 17.2 NG/ML
OXYCODONE SAL QL CFM: ABNORMAL
OXYMORPHONE SAL CFM-MCNC: NOT DETECTED NG/ML
PCP SAL CFM-MCNC: NOT DETECTED NG/ML
PCP SAL QL CFM: NEGATIVE
PREGABALIN SAL CFM-MCNC: NOT DETECTED UG/ML
PROPOXYPH SAL CFM-MCNC: NOT DETECTED NG/ML
TAPENTADOL SAL CFM-MCNC: NOT DETECTED NG/ML
TAPENTADOL SAL QL SCN: NEGATIVE
TEMAZEPAM SAL CFM-MCNC: NOT DETECTED NG/ML
TRAMADOL SAL CFM-MCNC: NOT DETECTED NG/ML
TRIAZOLAM SAL CFM-MCNC: NOT DETECTED NG/ML
ZOLPIDEM SAL CFM-MCNC: NOT DETECTED NG/ML

## 2022-10-28 ENCOUNTER — OFFICE VISIT (OUTPATIENT)
Dept: SLEEP MEDICINE | Facility: CLINIC | Age: 57
End: 2022-10-28

## 2022-10-28 VITALS
BODY MASS INDEX: 31.56 KG/M2 | HEART RATE: 68 BPM | OXYGEN SATURATION: 97 % | WEIGHT: 225.4 LBS | HEIGHT: 71 IN | SYSTOLIC BLOOD PRESSURE: 172 MMHG | DIASTOLIC BLOOD PRESSURE: 75 MMHG

## 2022-10-28 DIAGNOSIS — R06.83 SNORING: Primary | ICD-10-CM

## 2022-10-28 DIAGNOSIS — R29.818 SUSPECTED SLEEP APNEA: ICD-10-CM

## 2022-10-28 DIAGNOSIS — G47.33 OBSTRUCTIVE SLEEP APNEA (ADULT) (PEDIATRIC): ICD-10-CM

## 2022-10-28 DIAGNOSIS — D75.1 ERYTHROCYTOSIS: ICD-10-CM

## 2022-10-28 PROCEDURE — 99213 OFFICE O/P EST LOW 20 MIN: CPT | Performed by: NURSE PRACTITIONER

## 2022-10-28 RX ORDER — FAMOTIDINE 20 MG/1
20 TABLET, FILM COATED ORAL
COMMUNITY

## 2022-10-28 RX ORDER — FLUTICASONE PROPIONATE 50 MCG
2 SPRAY, SUSPENSION (ML) NASAL
COMMUNITY
Start: 2022-07-12 | End: 2022-11-29

## 2022-10-28 RX ORDER — NEBIVOLOL 10 MG/1
TABLET ORAL
COMMUNITY
Start: 2022-09-03
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
## 2022-10-31 ENCOUNTER — OFFICE VISIT (OUTPATIENT)
Dept: INTERNAL MEDICINE | Facility: CLINIC | Age: 57
End: 2022-10-31

## 2022-10-31 ENCOUNTER — LAB (OUTPATIENT)
Dept: LAB | Facility: HOSPITAL | Age: 57
End: 2022-10-31

## 2022-10-31 VITALS
OXYGEN SATURATION: 98 % | RESPIRATION RATE: 18 BRPM | SYSTOLIC BLOOD PRESSURE: 132 MMHG | BODY MASS INDEX: 31.36 KG/M2 | WEIGHT: 224 LBS | HEIGHT: 71 IN | HEART RATE: 62 BPM | DIASTOLIC BLOOD PRESSURE: 78 MMHG

## 2022-10-31 DIAGNOSIS — E87.5 HYPERKALEMIA: Primary | ICD-10-CM

## 2022-10-31 DIAGNOSIS — M25.552 CHRONIC LEFT HIP PAIN: ICD-10-CM

## 2022-10-31 DIAGNOSIS — G89.29 CHRONIC LEFT HIP PAIN: ICD-10-CM

## 2022-10-31 DIAGNOSIS — D75.1 ERYTHROCYTOSIS: ICD-10-CM

## 2022-10-31 PROCEDURE — 99214 OFFICE O/P EST MOD 30 MIN: CPT | Performed by: INTERNAL MEDICINE

## 2022-11-01 LAB
BUN SERPL-MCNC: 21 MG/DL (ref 6–24)
BUN/CREAT SERPL: 20 (ref 9–20)
CALCIUM SERPL-MCNC: 10.1 MG/DL (ref 8.7–10.2)
CHLORIDE SERPL-SCNC: 98 MMOL/L (ref 96–106)
CO2 SERPL-SCNC: 26 MMOL/L (ref 20–29)
CREAT SERPL-MCNC: 1.05 MG/DL (ref 0.76–1.27)
EGFRCR SERPLBLD CKD-EPI 2021: 82 ML/MIN/1.73
ERYTHROCYTE [DISTWIDTH] IN BLOOD BY AUTOMATED COUNT: 12.4 % (ref 11.6–15.4)
GLUCOSE SERPL-MCNC: 102 MG/DL (ref 70–99)
HCT VFR BLD AUTO: 51.7 % (ref 37.5–51)
HGB BLD-MCNC: 17.8 G/DL (ref 13–17.7)
MCH RBC QN AUTO: 31.2 PG (ref 26.6–33)
MCHC RBC AUTO-ENTMCNC: 34.4 G/DL (ref 31.5–35.7)
MCV RBC AUTO: 91 FL (ref 79–97)
PLATELET # BLD AUTO: 208 X10E3/UL (ref 150–450)
POTASSIUM SERPL-SCNC: 4.7 MMOL/L (ref 3.5–5.2)
RBC # BLD AUTO: 5.71 X10E6/UL (ref 4.14–5.8)
SODIUM SERPL-SCNC: 138 MMOL/L (ref 134–144)
WBC # BLD AUTO: 6.6 X10E3/UL (ref 3.4–10.8)

## 2022-11-01 NOTE — PROGRESS NOTES
"Subjective   Raj Kuhn is a 57 y.o. male here for follow-up hyperkalemia, erythrocytosis on labs.  Hemoglobin was coming down at last check after he stopped testosterone and potassium was normal.  He would like to get those rechecked again today just to ensure that they are still normal as his left hip arthroplasty is scheduled for November.  He is considering holding off on this, he says he has never been more nervous about his surgery.  He did have prior orthopedic surgery at  with Dr. Morales and he is a longtime patient of Dr. Sandoval in [place]. Hip is painful and affects his gait.    I have reviewed the following portions of the patient's history and confirmed they are accurate: current medications, past medical history, past social history, past surgical history and problem list     I have personally reviewed and performed the ROS. Shabnam Zimmer MD     Review of Systems:  General: negative  Heme: Negative  MSK: Hip pain    Objective   /78   Pulse 62   Resp 18   Ht 180.3 cm (70.98\")   Wt 102 kg (224 lb)   SpO2 98%   BMI 31.26 kg/m²     Physical Exam  Vitals reviewed.   Constitutional:       Appearance: He is well-developed.   Pulmonary:      Effort: Pulmonary effort is normal.   Skin:     General: Skin is warm and dry.   Neurological:      Mental Status: He is alert and oriented to person, place, and time.   Psychiatric:         Behavior: Behavior normal.         Thought Content: Thought content normal.         Judgment: Judgment normal.         Assessment & Plan   Diagnoses and all orders for this visit:    1. Hyperkalemia (Primary)  -     Basic Metabolic Panel  -Resolved on last visit, suspect it was lab error.  We will recheck again today just to ensure that result.  -Reviewed serial potassium levels as well as BMPs    2. Erythrocytosis  -     CBC (No Diff)  -Improving after d/c testosterone    3. Chronic left hip pain  -Scheduled for left hip arthroplasty with Dr. Krishnamurthy at " [facility], patient considering holding off on this for a while but he will ultimately make the decision.  He is officially cleared for surgery now that his labs have normalized (reviewed 11/1)  -Reviewed Ortho note           Shabnam Zimmer MD

## 2022-11-16 DIAGNOSIS — F41.9 ANXIETY: ICD-10-CM

## 2022-11-17 RX ORDER — DIAZEPAM 10 MG/1
10 TABLET ORAL EVERY 8 HOURS PRN
Qty: 90 TABLET | Refills: 2 | Status: SHIPPED | OUTPATIENT
Start: 2022-11-17 | End: 2023-02-16 | Stop reason: SDUPTHER

## 2022-11-26 DIAGNOSIS — K59.03 DRUG-INDUCED CONSTIPATION: ICD-10-CM

## 2022-11-28 RX ORDER — DOCUSATE SODIUM 250 MG
CAPSULE ORAL
Qty: 90 CAPSULE | Refills: 0 | Status: SHIPPED | OUTPATIENT
Start: 2022-11-28 | End: 2022-12-28

## 2022-11-29 ENCOUNTER — LAB (OUTPATIENT)
Dept: LAB | Facility: HOSPITAL | Age: 57
End: 2022-11-29

## 2022-11-29 ENCOUNTER — OFFICE VISIT (OUTPATIENT)
Dept: INTERNAL MEDICINE | Facility: CLINIC | Age: 57
End: 2022-11-29

## 2022-11-29 VITALS
WEIGHT: 226 LBS | TEMPERATURE: 98.2 F | HEIGHT: 71 IN | BODY MASS INDEX: 31.64 KG/M2 | HEART RATE: 69 BPM | SYSTOLIC BLOOD PRESSURE: 162 MMHG | OXYGEN SATURATION: 98 % | DIASTOLIC BLOOD PRESSURE: 99 MMHG

## 2022-11-29 DIAGNOSIS — I10 BENIGN ESSENTIAL HTN: Primary | ICD-10-CM

## 2022-11-29 DIAGNOSIS — R00.1 BRADYCARDIA: ICD-10-CM

## 2022-11-29 DIAGNOSIS — Z79.899 MEDICATION MANAGEMENT: ICD-10-CM

## 2022-11-29 PROCEDURE — 99214 OFFICE O/P EST MOD 30 MIN: CPT | Performed by: INTERNAL MEDICINE

## 2022-11-30 PROCEDURE — 93000 ELECTROCARDIOGRAM COMPLETE: CPT | Performed by: INTERNAL MEDICINE

## 2022-11-30 NOTE — PROGRESS NOTES
"Aime Kuhn is a 57 y.o. male here for low heart rate that he noticed on his watch recently.  His normal resting heart rate is around 50-60.  He says he was resting recently and his heart rate got down to 29.  He was asymptomatic at the time.  It happened for 2 nights in a row but has not happened since.  He denies any palpitations or chest pain.  His blood pressure is elevated today but usually well controlled.  He was late today and was rushing around and feels a bit anxious.  He is going to reschedule his hip surgery so would like to make sure his heart is in good condition.  He has no heart history.  No shortness of breath.  He was working out regularly and had no issue.  No SCOTT.    I have reviewed the following portions of the patient's history and confirmed they are accurate: current medications, past medical history, past social history and problem list     I have personally reviewed and performed the ROS. Shabnam Zimmer MD     Review of Systems:  General: negative  CV: See HPI  Respiratory: negative  Neuro: negative    Objective   /99   Pulse 69   Temp 98.2 °F (36.8 °C)   Ht 180.3 cm (70.98\")   Wt 103 kg (226 lb)   SpO2 98%   BMI 31.54 kg/m²     Physical Exam  Vitals reviewed.   Constitutional:       Appearance: He is well-developed.   Cardiovascular:      Rate and Rhythm: Normal rate and regular rhythm.      Heart sounds: Normal heart sounds.   Pulmonary:      Effort: Pulmonary effort is normal.      Breath sounds: Normal breath sounds. No wheezing or rales.   Skin:     General: Skin is warm and dry.   Neurological:      Mental Status: He is alert and oriented to person, place, and time.   Psychiatric:         Behavior: Behavior normal.         Thought Content: Thought content normal.       ECG 12 Lead    Date/Time: 11/30/2022 11:35 AM  Performed by: Shabnam Zimmer MD  Authorized by: Shabnam Zimmer MD   Comparison: compared with previous ECG " from 1/16/2020  Rhythm: sinus rhythm  Rate: normal  Conduction: conduction normal  QRS axis: normal  Other: no other findings    Clinical impression: normal ECG              Assessment & Plan   Diagnoses and all orders for this visit:    1. Benign essential HTN (Primary)  -Quite elevated today, have asked patient to keep a close watch on his BP at home and report if it remains elevated.  It had previously been well controlled so I think some component of it today is whitecoat hypertension/anxiety about health    2. Bradycardia  -     ECG 12 Lead  -Patient has a low resting heart rate at baseline and is also on beta-blocker.  He is not symptomatic with bradycardia.  Explained to patient that as long as he is not symptomatic this is not anything that we need to worry about.  Discussed what symptoms to look out for in relation to bradycardia    3. Medication management  -     Compliance Drug Analysis, Ur - Urine, Clean Catch    Other orders  -     SCANNED EKG             Shabnam Zimmer MD

## 2022-12-03 DIAGNOSIS — A60.01 HERPES SIMPLEX INFECTION OF PENIS: ICD-10-CM

## 2022-12-05 LAB — DRUGS UR: NORMAL

## 2022-12-05 RX ORDER — VALACYCLOVIR HYDROCHLORIDE 1 G/1
TABLET, FILM COATED ORAL
Qty: 30 TABLET | Refills: 0 | Status: SHIPPED | OUTPATIENT
Start: 2022-12-05 | End: 2023-01-16

## 2022-12-09 ENCOUNTER — PATIENT MESSAGE (OUTPATIENT)
Dept: INTERNAL MEDICINE | Facility: CLINIC | Age: 57
End: 2022-12-09

## 2022-12-09 ENCOUNTER — TELEPHONE (OUTPATIENT)
Dept: INTERNAL MEDICINE | Facility: CLINIC | Age: 57
End: 2022-12-09

## 2022-12-09 NOTE — TELEPHONE ENCOUNTER
HUB TO READ MESSAGE BELOW  ----- Message from Shabnam Zimmer MD sent at 12/8/2022 10:38 AM EST -----  Please call the patient, when he took this test he was again negative for hydrocodone, as he was last month. Let him explain. Otherwise it was appropriate.

## 2022-12-09 NOTE — TELEPHONE ENCOUNTER
PT called to return a phone call to the office. PT was read the message regarding lab results from Dr. Zimmer as directed. PT states that he does not take hydrocodone and stated that he was very confused about the message and is requesting a call back to discuss further.

## 2022-12-10 DIAGNOSIS — R11.0 NAUSEA: ICD-10-CM

## 2022-12-12 RX ORDER — ONDANSETRON 8 MG/1
TABLET, ORALLY DISINTEGRATING ORAL
Qty: 30 TABLET | Refills: 0 | Status: SHIPPED | OUTPATIENT
Start: 2022-12-12 | End: 2023-02-27

## 2022-12-14 DIAGNOSIS — F51.01 PRIMARY INSOMNIA: ICD-10-CM

## 2022-12-14 RX ORDER — ZOLPIDEM TARTRATE 5 MG/1
TABLET ORAL
Qty: 30 TABLET | Refills: 2 | Status: SHIPPED | OUTPATIENT
Start: 2022-12-14 | End: 2023-03-15

## 2022-12-14 RX ORDER — FAMOTIDINE 20 MG/1
TABLET, FILM COATED ORAL
Qty: 60 TABLET | Refills: 0 | Status: SHIPPED | OUTPATIENT
Start: 2022-12-14 | End: 2023-02-28 | Stop reason: SDUPTHER

## 2022-12-28 DIAGNOSIS — K59.03 DRUG-INDUCED CONSTIPATION: ICD-10-CM

## 2022-12-28 RX ORDER — DOCUSATE SODIUM 250 MG
CAPSULE ORAL
Qty: 90 CAPSULE | Refills: 3 | Status: SHIPPED | OUTPATIENT
Start: 2022-12-28

## 2023-01-06 DIAGNOSIS — Z12.11 SCREENING FOR COLON CANCER: Primary | ICD-10-CM

## 2023-01-09 ENCOUNTER — APPOINTMENT (OUTPATIENT)
Dept: SLEEP MEDICINE | Facility: HOSPITAL | Age: 58
End: 2023-01-09
Payer: MEDICARE

## 2023-01-13 DIAGNOSIS — A60.01 HERPES SIMPLEX INFECTION OF PENIS: ICD-10-CM

## 2023-01-16 RX ORDER — VALACYCLOVIR HYDROCHLORIDE 1 G/1
TABLET, FILM COATED ORAL
Qty: 30 TABLET | Refills: 5 | Status: SHIPPED | OUTPATIENT
Start: 2023-01-16

## 2023-02-08 DIAGNOSIS — Z01.818 PREOP EXAMINATION: Primary | ICD-10-CM

## 2023-02-16 DIAGNOSIS — F41.9 ANXIETY: ICD-10-CM

## 2023-02-17 DIAGNOSIS — F41.9 ANXIETY: ICD-10-CM

## 2023-02-17 RX ORDER — DIAZEPAM 10 MG/1
TABLET ORAL
Qty: 90 TABLET | Refills: 0 | OUTPATIENT
Start: 2023-02-17

## 2023-02-17 RX ORDER — DIAZEPAM 10 MG/1
10 TABLET ORAL EVERY 8 HOURS PRN
Qty: 90 TABLET | Refills: 2 | Status: SHIPPED | OUTPATIENT
Start: 2023-02-17

## 2023-02-24 DIAGNOSIS — R11.0 NAUSEA: ICD-10-CM

## 2023-02-27 RX ORDER — ONDANSETRON 8 MG/1
TABLET, ORALLY DISINTEGRATING ORAL
Qty: 30 TABLET | Refills: 0 | Status: SHIPPED | OUTPATIENT
Start: 2023-02-27 | End: 2023-02-28 | Stop reason: SDUPTHER

## 2023-02-28 DIAGNOSIS — F32.A ANXIETY AND DEPRESSION: ICD-10-CM

## 2023-02-28 DIAGNOSIS — I10 BENIGN ESSENTIAL HTN: ICD-10-CM

## 2023-02-28 DIAGNOSIS — R11.0 NAUSEA: ICD-10-CM

## 2023-02-28 DIAGNOSIS — K21.9 GASTROESOPHAGEAL REFLUX DISEASE WITHOUT ESOPHAGITIS: ICD-10-CM

## 2023-02-28 DIAGNOSIS — F41.9 ANXIETY AND DEPRESSION: ICD-10-CM

## 2023-02-28 RX ORDER — METHOCARBAMOL 750 MG/1
750 TABLET, FILM COATED ORAL 4 TIMES DAILY PRN
Qty: 360 TABLET | Refills: 1 | Status: SHIPPED | OUTPATIENT
Start: 2023-02-28

## 2023-02-28 RX ORDER — LOSARTAN POTASSIUM 100 MG/1
100 TABLET ORAL DAILY
Qty: 30 TABLET | Refills: 5 | Status: SHIPPED | OUTPATIENT
Start: 2023-02-28

## 2023-02-28 RX ORDER — FAMOTIDINE 20 MG/1
20 TABLET, FILM COATED ORAL 2 TIMES DAILY
Qty: 60 TABLET | Refills: 0 | Status: SHIPPED | OUTPATIENT
Start: 2023-02-28

## 2023-02-28 RX ORDER — ONDANSETRON 8 MG/1
8 TABLET, ORALLY DISINTEGRATING ORAL EVERY 8 HOURS PRN
Qty: 30 TABLET | Refills: 0 | Status: SHIPPED | OUTPATIENT
Start: 2023-02-28

## 2023-02-28 RX ORDER — ESOMEPRAZOLE MAGNESIUM 40 MG/1
40 CAPSULE, DELAYED RELEASE ORAL
Qty: 30 CAPSULE | Refills: 5 | Status: SHIPPED | OUTPATIENT
Start: 2023-02-28

## 2023-02-28 RX ORDER — ESCITALOPRAM OXALATE 20 MG/1
20 TABLET ORAL DAILY
Qty: 90 TABLET | Refills: 3 | Status: SHIPPED | OUTPATIENT
Start: 2023-02-28

## 2023-03-15 DIAGNOSIS — F51.01 PRIMARY INSOMNIA: ICD-10-CM

## 2023-03-15 RX ORDER — ZOLPIDEM TARTRATE 5 MG/1
TABLET ORAL
Qty: 30 TABLET | Refills: 5 | Status: SHIPPED | OUTPATIENT
Start: 2023-03-15

## 2023-05-10 RX ORDER — FAMOTIDINE 20 MG/1
TABLET, FILM COATED ORAL
Qty: 60 TABLET | Refills: 0 | Status: SHIPPED | OUTPATIENT
Start: 2023-05-10

## 2023-05-15 DIAGNOSIS — R11.0 NAUSEA: ICD-10-CM

## 2023-05-15 DIAGNOSIS — F41.9 ANXIETY: ICD-10-CM

## 2023-05-15 RX ORDER — ONDANSETRON 8 MG/1
TABLET, ORALLY DISINTEGRATING ORAL
Qty: 30 TABLET | Refills: 0 | Status: SHIPPED | OUTPATIENT
Start: 2023-05-15

## 2023-05-15 RX ORDER — DIAZEPAM 10 MG/1
TABLET ORAL
Qty: 90 TABLET | Refills: 0 | Status: SHIPPED | OUTPATIENT
Start: 2023-05-15

## 2023-05-24 ENCOUNTER — OFFICE VISIT (OUTPATIENT)
Dept: INTERNAL MEDICINE | Facility: CLINIC | Age: 58
End: 2023-05-24
Payer: MEDICARE

## 2023-05-24 ENCOUNTER — LAB (OUTPATIENT)
Dept: LAB | Facility: HOSPITAL | Age: 58
End: 2023-05-24
Payer: MEDICARE

## 2023-05-24 VITALS
WEIGHT: 235 LBS | SYSTOLIC BLOOD PRESSURE: 152 MMHG | HEIGHT: 71 IN | OXYGEN SATURATION: 95 % | BODY MASS INDEX: 32.9 KG/M2 | DIASTOLIC BLOOD PRESSURE: 90 MMHG | HEART RATE: 56 BPM

## 2023-05-24 DIAGNOSIS — I10 BENIGN ESSENTIAL HTN: ICD-10-CM

## 2023-05-24 DIAGNOSIS — M25.561 CHRONIC KNEE PAIN AFTER TOTAL REPLACEMENT OF RIGHT KNEE JOINT: ICD-10-CM

## 2023-05-24 DIAGNOSIS — G89.29 CHRONIC KNEE PAIN AFTER TOTAL REPLACEMENT OF RIGHT KNEE JOINT: ICD-10-CM

## 2023-05-24 DIAGNOSIS — Z12.83 SKIN CANCER SCREENING: ICD-10-CM

## 2023-05-24 DIAGNOSIS — Z96.651 CHRONIC KNEE PAIN AFTER TOTAL REPLACEMENT OF RIGHT KNEE JOINT: ICD-10-CM

## 2023-05-24 DIAGNOSIS — Z00.00 MEDICARE ANNUAL WELLNESS VISIT, SUBSEQUENT: Primary | ICD-10-CM

## 2023-05-24 NOTE — PROGRESS NOTES
The ABCs of the Annual Wellness Visit  Subsequent Medicare Wellness Visit    Chief Complaint   Patient presents with   • Medicare Wellness-subsequent      Subjective    History of Present Illness:  Raj Kuhn is a 57 y.o. male who presents for a Subsequent Medicare Wellness Visit.    The following portions of the patient's history were reviewed and   updated as appropriate: allergies, current medications, past medical history, past social history, past surgical history and problem list.     Compared to one year ago, the patient feels his physical   health is the same.    Compared to one year ago, the patient feels his mental   health is the same.    Recent Hospitalizations:  He was admitted within the past 365 days at [facility].       Current Medical Providers:  Patient Care Team:  Shabnam Zimmer MD as PCP - General (Internal Medicine)    Outpatient Medications Prior to Visit   Medication Sig Dispense Refill   • aspirin 81 MG chewable tablet Chew 1 tablet Daily.     • clindamycin (CLEOCIN T) 1 % external solution APPLY EXTERNALLY TO AFFECTED AREA TWO TIMES A DAY 60 mL 3   • diazePAM (VALIUM) 10 MG tablet TAKE 1 TABLET BY MOUTH EVERY 8 HOURS AS NEEDED FOR ANXIETY 90 tablet 0   • docusate sodium (COLACE) 250 MG capsule TAKE 1 CAPSULE BY MOUTH EVERY DAY 90 capsule 3   • escitalopram (LEXAPRO) 20 MG tablet Take 1 tablet by mouth Daily. 553256 90 tablet 3   • esomeprazole (nexIUM) 40 MG capsule Take 1 capsule by mouth Every Morning Before Breakfast. 30 capsule 5   • famotidine (PEPCID) 20 MG tablet TAKE 1 TABLET BY MOUTH 2 TIMES A DAY 60 tablet 0   • fluticasone (Flonase) 50 MCG/ACT nasal spray 2 sprays into the nostril(s) as directed by provider Daily. 15.8 mL 0   • gabapentin (NEURONTIN) 600 MG tablet Take 2 tablets by mouth 3 (Three) Times a Day. (Patient taking differently: Take 1 tablet by mouth 3 (Three) Times a Day.) 60 tablet 2   • hydroCHLOROthiazide (HYDRODIURIL) 25 MG  tablet Take 0.5 tablets by mouth Daily. 200001 90 tablet 2   • Ketoconazole 2 % gel Use twice daily over affected areas. 45 g 0   • losartan (COZAAR) 100 MG tablet Take 1 tablet by mouth Daily. 30 tablet 5   • methocarbamol (ROBAXIN) 750 MG tablet Take 1 tablet by mouth 4 (Four) Times a Day As Needed for Muscle Spasms. 360 tablet 1   • nebivolol (Bystolic) 10 MG tablet Take 0.5 tablets by mouth Daily. 200001 90 tablet 3   • ondansetron ODT (ZOFRAN-ODT) 8 MG disintegrating tablet DISSOLVE 1 TABLET IN MOUTH EVERY 8 HOURS AS NEEDED FOR NAUSEA AND VOMITING 30 tablet 0   • oxyCODONE-acetaminophen (PERCOCET)  MG per tablet Take 1 tablet by mouth 3 (Three) Times a Day.     • Sod Picosulfate-Mag Ox-Cit Acd 10-3.5-12 MG-GM -GM/160ML solution Take 160 mL by mouth Take As Directed for 2 doses. 320 mL 0   • tadalafil (Cialis) 20 MG tablet Take 1 tablet by mouth Daily As Needed for Erectile Dysfunction. 30 tablet 2   • valACYclovir (VALTREX) 1000 MG tablet TAKE 1 TABLET BY MOUTH EVERY DAY 30 tablet 5   • zolpidem (AMBIEN) 5 MG tablet TAKE 1 TABLET BY MOUTH EVERY DAY AT NIGHT AS NEEDED FOR SLEEP 30 tablet 5   • bisacodyl (DULCOLAX) 5 MG EC tablet Take 4 tabs 1 hr before starting bowel prep for colonoscopy (Patient not taking: Reported on 5/24/2023)     • famotidine (Pepcid) 20 MG tablet Take 1 tablet by mouth 2 (Two) Times a Day. (Patient not taking: Reported on 5/24/2023) 180 tablet 3   • famotidine (PEPCID) 20 MG tablet Take 1 tablet by mouth. (Patient not taking: Reported on 5/24/2023)     • nebivolol (BYSTOLIC) 10 MG tablet  (Patient not taking: Reported on 5/24/2023)       No facility-administered medications prior to visit.       Opioid medication/s are on active medication list.  and I have evaluated his active treatment plan and pain score trends (see table).  There were no vitals filed for this visit.  I have reviewed the chart for potential of high risk medication and harmful drug interactions in the  "elderly.            Aspirin is on active medication list. Aspirin use is indicated based on review of current medical condition/s. Pros and cons of this therapy have been discussed today. Benefits of this medication outweigh potential harm.  Patient has been encouraged to continue taking this medication.  .      Patient Active Problem List   Diagnosis   • Benign essential HTN   • Anxiety and depression   • Gastroesophageal reflux disease without esophagitis   • Chronic joint pain   • Panic attack   • Morbidly obese     Advance Care Planning   Advance Directive is not on file.  ACP discussion was declined by the patient. Patient does not have an advance directive, declines further assistance.    Review of Systems      Objective       Vitals:    23 1057   BP: 152/90   BP Location: Left arm   Pulse: 56   SpO2: 95%   Weight: 107 kg (235 lb)   Height: 180.3 cm (71\")     BMI Readings from Last 1 Encounters:   23 32.78 kg/m²   BMI is above normal parameters. Recommendations include: exercise counseling    Does the patient have evidence of cognitive impairment? No    Physical Exam            HEALTH RISK ASSESSMENT    Smoking Status:  Social History     Tobacco Use   Smoking Status Never   • Passive exposure: Never   Smokeless Tobacco Never   Tobacco Comments    no use ever     Alcohol Consumption:  Social History     Substance and Sexual Activity   Alcohol Use Yes   • Alcohol/week: 3.0 - 4.0 standard drinks   • Types: 3 - 4 Cans of beer per week    Comment: occ     Fall Risk Screen:    PO Fall Risk Assessment was completed, and patient is at LOW risk for falls.Assessment completed on:2023    Depression Screenin/24/2023    10:00 AM   PHQ-2/PHQ-9 Depression Screening   Little Interest or Pleasure in Doing Things 1-->several days   Feeling Down, Depressed or Hopeless 1-->several days   Trouble Falling or Staying Asleep, or Sleeping Too Much 0-->not at all   Feeling Tired or Having Little Energy " 1-->several days   Poor Appetite or Overeating 0-->not at all   Feeling Bad about Yourself - or that You are a Failure or Have Let Yourself or Your Family Down 1-->several days   Trouble Concentrating on Things, Such as Reading the Newspaper or Watching Television 0-->not at all   Moving or Speaking So Slowly that Other People Could Have Noticed? Or the Opposite - Being So Fidgety 0-->not at all   Thoughts that You Would be Better Off Dead or of Hurting Yourself in Some Way 0-->not at all   PHQ-9: Brief Depression Severity Measure Score 4   If You Checked Off Any Problems, How Difficult Have These Problems Made It For You to Do Your Work, Take Care of Things at Home, or Get Along with Other People? not difficult at all       Health Habits and Functional and Cognitive Screenin/24/2023    10:00 AM   Functional & Cognitive Status   Do you have difficulty preparing food and eating? No   Do you have difficulty bathing yourself, getting dressed or grooming yourself? No   Do you have difficulty using the toilet? No   Do you have difficulty moving around from place to place? Yes   Do you have trouble with steps or getting out of a bed or a chair? Yes   Current Diet Well Balanced Diet   Dental Exam Up to date   Eye Exam Up to date   Exercise (times per week) 6 times per week   Current Exercises Include Walking   Do you need help using the phone?  No   Are you deaf or do you have serious difficulty hearing?  No   Do you need help with transportation? No   Do you need help shopping? Yes   Do you need help preparing meals?  No   Do you need help with housework?  Yes   Do you need help with laundry? No   Do you need help taking your medications? No   Do you ever drive or ride in a car without wearing a seat belt? No   Have you felt unusual stress, anger or loneliness in the last month? Yes   Who do you live with? Alone   If you need help, do you have trouble finding someone available to you? Yes   Have you been bothered  in the last four weeks by sexual problems? Yes   Do you have difficulty concentrating, remembering or making decisions? No       Age-appropriate Screening Schedule:  Refer to the list below for future screening recommendations based on patient's age, sex and/or medical conditions. Orders for these recommended tests are listed in the plan section. The patient has been provided with a written plan.    Health Maintenance   Topic Date Due   • TDAP/TD VACCINES (1 - Tdap) Never done   • COVID-19 Vaccine (2 - Pfizer series) 06/18/2021   • ANNUAL WELLNESS VISIT  12/03/2022   • COLORECTAL CANCER SCREENING  06/15/2023 (Originally 1965)   • INFLUENZA VACCINE  08/01/2023   • LIPID PANEL  10/12/2023   • HEPATITIS C SCREENING  Completed   • ZOSTER VACCINE  Completed   • Pneumococcal Vaccine 0-64  Aged Out              Assessment & Plan     CMS Preventative Services Quick Reference  Risk Factors Identified During Encounter  Chronic Pain: sees pain mgmt  Immunizations Discussed/Encouraged: Tdap, Influenza, Shingrix and COVID19  Polypharmacy: Medication List reviewed  The above risks/problems have been discussed with the patient.  Follow up actions/plans if indicated are seen below in the Assessment/Plan Section.  Pertinent information has been shared with the patient in the After Visit Summary.    Diagnoses and all orders for this visit:    1. Medicare annual wellness visit, subsequent (Primary)  -rec tdap at pharmacy  -[facility]    2. Chronic knee pain after total replacement of right knee joint  -     Ambulatory Referral to Orthopedic Surgery  -     Urine Drug Screen - Urine, Clean Catch; Future    3. Benign essential HTN  -elevated today. Will have pt take daily readings over the next week and let me know values and if necessary will add another med    4. Skin cancer screening  -     Ambulatory Referral to Dermatology        Follow Up:   No follow-ups on file.     An After Visit Summary and PPPS were given to the  patient.

## 2023-05-31 LAB — DRUGS UR: NORMAL

## 2023-06-02 ENCOUNTER — TELEPHONE (OUTPATIENT)
Dept: GASTROENTEROLOGY | Facility: CLINIC | Age: 58
End: 2023-06-02

## 2023-06-02 NOTE — TELEPHONE ENCOUNTER
PATIENT CALLED OFFICE. STATED HE JUST RECENTLY HAD HIP SURGERY. PER HIS SURGEON, PATIENT WILL NEED ANTIBIOTICS FOR HIS COLONOSCOPY ON 10/05/23. HOW TO PROCEED?

## 2023-06-12 RX ORDER — FAMOTIDINE 20 MG/1
TABLET, FILM COATED ORAL
Qty: 60 TABLET | Refills: 0 | Status: SHIPPED | OUTPATIENT
Start: 2023-06-12

## 2023-06-13 DIAGNOSIS — F41.9 ANXIETY: ICD-10-CM

## 2023-06-13 RX ORDER — DIAZEPAM 10 MG/1
TABLET ORAL
Qty: 90 TABLET | Refills: 2 | Status: SHIPPED | OUTPATIENT
Start: 2023-06-13

## 2023-08-30 RX ORDER — FAMOTIDINE 20 MG/1
TABLET, FILM COATED ORAL
Qty: 60 TABLET | Refills: 0 | Status: SHIPPED | OUTPATIENT
Start: 2023-08-30

## 2023-09-07 DIAGNOSIS — F41.9 ANXIETY: ICD-10-CM

## 2023-09-07 DIAGNOSIS — F51.01 PRIMARY INSOMNIA: ICD-10-CM

## 2023-09-07 RX ORDER — DIAZEPAM 10 MG/1
10 TABLET ORAL EVERY 8 HOURS PRN
Qty: 90 TABLET | Refills: 2 | Status: SHIPPED | OUTPATIENT
Start: 2023-09-07

## 2023-09-07 RX ORDER — ZOLPIDEM TARTRATE 5 MG/1
5 TABLET ORAL NIGHTLY PRN
Qty: 30 TABLET | Refills: 5 | Status: SHIPPED | OUTPATIENT
Start: 2023-09-07

## 2023-09-26 ENCOUNTER — TELEPHONE (OUTPATIENT)
Dept: GASTROENTEROLOGY | Facility: CLINIC | Age: 58
End: 2023-09-26
Payer: MEDICARE

## 2023-09-26 NOTE — TELEPHONE ENCOUNTER
----- Message from Lisa Vazquez MA sent at 9/25/2023  3:42 PM EDT -----  Regarding: ABX before and during his procedure  Pt called in and LVM. Tried to call pt back, and does not have a  set up. He called in stating that he is supposed to have abx before and during his procedure with Dr Keyes on 10/5/2023 due to a hip replacement 6mos ago.     Lisa

## 2023-09-26 NOTE — TELEPHONE ENCOUNTER
I tried to call Mr Kuhn back. No antibiotic is needed before or during the colonoscopy because it's not an invasive surgery. No answer; left voice.

## 2023-09-27 ENCOUNTER — PATIENT MESSAGE (OUTPATIENT)
Dept: GASTROENTEROLOGY | Facility: CLINIC | Age: 58
End: 2023-09-27
Payer: MEDICARE

## 2023-09-27 RX ORDER — SODIUM PICOSULFATE, MAGNESIUM OXIDE, AND ANHYDROUS CITRIC ACID 12; 3.5; 1 G/175ML; G/175ML; MG/175ML
350 LIQUID ORAL ONCE
Qty: 350 ML | Refills: 0 | Status: SHIPPED | OUTPATIENT
Start: 2023-09-27 | End: 2023-09-27

## 2023-09-27 NOTE — TELEPHONE ENCOUNTER
From: Raj Kuhn  To: Dev Keyes  Sent: 9/27/2023 2:36 PM EDT  Subject: Antibiotics for colonscopy    Dr Krishnamurthy at  Orthopedics told me to ENSURE I receive antibiotics for my colonoscopy, I understand it isn't invasive, however if he finds polyps like he did before he will remove them potentially opening me up to get an infection that could move through my bloodstream to my recently replaced hip or replaced knee or shoulder. If there are issues about this I suggest Dr Keyes consult Dr Krishnamurthy at  Ortho. I really don't desire to have my new hip removed and a cement spacer put in its place for 3+ months while all infection is killed in my body. I'm not comfortable having this procedure without antibiotics as I was instructed when I received my hip. I can be reached at [phone]. I don't typically answer number that I don't recognize, our wonderful world of spammers, scammers and robocallers. I am responding to the message you left, I had to go to [place] yesterday to see one of my other Orthopedics, I hadn't yet heard the message or I would hVe asked him also

## 2023-09-28 ENCOUNTER — TELEPHONE (OUTPATIENT)
Dept: GASTROENTEROLOGY | Facility: CLINIC | Age: 58
End: 2023-09-28
Payer: MEDICARE

## 2023-09-28 NOTE — TELEPHONE ENCOUNTER
I SPOKE WITH MR GOMEZ.  I WILL DISCUSS ANTIBIOTIC WITH DR LEIJA ON MONDAY. PATIENT VOICED UNDERSTANDING.

## 2023-10-02 NOTE — TELEPHONE ENCOUNTER
I spoke with Mr Anthony. Dr Keyes recommendation given. Patient stated he had to cancel colonoscopy due to [facility] not accepting [employer] anymore. He reschedule appointment for next year.

## 2023-10-04 DIAGNOSIS — I10 BENIGN ESSENTIAL HTN: ICD-10-CM

## 2023-10-04 DIAGNOSIS — K21.9 GASTROESOPHAGEAL REFLUX DISEASE WITHOUT ESOPHAGITIS: ICD-10-CM

## 2023-10-04 RX ORDER — ESOMEPRAZOLE MAGNESIUM 40 MG/1
CAPSULE, DELAYED RELEASE ORAL
Qty: 30 CAPSULE | Refills: 0 | Status: SHIPPED | OUTPATIENT
Start: 2023-10-04

## 2023-10-04 RX ORDER — LOSARTAN POTASSIUM 100 MG/1
TABLET ORAL
Qty: 30 TABLET | Refills: 0 | Status: SHIPPED | OUTPATIENT
Start: 2023-10-04

## 2023-10-14 DIAGNOSIS — I10 BENIGN ESSENTIAL HTN: ICD-10-CM

## 2023-10-16 RX ORDER — FAMOTIDINE 20 MG/1
TABLET, FILM COATED ORAL
Qty: 60 TABLET | Refills: 5 | Status: SHIPPED | OUTPATIENT
Start: 2023-10-16

## 2023-10-16 RX ORDER — NEBIVOLOL 10 MG/1
10 TABLET ORAL DAILY
Qty: 90 TABLET | Refills: 1 | Status: SHIPPED | OUTPATIENT
Start: 2023-10-16

## 2023-10-30 DIAGNOSIS — K21.9 GASTROESOPHAGEAL REFLUX DISEASE WITHOUT ESOPHAGITIS: ICD-10-CM

## 2023-10-30 DIAGNOSIS — I10 BENIGN ESSENTIAL HTN: ICD-10-CM

## 2023-10-30 RX ORDER — ESOMEPRAZOLE MAGNESIUM 40 MG/1
CAPSULE, DELAYED RELEASE ORAL
Qty: 30 CAPSULE | Refills: 0 | Status: SHIPPED | OUTPATIENT
Start: 2023-10-30

## 2023-10-30 RX ORDER — LOSARTAN POTASSIUM 100 MG/1
TABLET ORAL
Qty: 30 TABLET | Refills: 0 | Status: SHIPPED | OUTPATIENT
Start: 2023-10-30

## 2023-11-15 ENCOUNTER — OFFICE VISIT (OUTPATIENT)
Dept: INTERNAL MEDICINE | Facility: CLINIC | Age: 58
End: 2023-11-15
Payer: MEDICARE

## 2023-11-15 ENCOUNTER — LAB (OUTPATIENT)
Dept: INTERNAL MEDICINE | Facility: CLINIC | Age: 58
End: 2023-11-15
Payer: MEDICARE

## 2023-11-15 VITALS
WEIGHT: 241 LBS | OXYGEN SATURATION: 96 % | HEIGHT: 71 IN | BODY MASS INDEX: 33.74 KG/M2 | SYSTOLIC BLOOD PRESSURE: 142 MMHG | HEART RATE: 52 BPM | DIASTOLIC BLOOD PRESSURE: 82 MMHG

## 2023-11-15 DIAGNOSIS — Z13.29 THYROID DISORDER SCREEN: ICD-10-CM

## 2023-11-15 DIAGNOSIS — F32.A ANXIETY AND DEPRESSION: ICD-10-CM

## 2023-11-15 DIAGNOSIS — Z79.899 MEDICATION MANAGEMENT: ICD-10-CM

## 2023-11-15 DIAGNOSIS — F41.9 ANXIETY AND DEPRESSION: ICD-10-CM

## 2023-11-15 DIAGNOSIS — I10 BENIGN ESSENTIAL HTN: ICD-10-CM

## 2023-11-15 DIAGNOSIS — K80.20 CALCULUS OF GALLBLADDER WITHOUT CHOLECYSTITIS WITHOUT OBSTRUCTION: ICD-10-CM

## 2023-11-15 DIAGNOSIS — Z12.5 PROSTATE CANCER SCREENING: ICD-10-CM

## 2023-11-15 DIAGNOSIS — Z79.899 MEDICATION MANAGEMENT: Primary | ICD-10-CM

## 2023-11-15 LAB
ALBUMIN SERPL-MCNC: 4.4 G/DL (ref 3.5–5.2)
ALBUMIN/GLOB SERPL: 1.6 G/DL
ALP SERPL-CCNC: 59 U/L (ref 39–117)
ALT SERPL W P-5'-P-CCNC: 27 U/L (ref 1–41)
AMPHET+METHAMPHET UR QL: NEGATIVE
AMPHETAMINES UR QL: NEGATIVE
ANION GAP SERPL CALCULATED.3IONS-SCNC: 8.5 MMOL/L (ref 5–15)
AST SERPL-CCNC: 35 U/L (ref 1–40)
BARBITURATES UR QL SCN: NEGATIVE
BENZODIAZ UR QL SCN: POSITIVE
BILIRUB SERPL-MCNC: 0.7 MG/DL (ref 0–1.2)
BUN SERPL-MCNC: 18 MG/DL (ref 6–20)
BUN/CREAT SERPL: 15.1 (ref 7–25)
BUPRENORPHINE SERPL-MCNC: NEGATIVE NG/ML
CALCIUM SPEC-SCNC: 9.1 MG/DL (ref 8.6–10.5)
CANNABINOIDS SERPL QL: NEGATIVE
CHLORIDE SERPL-SCNC: 96 MMOL/L (ref 98–107)
CHOLEST SERPL-MCNC: 179 MG/DL (ref 0–200)
CO2 SERPL-SCNC: 29.5 MMOL/L (ref 22–29)
COCAINE UR QL: NEGATIVE
CREAT SERPL-MCNC: 1.19 MG/DL (ref 0.76–1.27)
DEPRECATED RDW RBC AUTO: 43.7 FL (ref 37–54)
EGFRCR SERPLBLD CKD-EPI 2021: 70.4 ML/MIN/1.73
ERYTHROCYTE [DISTWIDTH] IN BLOOD BY AUTOMATED COUNT: 13.3 % (ref 12.3–15.4)
GLOBULIN UR ELPH-MCNC: 2.7 GM/DL
GLUCOSE SERPL-MCNC: 98 MG/DL (ref 65–99)
HCT VFR BLD AUTO: 49.3 % (ref 37.5–51)
HDLC SERPL-MCNC: 43 MG/DL (ref 40–60)
HGB BLD-MCNC: 16.6 G/DL (ref 13–17.7)
LDLC SERPL CALC-MCNC: 116 MG/DL (ref 0–100)
LDLC/HDLC SERPL: 2.64 {RATIO}
MCH RBC QN AUTO: 30.2 PG (ref 26.6–33)
MCHC RBC AUTO-ENTMCNC: 33.7 G/DL (ref 31.5–35.7)
MCV RBC AUTO: 89.6 FL (ref 79–97)
METHADONE UR QL SCN: NEGATIVE
OPIATES UR QL: NEGATIVE
OXYCODONE UR QL SCN: POSITIVE
PCP UR QL SCN: NEGATIVE
PLATELET # BLD AUTO: 196 10*3/MM3 (ref 140–450)
PMV BLD AUTO: 10.7 FL (ref 6–12)
POTASSIUM SERPL-SCNC: 4.1 MMOL/L (ref 3.5–5.2)
PROT SERPL-MCNC: 7.1 G/DL (ref 6–8.5)
PSA SERPL-MCNC: 1.71 NG/ML (ref 0–4)
RBC # BLD AUTO: 5.5 10*6/MM3 (ref 4.14–5.8)
SODIUM SERPL-SCNC: 134 MMOL/L (ref 136–145)
TRICYCLICS UR QL SCN: NEGATIVE
TRIGL SERPL-MCNC: 112 MG/DL (ref 0–150)
TSH SERPL DL<=0.05 MIU/L-ACNC: 1.2 UIU/ML (ref 0.27–4.2)
VLDLC SERPL-MCNC: 20 MG/DL (ref 5–40)
WBC NRBC COR # BLD: 7.43 10*3/MM3 (ref 3.4–10.8)

## 2023-11-15 PROCEDURE — G0103 PSA SCREENING: HCPCS | Performed by: INTERNAL MEDICINE

## 2023-11-15 PROCEDURE — 36415 COLL VENOUS BLD VENIPUNCTURE: CPT | Performed by: INTERNAL MEDICINE

## 2023-11-15 PROCEDURE — 80053 COMPREHEN METABOLIC PANEL: CPT | Performed by: INTERNAL MEDICINE

## 2023-11-15 PROCEDURE — 80306 DRUG TEST PRSMV INSTRMNT: CPT | Performed by: INTERNAL MEDICINE

## 2023-11-15 PROCEDURE — 85027 COMPLETE CBC AUTOMATED: CPT | Performed by: INTERNAL MEDICINE

## 2023-11-15 PROCEDURE — 84443 ASSAY THYROID STIM HORMONE: CPT | Performed by: INTERNAL MEDICINE

## 2023-11-15 PROCEDURE — 80061 LIPID PANEL: CPT | Performed by: INTERNAL MEDICINE

## 2023-11-15 RX ORDER — KETOROLAC TROMETHAMINE 30 MG/ML
30 INJECTION, SOLUTION INTRAMUSCULAR; INTRAVENOUS ONCE
Status: COMPLETED | OUTPATIENT
Start: 2023-11-15 | End: 2023-11-15

## 2023-11-15 RX ORDER — QUETIAPINE FUMARATE 25 MG/1
25 TABLET, FILM COATED ORAL AS NEEDED
COMMUNITY

## 2023-11-15 RX ADMIN — KETOROLAC TROMETHAMINE 30 MG: 30 INJECTION, SOLUTION INTRAMUSCULAR; INTRAVENOUS at 12:47

## 2023-11-15 NOTE — PROGRESS NOTES
"Subjective   Raj Kuhn is a 58 y.o. male here for follow-up gallstones, HTN, anxiety and depression.  Says that mood has been overall stable.  He is happy with his medication regimen.  He says blood pressures been okay.  He has seen orthopedics and next year he will have a right knee revision by Dr. Marcano. He thinks his gallbladder is acting up. Would like removal. Has hx of gallstones.  Has aching right upper quadrant pain associated with eating.    I have reviewed the patient's relevant medical history and confirmed it is accurate.    I have personally reviewed and performed the ROS. Shabnam Zimmer MD     Objective   /82 (BP Location: Left arm)   Pulse 52   Ht 180.3 cm (71\")   Wt 109 kg (241 lb)   SpO2 96%   BMI 33.61 kg/m²     Physical Exam  Vitals reviewed.   Constitutional:       Appearance: He is well-developed.   Pulmonary:      Effort: Pulmonary effort is normal.   Neurological:      General: No focal deficit present.      Mental Status: He is alert.   Psychiatric:         Behavior: Behavior normal.         Thought Content: Thought content normal.         Judgment: Judgment normal.           Current Outpatient Medications:     aspirin 81 MG chewable tablet, Chew 1 tablet Daily., Disp: , Rfl:     clindamycin (CLEOCIN T) 1 % external solution, APPLY EXTERNALLY TO AFFECTED AREA TWO TIMES A DAY, Disp: 60 mL, Rfl: 3    diazePAM (VALIUM) 10 MG tablet, Take 1 tablet by mouth Every 8 (Eight) Hours As Needed for Anxiety. for anxiety, Disp: 90 tablet, Rfl: 2    docusate sodium (COLACE) 250 MG capsule, TAKE 1 CAPSULE BY MOUTH EVERY DAY, Disp: 90 capsule, Rfl: 3    escitalopram (LEXAPRO) 20 MG tablet, Take 1 tablet by mouth Daily. 200001, Disp: 90 tablet, Rfl: 3    esomeprazole (nexIUM) 40 MG capsule, TAKE 1 CAPSULE BY MOUTH IN THE MORNING before breakfast, Disp: 30 capsule, Rfl: 0    famotidine (PEPCID) 20 MG tablet, TAKE 1 TABLET BY MOUTH 2 TIMES A DAY (Patient taking differently: 1 " tablet Daily.), Disp: 60 tablet, Rfl: 5    fluticasone (Flonase) 50 MCG/ACT nasal spray, 2 sprays into the nostril(s) as directed by provider Daily., Disp: 15.8 mL, Rfl: 0    gabapentin (NEURONTIN) 600 MG tablet, Take 2 tablets by mouth 3 (Three) Times a Day. (Patient taking differently: Take 1 tablet by mouth 3 (Three) Times a Day.), Disp: 60 tablet, Rfl: 2    hydroCHLOROthiazide (HYDRODIURIL) 25 MG tablet, Take 0.5 tablets by mouth Daily. 200001, Disp: 90 tablet, Rfl: 2    Ketoconazole 2 % gel, Use twice daily over affected areas., Disp: 45 g, Rfl: 0    losartan (COZAAR) 100 MG tablet, TAKE 1 TABLET BY MOUTH EVERY DAY, Disp: 30 tablet, Rfl: 0    methocarbamol (ROBAXIN) 750 MG tablet, Take 1 tablet by mouth 4 (Four) Times a Day As Needed for Muscle Spasms., Disp: 360 tablet, Rfl: 1    nebivolol (BYSTOLIC) 10 MG tablet, TAKE 1 TABLET BY MOUTH EVERY DAY, Disp: 90 tablet, Rfl: 1    ondansetron ODT (ZOFRAN-ODT) 8 MG disintegrating tablet, DISSOLVE 1 TABLET IN MOUTH EVERY 8 HOURS AS NEEDED FOR NAUSEA AND VOMITING, Disp: 30 tablet, Rfl: 0    oxyCODONE-acetaminophen (PERCOCET)  MG per tablet, Take 1 tablet by mouth 3 (Three) Times a Day., Disp: , Rfl:     QUEtiapine (SEROquel) 25 MG tablet, Take 1 tablet by mouth As Needed., Disp: , Rfl:     tadalafil (Cialis) 20 MG tablet, Take 1 tablet by mouth Daily As Needed for Erectile Dysfunction., Disp: 30 tablet, Rfl: 2    valACYclovir (VALTREX) 1000 MG tablet, TAKE 1 TABLET BY MOUTH EVERY DAY, Disp: 30 tablet, Rfl: 5    zolpidem (AMBIEN) 5 MG tablet, Take 1 tablet by mouth At Night As Needed for Sleep., Disp: 30 tablet, Rfl: 5    Assessment & Plan   Diagnoses and all orders for this visit:    1. Medication management (Primary)  -     Urine Drug Screen - Urine, Clean Catch; Future    2. Calculus of gallbladder without cholecystitis without obstruction  -     Ambulatory Referral to General Surgery  -     ketorolac (TORADOL) injection 30 mg  -reviewed prior CT abd which did  show a large amt of gallstone which are now symptomatic    3. Benign essential HTN  -     Lipid Panel  -     CBC (No Diff)  -     Comprehensive Metabolic Panel    4. Prostate cancer screening  -     PSA Screen    5. Thyroid disorder screen  -     TSH Rfx On Abnormal To Free T4             Shabnam Zimmer MD

## 2023-11-20 ENCOUNTER — LAB (OUTPATIENT)
Dept: INTERNAL MEDICINE | Facility: CLINIC | Age: 58
End: 2023-11-20
Payer: MEDICARE

## 2023-11-20 DIAGNOSIS — E55.9 VITAMIN D DEFICIENCY: Primary | ICD-10-CM

## 2023-11-20 DIAGNOSIS — E55.9 VITAMIN D DEFICIENCY: ICD-10-CM

## 2023-11-20 DIAGNOSIS — R53.83 OTHER FATIGUE: ICD-10-CM

## 2023-11-20 PROCEDURE — 84403 ASSAY OF TOTAL TESTOSTERONE: CPT | Performed by: INTERNAL MEDICINE

## 2023-11-20 PROCEDURE — 82306 VITAMIN D 25 HYDROXY: CPT | Performed by: INTERNAL MEDICINE

## 2023-11-20 PROCEDURE — 84402 ASSAY OF FREE TESTOSTERONE: CPT | Performed by: INTERNAL MEDICINE

## 2023-11-20 PROCEDURE — 36415 COLL VENOUS BLD VENIPUNCTURE: CPT | Performed by: INTERNAL MEDICINE

## 2023-11-21 LAB — 25(OH)D3 SERPL-MCNC: 69.9 NG/ML (ref 30–100)

## 2023-11-26 LAB
TESTOST FREE SERPL-MCNC: 2 PG/ML (ref 7.2–24)
TESTOST SERPL-MCNC: 296.9 NG/DL (ref 264–916)

## 2023-12-01 DIAGNOSIS — I10 BENIGN ESSENTIAL HTN: ICD-10-CM

## 2023-12-01 DIAGNOSIS — K21.9 GASTROESOPHAGEAL REFLUX DISEASE WITHOUT ESOPHAGITIS: ICD-10-CM

## 2023-12-01 RX ORDER — LOSARTAN POTASSIUM 100 MG/1
TABLET ORAL
Qty: 30 TABLET | Refills: 0 | Status: SHIPPED | OUTPATIENT
Start: 2023-12-01

## 2023-12-01 RX ORDER — ESOMEPRAZOLE MAGNESIUM 40 MG/1
CAPSULE, DELAYED RELEASE ORAL
Qty: 30 CAPSULE | Refills: 0 | Status: SHIPPED | OUTPATIENT
Start: 2023-12-01

## 2023-12-06 DIAGNOSIS — F41.9 ANXIETY: ICD-10-CM

## 2023-12-06 RX ORDER — DIAZEPAM 10 MG/1
10 TABLET ORAL EVERY 8 HOURS PRN
Qty: 90 TABLET | Refills: 3 | Status: SHIPPED | OUTPATIENT
Start: 2023-12-06

## 2023-12-28 DIAGNOSIS — K21.9 GASTROESOPHAGEAL REFLUX DISEASE WITHOUT ESOPHAGITIS: ICD-10-CM

## 2023-12-28 DIAGNOSIS — I10 BENIGN ESSENTIAL HTN: ICD-10-CM

## 2023-12-28 DIAGNOSIS — R11.0 NAUSEA: ICD-10-CM

## 2023-12-28 RX ORDER — LOSARTAN POTASSIUM 100 MG/1
TABLET ORAL
Qty: 30 TABLET | Refills: 0 | Status: SHIPPED | OUTPATIENT
Start: 2023-12-28

## 2023-12-28 RX ORDER — ONDANSETRON 8 MG/1
TABLET, ORALLY DISINTEGRATING ORAL
Qty: 30 TABLET | Refills: 0 | Status: SHIPPED | OUTPATIENT
Start: 2023-12-28

## 2023-12-28 RX ORDER — ESOMEPRAZOLE MAGNESIUM 40 MG/1
CAPSULE, DELAYED RELEASE ORAL
Qty: 30 CAPSULE | Refills: 0 | Status: SHIPPED | OUTPATIENT
Start: 2023-12-28

## 2024-01-29 DIAGNOSIS — I10 BENIGN ESSENTIAL HTN: ICD-10-CM

## 2024-01-29 DIAGNOSIS — K21.9 GASTROESOPHAGEAL REFLUX DISEASE WITHOUT ESOPHAGITIS: ICD-10-CM

## 2024-01-30 RX ORDER — HYDRALAZINE HYDROCHLORIDE 25 MG/1
TABLET, FILM COATED ORAL
Qty: 60 TABLET | Refills: 0 | Status: SHIPPED | OUTPATIENT
Start: 2024-01-30

## 2024-01-30 RX ORDER — LOSARTAN POTASSIUM 100 MG/1
TABLET ORAL
Qty: 30 TABLET | Refills: 0 | Status: SHIPPED | OUTPATIENT
Start: 2024-01-30

## 2024-01-30 RX ORDER — ESOMEPRAZOLE MAGNESIUM 40 MG/1
CAPSULE, DELAYED RELEASE ORAL
Qty: 90 CAPSULE | Refills: 2 | Status: SHIPPED | OUTPATIENT
Start: 2024-01-30

## 2024-02-06 ENCOUNTER — TELEPHONE (OUTPATIENT)
Dept: GASTROENTEROLOGY | Facility: CLINIC | Age: 59
End: 2024-02-06
Payer: MEDICARE

## 2024-02-06 NOTE — TELEPHONE ENCOUNTER
Hub staff attempted to follow warm transfer process and was unsuccessful     Caller: Raj Kuhn    Relationship to patient: Self    Best call back number: [phone]    Patient is needing: PT IS CALLING TO CANCEL PROCEDURE ON 02/15/2024. PT IS WANTING TO RESCHEDULE THE PROCEDURE. PLEASE GIVE PT A CALL AND IF NOT ABLE TO REACH PT IT IS OKAY TO LEAVE AN VOICEMAIL.

## 2024-02-26 DIAGNOSIS — N52.9 ERECTILE DYSFUNCTION, UNSPECIFIED ERECTILE DYSFUNCTION TYPE: ICD-10-CM

## 2024-02-26 DIAGNOSIS — K59.03 DRUG-INDUCED CONSTIPATION: ICD-10-CM

## 2024-02-26 DIAGNOSIS — I10 BENIGN ESSENTIAL HTN: ICD-10-CM

## 2024-02-26 RX ORDER — HYDRALAZINE HYDROCHLORIDE 25 MG/1
25 TABLET, FILM COATED ORAL 2 TIMES DAILY
Qty: 60 TABLET | Refills: 0 | Status: SHIPPED | OUTPATIENT
Start: 2024-02-26

## 2024-02-26 RX ORDER — HYDROCHLOROTHIAZIDE 25 MG/1
12.5 TABLET ORAL DAILY
Qty: 45 TABLET | Refills: 1 | Status: SHIPPED | OUTPATIENT
Start: 2024-02-26

## 2024-02-26 RX ORDER — TADALAFIL 20 MG/1
20 TABLET ORAL DAILY PRN
Qty: 30 TABLET | Refills: 2 | Status: SHIPPED | OUTPATIENT
Start: 2024-02-26 | End: 2024-02-28 | Stop reason: SDUPTHER

## 2024-02-26 RX ORDER — TADALAFIL 20 MG/1
20 TABLET ORAL DAILY PRN
Qty: 30 TABLET | Refills: 2 | Status: SHIPPED | OUTPATIENT
Start: 2024-02-26 | End: 2024-02-26 | Stop reason: SDUPTHER

## 2024-02-26 RX ORDER — HYDROCHLOROTHIAZIDE 25 MG/1
12.5 TABLET ORAL DAILY
Start: 2024-02-26 | End: 2024-02-26 | Stop reason: SDUPTHER

## 2024-02-26 RX ORDER — DOCUSATE SODIUM 250 MG
1 CAPSULE ORAL DAILY
Qty: 90 CAPSULE | Refills: 3 | Status: SHIPPED | OUTPATIENT
Start: 2024-02-26 | End: 2024-02-28 | Stop reason: SDUPTHER

## 2024-02-26 RX ORDER — HYDROCHLOROTHIAZIDE 25 MG/1
12.5 TABLET ORAL DAILY
Qty: 45 TABLET | Refills: 1 | Status: SHIPPED | OUTPATIENT
Start: 2024-02-26 | End: 2024-02-26 | Stop reason: SDUPTHER

## 2024-02-26 RX ORDER — LOSARTAN POTASSIUM 100 MG/1
100 TABLET ORAL DAILY
Qty: 30 TABLET | Refills: 0 | Status: SHIPPED | OUTPATIENT
Start: 2024-02-26

## 2024-02-28 ENCOUNTER — TELEPHONE (OUTPATIENT)
Dept: INTERNAL MEDICINE | Facility: CLINIC | Age: 59
End: 2024-02-28
Payer: MEDICARE

## 2024-02-28 DIAGNOSIS — K59.03 DRUG-INDUCED CONSTIPATION: ICD-10-CM

## 2024-02-28 DIAGNOSIS — N52.9 ERECTILE DYSFUNCTION, UNSPECIFIED ERECTILE DYSFUNCTION TYPE: ICD-10-CM

## 2024-02-28 RX ORDER — TADALAFIL 20 MG/1
20 TABLET ORAL DAILY PRN
Qty: 30 TABLET | Refills: 2 | Status: SHIPPED | OUTPATIENT
Start: 2024-02-28

## 2024-02-28 RX ORDER — DOCUSATE SODIUM 250 MG
1 CAPSULE ORAL DAILY
Qty: 90 CAPSULE | Refills: 3 | Status: SHIPPED | OUTPATIENT
Start: 2024-02-28

## 2024-02-28 RX ORDER — TADALAFIL 20 MG/1
20 TABLET ORAL DAILY PRN
Qty: 30 TABLET | Refills: 2 | Status: SHIPPED | OUTPATIENT
Start: 2024-02-28 | End: 2024-02-28 | Stop reason: SDUPTHER

## 2024-02-28 RX ORDER — DOCUSATE SODIUM 250 MG
1 CAPSULE ORAL DAILY
Qty: 90 CAPSULE | Refills: 3 | Status: SHIPPED | OUTPATIENT
Start: 2024-02-28 | End: 2024-02-28 | Stop reason: SDUPTHER

## 2024-02-28 NOTE — TELEPHONE ENCOUNTER
----- Message from Raj Kuhn sent at 2/28/2024  9:38 AM EST -----  Regarding: HCTZ Refill  Contact: [phone]  Thank you, there was one issue, [facility] said they didn't receive the Cialis 20mg, actually no Cialis.  The girl at the drive thru said their electronic prescription was down and that may have been how you sent it.   told me to call and ask you to fax it.  Here is something to look at too, the last Ciàlis I requested I had sent to [facility] here in [place] so you may want to see if it just defaulted there, if so that's fine I think they have a discount plan, [facility] uses [facility]    which is a cheap plan for generic  that's why I wanted it there.  But again if it accidentally was sent to [employer] [facility] [employer] it's my understanding they offer a discount.    One more thing, [facility] a [facility] to get refills on my Docusate Sodium, I had to have it sent there because [facility] policy will not let them fill it because it can be bought over the counter but it's only 100 MG over there counter and even if I take 3 it doesn't work near as well as the prescription version which is 250mg.    Thank you so much!!!

## 2024-03-13 DIAGNOSIS — F51.01 PRIMARY INSOMNIA: ICD-10-CM

## 2024-03-13 RX ORDER — ZOLPIDEM TARTRATE 5 MG/1
TABLET ORAL
Qty: 30 TABLET | Refills: 3 | Status: SHIPPED | OUTPATIENT
Start: 2024-03-13

## 2024-03-19 ENCOUNTER — LAB REQUISITION (OUTPATIENT)
Dept: LAB | Facility: HOSPITAL | Age: 59
End: 2024-03-19
Payer: MEDICARE

## 2024-03-19 DIAGNOSIS — K80.20 CALCULUS OF GALLBLADDER WITHOUT CHOLECYSTITIS WITHOUT OBSTRUCTION: ICD-10-CM

## 2024-03-19 PROCEDURE — 88304 TISSUE EXAM BY PATHOLOGIST: CPT | Performed by: SURGERY

## 2024-03-20 LAB
CYTO UR: NORMAL
LAB AP CASE REPORT: NORMAL
LAB AP CLINICAL INFORMATION: NORMAL
PATH REPORT.FINAL DX SPEC: NORMAL
PATH REPORT.GROSS SPEC: NORMAL

## 2024-04-02 ENCOUNTER — TELEPHONE (OUTPATIENT)
Dept: INTERNAL MEDICINE | Facility: CLINIC | Age: 59
End: 2024-04-02
Payer: MEDICARE

## 2024-04-02 DIAGNOSIS — I10 BENIGN ESSENTIAL HTN: ICD-10-CM

## 2024-04-02 RX ORDER — LOSARTAN POTASSIUM 100 MG/1
100 TABLET ORAL DAILY
Qty: 30 TABLET | Refills: 0 | Status: SHIPPED | OUTPATIENT
Start: 2024-04-02

## 2024-04-02 RX ORDER — NEBIVOLOL 10 MG/1
10 TABLET ORAL DAILY
Qty: 90 TABLET | Refills: 1 | Status: SHIPPED | OUTPATIENT
Start: 2024-04-02

## 2024-04-02 NOTE — TELEPHONE ENCOUNTER
----- Message from Raj Kuhn sent at 4/2/2024  4:15 PM EDT -----  Regarding: Bystolic and Losartan Potassium refills  Contact: [phone]  I'm sorry for the word gorgeous in the message it blurs out what is auto corrected under where it tells how many characters are remaining.  Hope you are well.  Thanks!

## 2024-04-03 DIAGNOSIS — F41.9 ANXIETY: ICD-10-CM

## 2024-04-04 RX ORDER — DIAZEPAM 10 MG/1
10 TABLET ORAL EVERY 8 HOURS PRN
Qty: 90 TABLET | Refills: 0 | Status: SHIPPED | OUTPATIENT
Start: 2024-04-04

## 2024-04-23 RX ORDER — SODIUM PICOSULFATE, MAGNESIUM OXIDE, AND ANHYDROUS CITRIC ACID 12; 3.5; 1 G/175ML; G/175ML; MG/175ML
350 LIQUID ORAL ONCE
Qty: 350 ML | Refills: 0 | Status: SHIPPED | OUTPATIENT
Start: 2024-04-23 | End: 2024-04-23

## 2024-04-24 ENCOUNTER — TELEPHONE (OUTPATIENT)
Dept: GASTROENTEROLOGY | Facility: CLINIC | Age: 59
End: 2024-04-24

## 2024-04-24 NOTE — TELEPHONE ENCOUNTER
Provider: HELADIO TSAI    Caller: KVNG GOMEZ    Relationship to Patient: SELF    Phone Number: [phone]    Reason for Call: PATIENT CALLED IN AND STATED THAT HE WILL NEED TO RESCHEDULE HIS COLONOSCOPY SCHEDULED FOR 04/30/2024 DUE TO HIM FALLING AND HAVING SOME SERIOUS INJURIES. PLEASE CALL BACK AFTER 10A, OKAY TO LEAVE .

## 2024-04-25 DIAGNOSIS — I10 BENIGN ESSENTIAL HTN: ICD-10-CM

## 2024-04-25 RX ORDER — LOSARTAN POTASSIUM 100 MG/1
100 TABLET ORAL DAILY
Qty: 30 TABLET | Refills: 0 | Status: SHIPPED | OUTPATIENT
Start: 2024-04-25

## 2024-04-29 NOTE — TELEPHONE ENCOUNTER
Hub staff attempted to follow warm transfer process and was unsuccessful     Caller: Raj Kuhn S    Relationship to patient: Self    Best call back number: [phone]     Patient is needing: PATIENT IS CALLING BACK TO RESCHEDULE SCOPE. HE IS UPSET THAT HE PROCEDURE WAS NOT CANCELLED FIRST. PLEASE CALL BACK TO ADVISE WITH SCHEDULING.

## 2024-05-03 DIAGNOSIS — F41.9 ANXIETY: ICD-10-CM

## 2024-05-03 NOTE — TELEPHONE ENCOUNTER
----- Message from Krishna ADAMS sent at 5/3/2024  7:42 AM EDT -----  Regarding: FW: Diazepam refill  Contact: [phone]    ----- Message -----  From: Raj Kuhn  Sent: 5/3/2024   7:02 AM EDT  To: justine [facility]  Subject: Diazepam refill                                  Hi Dr Zimmer, it's time to refill my Diazepam and it has no refills on the bottle.  Thanks Raj

## 2024-05-04 RX ORDER — DIAZEPAM 10 MG/1
10 TABLET ORAL EVERY 8 HOURS PRN
Qty: 90 TABLET | Refills: 0 | Status: SHIPPED | OUTPATIENT
Start: 2024-05-04

## 2024-05-15 ENCOUNTER — OFFICE VISIT (OUTPATIENT)
Dept: INTERNAL MEDICINE | Facility: CLINIC | Age: 59
End: 2024-05-15
Payer: MEDICARE

## 2024-05-15 VITALS
HEART RATE: 55 BPM | WEIGHT: 242 LBS | HEIGHT: 71 IN | BODY MASS INDEX: 33.88 KG/M2 | DIASTOLIC BLOOD PRESSURE: 72 MMHG | SYSTOLIC BLOOD PRESSURE: 130 MMHG | OXYGEN SATURATION: 93 %

## 2024-05-15 DIAGNOSIS — F32.A ANXIETY AND DEPRESSION: ICD-10-CM

## 2024-05-15 DIAGNOSIS — G89.29 CHRONIC PAIN OF LEFT KNEE: Primary | ICD-10-CM

## 2024-05-15 DIAGNOSIS — M25.562 CHRONIC PAIN OF LEFT KNEE: Primary | ICD-10-CM

## 2024-05-15 DIAGNOSIS — F41.9 ANXIETY AND DEPRESSION: ICD-10-CM

## 2024-05-15 DIAGNOSIS — M79.601 PAIN OF RIGHT UPPER EXTREMITY: ICD-10-CM

## 2024-05-15 PROCEDURE — 3075F SYST BP GE 130 - 139MM HG: CPT | Performed by: INTERNAL MEDICINE

## 2024-05-15 PROCEDURE — 3078F DIAST BP <80 MM HG: CPT | Performed by: INTERNAL MEDICINE

## 2024-05-15 PROCEDURE — 1125F AMNT PAIN NOTED PAIN PRSNT: CPT | Performed by: INTERNAL MEDICINE

## 2024-05-15 PROCEDURE — 99214 OFFICE O/P EST MOD 30 MIN: CPT | Performed by: INTERNAL MEDICINE
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
## 2024-05-24 DIAGNOSIS — I10 BENIGN ESSENTIAL HTN: ICD-10-CM

## 2024-05-24 RX ORDER — LOSARTAN POTASSIUM 100 MG/1
100 TABLET ORAL DAILY
Qty: 30 TABLET | Refills: 0 | Status: SHIPPED | OUTPATIENT
Start: 2024-05-24

## 2024-05-31 DIAGNOSIS — F41.9 ANXIETY: ICD-10-CM

## 2024-06-03 ENCOUNTER — TELEPHONE (OUTPATIENT)
Dept: INTERNAL MEDICINE | Facility: CLINIC | Age: 59
End: 2024-06-03
Payer: MEDICARE

## 2024-06-03 DIAGNOSIS — F41.9 ANXIETY: ICD-10-CM

## 2024-06-03 RX ORDER — DIAZEPAM 10 MG/1
10 TABLET ORAL EVERY 8 HOURS PRN
Qty: 90 TABLET | Refills: 2 | Status: SHIPPED | OUTPATIENT
Start: 2024-06-03 | End: 2024-06-03 | Stop reason: SDUPTHER

## 2024-06-03 RX ORDER — DIAZEPAM 10 MG/1
10 TABLET ORAL EVERY 8 HOURS PRN
Qty: 90 TABLET | Refills: 2 | Status: SHIPPED | OUTPATIENT
Start: 2024-06-03

## 2024-06-03 NOTE — TELEPHONE ENCOUNTER
Caller: Raj Kuhn S    Relationship: Self    Best call back number:     Which medication are you concerned about:   DIAZEPAM  Who prescribed you this medication: LONNY    When did you start taking this medication: PRESCRIPTION WAS SENT TO WRONG PHARMACY      Hi Dr Bautista,  it's time for my valium to be refilled.  I would like to have it refilled at [facility]  [phone], located at [address] [place] [address].  They are only open 1/2 day tomorrow and closed on Sundays.  I'm due for a refill Sun or latest Monday.  Again they are closed Sunday.  Thank you very much!       Raj    What are your concerns: PLEASE CHANGE THE PHARMACY FROM [facility] SHOWN ABOVE.     How long have you had these concerns: THE PATIENT SENT THE ABOVE MESSAGE PREVIOUSLY.    THE PATIENT ASK FOR A RETURN CALL WHEN THIS HAS BEEN DONE.   LEAVE A MESSAGE ON HIS VOICE MAIL IF NEEDED.

## 2024-06-24 DIAGNOSIS — A60.01 HERPES SIMPLEX INFECTION OF PENIS: ICD-10-CM

## 2024-06-24 DIAGNOSIS — I10 BENIGN ESSENTIAL HTN: ICD-10-CM

## 2024-06-24 RX ORDER — LOSARTAN POTASSIUM 100 MG/1
100 TABLET ORAL DAILY
Qty: 30 TABLET | Refills: 0 | Status: SHIPPED | OUTPATIENT
Start: 2024-06-24

## 2024-06-24 RX ORDER — VALACYCLOVIR HYDROCHLORIDE 1 G/1
TABLET, FILM COATED ORAL
Qty: 30 TABLET | Refills: 0 | Status: SHIPPED | OUTPATIENT
Start: 2024-06-24

## 2024-07-02 ENCOUNTER — OFFICE VISIT (OUTPATIENT)
Dept: INTERNAL MEDICINE | Facility: CLINIC | Age: 59
End: 2024-07-02
Payer: MEDICARE

## 2024-07-02 VITALS
SYSTOLIC BLOOD PRESSURE: 136 MMHG | BODY MASS INDEX: 33.88 KG/M2 | HEIGHT: 71 IN | WEIGHT: 242 LBS | OXYGEN SATURATION: 93 % | DIASTOLIC BLOOD PRESSURE: 86 MMHG | HEART RATE: 60 BPM

## 2024-07-02 DIAGNOSIS — H66.90 ACUTE OTITIS MEDIA, UNSPECIFIED OTITIS MEDIA TYPE: ICD-10-CM

## 2024-07-02 DIAGNOSIS — Z12.83 SKIN CANCER SCREENING: ICD-10-CM

## 2024-07-02 DIAGNOSIS — R10.9 CHRONIC ABDOMINAL PAIN: ICD-10-CM

## 2024-07-02 DIAGNOSIS — L98.9 SKIN LESION: Primary | ICD-10-CM

## 2024-07-02 DIAGNOSIS — G89.29 CHRONIC ABDOMINAL PAIN: ICD-10-CM

## 2024-07-02 PROCEDURE — 3075F SYST BP GE 130 - 139MM HG: CPT | Performed by: INTERNAL MEDICINE

## 2024-07-02 PROCEDURE — 1125F AMNT PAIN NOTED PAIN PRSNT: CPT | Performed by: INTERNAL MEDICINE

## 2024-07-02 PROCEDURE — 3079F DIAST BP 80-89 MM HG: CPT | Performed by: INTERNAL MEDICINE

## 2024-07-02 PROCEDURE — 99214 OFFICE O/P EST MOD 30 MIN: CPT | Performed by: INTERNAL MEDICINE

## 2024-07-02 RX ORDER — AMOXICILLIN AND CLAVULANATE POTASSIUM 875; 125 MG/1; MG/1
1 TABLET, FILM COATED ORAL 2 TIMES DAILY
Qty: 14 TABLET | Refills: 0 | Status: SHIPPED | OUTPATIENT
Start: 2024-07-02 | End: 2024-07-09

## 2024-07-02 NOTE — PROGRESS NOTES
"Subjective   Raj Kuhn is a 58 y.o. male here for skin lesion on right buttock over the last month, chronic abdominal pain, ear pain.  Usually gets a herpes outbreak on the right buttock, and he does think there was some blisters in that area, but it will not heal.  It is brown and discolored.  Not painful and no more active blisters.  He does not have a dermatologist.  He also has abdominal pain that comes and goes, not related to eating.  It is present on the right side of his abdomen.  Normal bowel movements, colonoscopy scheduled for the end of this month.  No nausea or vomiting.  Pain is not associated with eating.  Lastly, has some left ear pain, getting worse throughout the day, stabbing.    I have reviewed the patient's relevant medical history and confirmed it is accurate.    I have personally reviewed and performed the ROS. Shabnam Zimmer MD     Objective   /86 (BP Location: Right arm)   Pulse 60   Ht 180.3 cm (71\")   Wt 110 kg (242 lb)   SpO2 93%   BMI 33.75 kg/m²     Physical Exam  Constitutional:       Appearance: He is well-developed.   HENT:      Right Ear: Tympanic membrane, ear canal and external ear normal.      Left Ear: Ear canal and external ear normal.      Ears:      Comments: Left TM completely obstructed by old cerumen  Pulmonary:      Effort: Pulmonary effort is normal.   Skin:            Comments: On right buttock there is a slightly scaly darkened brown area, about 4 cm x 3 cm.  No active blisters or ulceration   Neurological:      General: No focal deficit present.      Mental Status: He is alert.   Psychiatric:         Behavior: Behavior normal.         Thought Content: Thought content normal.         Judgment: Judgment normal.         Current Outpatient Medications:   •  aspirin 81 MG chewable tablet, Chew 1 tablet Daily., Disp: , Rfl:   •  clindamycin (CLEOCIN T) 1 % external solution, APPLY EXTERNALLY TO AFFECTED AREA TWO TIMES A DAY, Disp: 60 mL, Rfl: " 3  •  diazePAM (VALIUM) 10 MG tablet, Take 1 tablet by mouth Every 8 (Eight) Hours As Needed for Anxiety. for anxiety, Disp: 90 tablet, Rfl: 2  •  docusate sodium (COLACE) 250 MG capsule, Take 1 capsule by mouth Daily., Disp: 90 capsule, Rfl: 3  •  escitalopram (LEXAPRO) 20 MG tablet, Take 1 tablet by mouth Daily. 200001, Disp: 90 tablet, Rfl: 3  •  esomeprazole (nexIUM) 40 MG capsule, TAKE 1 CAPSULE BY MOUTH IN THE MORNING BEFORE BREAKFAST, Disp: 90 capsule, Rfl: 2  •  famotidine (PEPCID) 20 MG tablet, TAKE 1 TABLET BY MOUTH 2 TIMES A DAY (Patient taking differently: 1 tablet Daily.), Disp: 60 tablet, Rfl: 5  •  fluticasone (Flonase) 50 MCG/ACT nasal spray, 2 sprays into the nostril(s) as directed by provider Daily., Disp: 15.8 mL, Rfl: 0  •  gabapentin (NEURONTIN) 600 MG tablet, Take 2 tablets by mouth 3 (Three) Times a Day. (Patient taking differently: Take 1 tablet by mouth 3 (Three) Times a Day.), Disp: 60 tablet, Rfl: 2  •  hydroCHLOROthiazide 25 MG tablet, Take 0.5 tablets by mouth Daily. 200001, Disp: 45 tablet, Rfl: 1  •  Ketoconazole 2 % gel, Use twice daily over affected areas., Disp: 45 g, Rfl: 0  •  losartan (COZAAR) 100 MG tablet, TAKE 1 TABLET BY MOUTH EVERY DAY, Disp: 30 tablet, Rfl: 0  •  methocarbamol (ROBAXIN) 750 MG tablet, Take 1 tablet by mouth 4 (Four) Times a Day As Needed for Muscle Spasms., Disp: 360 tablet, Rfl: 1  •  nebivolol (BYSTOLIC) 10 MG tablet, Take 1 tablet by mouth Daily., Disp: 90 tablet, Rfl: 1  •  ondansetron ODT (ZOFRAN-ODT) 8 MG disintegrating tablet, DISSOLVE 1 TABLET IN MOUTH EVERY 8 HOURS AS NEEDED FOR NAUSEA AND VOMITING, Disp: 30 tablet, Rfl: 0  •  oxyCODONE-acetaminophen (PERCOCET)  MG per tablet, Take 1 tablet by mouth Every 4 (Four) Hours As Needed., Disp: 10 tablet, Rfl: 0  •  QUEtiapine (SEROquel) 25 MG tablet, Take 1 tablet by mouth As Needed., Disp: , Rfl:   •  tadalafil (Cialis) 20 MG tablet, Take 1 tablet by mouth Daily As Needed for Erectile  Dysfunction., Disp: 30 tablet, Rfl: 2  •  valACYclovir (VALTREX) 1000 MG tablet, TAKE 1 TABLET BY MOUTH EVERY DAY, Disp: 30 tablet, Rfl: 0  •  zolpidem (AMBIEN) 5 MG tablet, TAKE ONE TABLET BY MOUTH NIGHTLY AT BEDTIME AS NEEDED, Disp: 30 tablet, Rfl: 3    Assessment & Plan   Diagnoses and all orders for this visit:    1. Skin lesion (Primary)  -     Ambulatory Referral to Dermatology    2. Skin cancer screening  -     Ambulatory Referral to Dermatology    3. Chronic abdominal pain  -     CT Abdomen Pelvis Without Contrast; Future  -Colonoscopy scheduled end of this month    4. Acute otitis media, unspecified otitis media type  -     amoxicillin-clavulanate (AUGMENTIN) 875-125 MG per tablet; Take 1 tablet by mouth 2 (Two) Times a Day for 7 days.  Dispense: 14 tablet; Refill: 0             Shabnam Zimmer MD      Answers submitted by the patient for this visit:  Primary Reason for Visit (Submitted on 6/28/2024)  What is the primary reason for your visit?: Other  Other (Submitted on 6/28/2024)  Please describe your symptoms.: I have a wound on the upper right side of my buttock that is staying sore and doesn't seem to be healing completely.  Have you had these symptoms before?: No  How long have you been having these symptoms?: Greater than 2 weeks  Please list any medications you are currently taking for this condition.: Valtrex and I took a 10 day round of cephalexin, prescribed by Dr Sandoval when I last saw him for my euflexxa injections.  He said he hadn't seen anything like thatat wound, put betaine on it, and prescribed cephalexin for q0 days.  I can't tell how much the wound has healed since it's on my buttocks.  Please describe any probable cause for these symptoms. : I'm unsure, I fear it's a spider bite, particularly a brown recluse.  I haven't seen any brown recluse that I know of.  I have killed two brown spiders this year, I didn't see the fiddle jacob on their back.  Neither bit me but I do have  pictures of the spiders I killed.  I occasionally have breakouts in this area but the valtrex gets them in control pretty quick which isn't the case this time.

## 2024-07-03 DIAGNOSIS — F51.01 PRIMARY INSOMNIA: ICD-10-CM

## 2024-07-03 RX ORDER — ZOLPIDEM TARTRATE 5 MG/1
TABLET ORAL
Qty: 30 TABLET | Refills: 3 | Status: SHIPPED | OUTPATIENT
Start: 2024-07-03

## 2024-07-08 ENCOUNTER — TELEPHONE (OUTPATIENT)
Dept: INTERNAL MEDICINE | Facility: CLINIC | Age: 59
End: 2024-07-08
Payer: MEDICARE

## 2024-07-08 NOTE — TELEPHONE ENCOUNTER
Caller: Raj Kuhn    Relationship: Self    Best call back number: [phone]    What is the medical concern/diagnosis:     What specialty or service is being requested: CT ABDOMEN PELIVIS W/O     Any additional details: PATIENT IS GOING TO CHECK WITH HIS INSURANCE TO SEE WHICH LOCATION WOULD BE CHEAPER. [facility] OR [facility].   PATIENT STATED HE WILL CALL  BACK TO LET THE OFFICE KNOW

## 2024-07-09 NOTE — TELEPHONE ENCOUNTER
PT CALLED THE OFFICE BACK AND ASKED TO GO TO [facility] FOR THE CT SCAN, FAXED THE ORDER [phone].

## 2024-07-10 ENCOUNTER — OFFICE VISIT (OUTPATIENT)
Dept: INTERNAL MEDICINE | Facility: CLINIC | Age: 59
End: 2024-07-10
Payer: MEDICARE

## 2024-07-10 VITALS
OXYGEN SATURATION: 98 % | HEART RATE: 86 BPM | SYSTOLIC BLOOD PRESSURE: 138 MMHG | DIASTOLIC BLOOD PRESSURE: 76 MMHG | WEIGHT: 242 LBS | BODY MASS INDEX: 33.88 KG/M2 | HEIGHT: 71 IN

## 2024-07-10 DIAGNOSIS — H61.22 IMPACTED CERUMEN OF LEFT EAR: Primary | ICD-10-CM

## 2024-07-10 PROCEDURE — 3078F DIAST BP <80 MM HG: CPT | Performed by: INTERNAL MEDICINE

## 2024-07-10 PROCEDURE — 69209 REMOVE IMPACTED EAR WAX UNI: CPT | Performed by: INTERNAL MEDICINE

## 2024-07-10 PROCEDURE — 3075F SYST BP GE 130 - 139MM HG: CPT | Performed by: INTERNAL MEDICINE

## 2024-07-10 PROCEDURE — 1125F AMNT PAIN NOTED PAIN PRSNT: CPT | Performed by: INTERNAL MEDICINE

## 2024-07-10 NOTE — PROGRESS NOTES
"Subjective   Raj Kuhn is a 58 y.o. male here for cerumen impaction on the left.  He has been using ear softening drops for 1 week and presents for lavage.  No new complaints.    I have reviewed the patient's relevant medical history and confirmed it is accurate.    I have personally reviewed and performed the ROS. Shabnam Zimmer MD     Objective   /76 (BP Location: Left arm)   Pulse 86   Ht 180.3 cm (71\")   Wt 110 kg (242 lb)   SpO2 98%   BMI 33.75 kg/m²     Physical Exam  Constitutional:       Appearance: He is well-developed.   HENT:      Ears:      Comments: Left cerumen impaction, fairly deep but wax looks soft and brown  Pulmonary:      Effort: Pulmonary effort is normal.   Neurological:      General: No focal deficit present.      Mental Status: He is alert.   Psychiatric:         Behavior: Behavior normal.         Thought Content: Thought content normal.         Judgment: Judgment normal.           Current Outpatient Medications:     aspirin 81 MG chewable tablet, Chew 1 tablet Daily., Disp: , Rfl:     clindamycin (CLEOCIN T) 1 % external solution, APPLY EXTERNALLY TO AFFECTED AREA TWO TIMES A DAY, Disp: 60 mL, Rfl: 3    diazePAM (VALIUM) 10 MG tablet, Take 1 tablet by mouth Every 8 (Eight) Hours As Needed for Anxiety. for anxiety, Disp: 90 tablet, Rfl: 2    docusate sodium (COLACE) 250 MG capsule, Take 1 capsule by mouth Daily., Disp: 90 capsule, Rfl: 3    escitalopram (LEXAPRO) 20 MG tablet, Take 1 tablet by mouth Daily. 200001, Disp: 90 tablet, Rfl: 3    esomeprazole (nexIUM) 40 MG capsule, TAKE 1 CAPSULE BY MOUTH IN THE MORNING BEFORE BREAKFAST, Disp: 90 capsule, Rfl: 2    famotidine (PEPCID) 20 MG tablet, TAKE 1 TABLET BY MOUTH 2 TIMES A DAY (Patient taking differently: 1 tablet Daily.), Disp: 60 tablet, Rfl: 5    fluticasone (Flonase) 50 MCG/ACT nasal spray, 2 sprays into the nostril(s) as directed by provider Daily., Disp: 15.8 mL, Rfl: 0    gabapentin (NEURONTIN) 600 MG " tablet, Take 2 tablets by mouth 3 (Three) Times a Day. (Patient taking differently: Take 1 tablet by mouth 3 (Three) Times a Day.), Disp: 60 tablet, Rfl: 2    hydroCHLOROthiazide 25 MG tablet, Take 0.5 tablets by mouth Daily. 200001, Disp: 45 tablet, Rfl: 1    Ketoconazole 2 % gel, Use twice daily over affected areas., Disp: 45 g, Rfl: 0    losartan (COZAAR) 100 MG tablet, TAKE 1 TABLET BY MOUTH EVERY DAY, Disp: 30 tablet, Rfl: 0    methocarbamol (ROBAXIN) 750 MG tablet, Take 1 tablet by mouth 4 (Four) Times a Day As Needed for Muscle Spasms., Disp: 360 tablet, Rfl: 1    nebivolol (BYSTOLIC) 10 MG tablet, Take 1 tablet by mouth Daily., Disp: 90 tablet, Rfl: 1    ondansetron ODT (ZOFRAN-ODT) 8 MG disintegrating tablet, DISSOLVE 1 TABLET IN MOUTH EVERY 8 HOURS AS NEEDED FOR NAUSEA AND VOMITING, Disp: 30 tablet, Rfl: 0    oxyCODONE-acetaminophen (PERCOCET)  MG per tablet, Take 1 tablet by mouth Every 4 (Four) Hours As Needed., Disp: 10 tablet, Rfl: 0    QUEtiapine (SEROquel) 25 MG tablet, Take 1 tablet by mouth As Needed., Disp: , Rfl:     tadalafil (Cialis) 20 MG tablet, Take 1 tablet by mouth Daily As Needed for Erectile Dysfunction., Disp: 30 tablet, Rfl: 2    valACYclovir (VALTREX) 1000 MG tablet, TAKE 1 TABLET BY MOUTH EVERY DAY, Disp: 30 tablet, Rfl: 0    zolpidem (AMBIEN) 5 MG tablet, TAKE ONE TABLET BY MOUTH NIGHTLY AT BEDTIME AS NEEDED, Disp: 30 tablet, Rfl: 3    Assessment & Plan   Diagnoses and all orders for this visit:    1. Impacted cerumen of left ear (Primary)  -Cerumen successfully cleared, continue to use ear softening drops periodically           Shabnam Zimmer MD

## 2024-07-15 DIAGNOSIS — R11.0 NAUSEA: ICD-10-CM

## 2024-07-15 RX ORDER — ONDANSETRON 8 MG/1
8 TABLET, ORALLY DISINTEGRATING ORAL EVERY 8 HOURS PRN
Qty: 30 TABLET | Refills: 0 | Status: SHIPPED | OUTPATIENT
Start: 2024-07-15

## 2024-07-24 ENCOUNTER — OFFICE VISIT (OUTPATIENT)
Dept: INTERNAL MEDICINE | Facility: CLINIC | Age: 59
End: 2024-07-24
Payer: MEDICARE

## 2024-07-24 VITALS
BODY MASS INDEX: 33.04 KG/M2 | HEIGHT: 71 IN | DIASTOLIC BLOOD PRESSURE: 72 MMHG | SYSTOLIC BLOOD PRESSURE: 140 MMHG | HEART RATE: 61 BPM | OXYGEN SATURATION: 95 % | WEIGHT: 236 LBS

## 2024-07-24 DIAGNOSIS — M54.50 ACUTE BILATERAL LOW BACK PAIN WITHOUT SCIATICA: Primary | ICD-10-CM

## 2024-07-24 DIAGNOSIS — I10 BENIGN ESSENTIAL HTN: ICD-10-CM

## 2024-07-24 PROCEDURE — 3078F DIAST BP <80 MM HG: CPT | Performed by: INTERNAL MEDICINE

## 2024-07-24 PROCEDURE — 3077F SYST BP >= 140 MM HG: CPT | Performed by: INTERNAL MEDICINE

## 2024-07-24 PROCEDURE — 99213 OFFICE O/P EST LOW 20 MIN: CPT | Performed by: INTERNAL MEDICINE

## 2024-07-24 PROCEDURE — 96372 THER/PROPH/DIAG INJ SC/IM: CPT | Performed by: INTERNAL MEDICINE

## 2024-07-24 PROCEDURE — 1125F AMNT PAIN NOTED PAIN PRSNT: CPT | Performed by: INTERNAL MEDICINE

## 2024-07-24 RX ORDER — LOSARTAN POTASSIUM 100 MG/1
100 TABLET ORAL DAILY
Qty: 90 TABLET | Refills: 3 | Status: SHIPPED | OUTPATIENT
Start: 2024-07-24

## 2024-07-24 RX ORDER — KETOROLAC TROMETHAMINE 30 MG/ML
30 INJECTION, SOLUTION INTRAMUSCULAR; INTRAVENOUS ONCE
Status: COMPLETED | OUTPATIENT
Start: 2024-07-24 | End: 2024-07-24

## 2024-07-24 RX ADMIN — KETOROLAC TROMETHAMINE 30 MG: 30 INJECTION, SOLUTION INTRAMUSCULAR; INTRAVENOUS at 11:31

## 2024-07-24 NOTE — PROGRESS NOTES
"Subjective   Raj Kuhn is a 58 y.o. male here for acute on chronic low back pain.  He says he thinks he could benefit from a Toradol injection as that is what usually helps his back pain.  He has not had 1 of those recently.  No new symptoms with his back, just a flare of chronic pain.  Also would like refill on losartan, blood pressure has generally been controlled.    I have reviewed the patient's relevant medical history and confirmed it is accurate.    I have personally reviewed and performed the ROS. Shabnam Zimmer MD     Objective   /72 (BP Location: Left arm)   Pulse 61   Ht 180.3 cm (71\")   Wt 107 kg (236 lb)   SpO2 95%   BMI 32.92 kg/m²     Physical Exam  Constitutional:       Appearance: He is well-developed.   Pulmonary:      Effort: Pulmonary effort is normal.   Neurological:      General: No focal deficit present.      Mental Status: He is alert.   Psychiatric:         Behavior: Behavior normal.         Thought Content: Thought content normal.         Judgment: Judgment normal.           Current Outpatient Medications:     aspirin 81 MG chewable tablet, Chew 1 tablet Daily., Disp: , Rfl:     clindamycin (CLEOCIN T) 1 % external solution, APPLY EXTERNALLY TO AFFECTED AREA TWO TIMES A DAY, Disp: 60 mL, Rfl: 3    diazePAM (VALIUM) 10 MG tablet, Take 1 tablet by mouth Every 8 (Eight) Hours As Needed for Anxiety. for anxiety, Disp: 90 tablet, Rfl: 2    docusate sodium (COLACE) 250 MG capsule, Take 1 capsule by mouth Daily., Disp: 90 capsule, Rfl: 3    escitalopram (LEXAPRO) 20 MG tablet, Take 1 tablet by mouth Daily. 200001, Disp: 90 tablet, Rfl: 3    esomeprazole (nexIUM) 40 MG capsule, TAKE 1 CAPSULE BY MOUTH IN THE MORNING BEFORE BREAKFAST, Disp: 90 capsule, Rfl: 2    famotidine (PEPCID) 20 MG tablet, TAKE 1 TABLET BY MOUTH 2 TIMES A DAY (Patient taking differently: 1 tablet Daily.), Disp: 60 tablet, Rfl: 5    fluticasone (Flonase) 50 MCG/ACT nasal spray, 2 sprays into the " nostril(s) as directed by provider Daily., Disp: 15.8 mL, Rfl: 0    gabapentin (NEURONTIN) 600 MG tablet, Take 2 tablets by mouth 3 (Three) Times a Day. (Patient taking differently: Take 1 tablet by mouth 3 (Three) Times a Day.), Disp: 60 tablet, Rfl: 2    hydroCHLOROthiazide 25 MG tablet, Take 0.5 tablets by mouth Daily. 200001, Disp: 45 tablet, Rfl: 1    Ketoconazole 2 % gel, Use twice daily over affected areas., Disp: 45 g, Rfl: 0    losartan (COZAAR) 100 MG tablet, TAKE 1 TABLET BY MOUTH EVERY DAY, Disp: 30 tablet, Rfl: 0    methocarbamol (ROBAXIN) 750 MG tablet, Take 1 tablet by mouth 4 (Four) Times a Day As Needed for Muscle Spasms., Disp: 360 tablet, Rfl: 1    nebivolol (BYSTOLIC) 10 MG tablet, Take 1 tablet by mouth Daily., Disp: 90 tablet, Rfl: 1    ondansetron ODT (ZOFRAN-ODT) 8 MG disintegrating tablet, Take 1 tablet by mouth Every 8 (Eight) Hours As Needed for Nausea or Vomiting., Disp: 30 tablet, Rfl: 0    oxyCODONE-acetaminophen (PERCOCET)  MG per tablet, Take 1 tablet by mouth Every 4 (Four) Hours As Needed., Disp: 10 tablet, Rfl: 0    QUEtiapine (SEROquel) 25 MG tablet, Take 1 tablet by mouth As Needed., Disp: , Rfl:     Sod Picosulfate-Mag Ox-Cit Acd 10-3.5-12 MG-GM -GM/160ML solution, Take 350 mL by mouth Take As Directed for 1 dose., Disp: 350 mL, Rfl: 0    tadalafil (Cialis) 20 MG tablet, Take 1 tablet by mouth Daily As Needed for Erectile Dysfunction., Disp: 30 tablet, Rfl: 2    valACYclovir (VALTREX) 1000 MG tablet, TAKE 1 TABLET BY MOUTH EVERY DAY, Disp: 30 tablet, Rfl: 0    zolpidem (AMBIEN) 5 MG tablet, TAKE ONE TABLET BY MOUTH NIGHTLY AT BEDTIME AS NEEDED, Disp: 30 tablet, Rfl: 3    Assessment & Plan   Diagnoses and all orders for this visit:    1. Acute bilateral low back pain without sciatica (Primary)  -     ketorolac (TORADOL) injection 30 mg  -Chronic with flare    2. Benign essential HTN  -     losartan (COZAAR) 100 MG tablet; Take 1 tablet by mouth Daily.  Dispense: 90 tablet;  Refill: 3  -I bit elevated today though usually well-controlled, continue current medications             Shabnam Zimmer MD

## 2024-08-13 ENCOUNTER — OFFICE VISIT (OUTPATIENT)
Dept: INTERNAL MEDICINE | Facility: CLINIC | Age: 59
End: 2024-08-13
Payer: MEDICARE

## 2024-08-13 VITALS
OXYGEN SATURATION: 95 % | SYSTOLIC BLOOD PRESSURE: 138 MMHG | BODY MASS INDEX: 33.04 KG/M2 | TEMPERATURE: 98.6 F | HEIGHT: 71 IN | DIASTOLIC BLOOD PRESSURE: 80 MMHG | HEART RATE: 68 BPM | WEIGHT: 236 LBS

## 2024-08-13 DIAGNOSIS — R10.9 ABDOMINAL CRAMPS: ICD-10-CM

## 2024-08-13 DIAGNOSIS — R11.2 NAUSEA AND VOMITING, UNSPECIFIED VOMITING TYPE: ICD-10-CM

## 2024-08-13 DIAGNOSIS — R19.7 DIARRHEA OF PRESUMED INFECTIOUS ORIGIN: Primary | ICD-10-CM

## 2024-08-13 LAB — C DIFF TOX GENS STL QL NAA+PROBE: NOT DETECTED

## 2024-08-13 PROCEDURE — 1125F AMNT PAIN NOTED PAIN PRSNT: CPT | Performed by: INTERNAL MEDICINE

## 2024-08-13 PROCEDURE — 99213 OFFICE O/P EST LOW 20 MIN: CPT | Performed by: INTERNAL MEDICINE

## 2024-08-13 PROCEDURE — 87427 SHIGA-LIKE TOXIN AG IA: CPT | Performed by: INTERNAL MEDICINE

## 2024-08-13 PROCEDURE — 3079F DIAST BP 80-89 MM HG: CPT | Performed by: INTERNAL MEDICINE

## 2024-08-13 PROCEDURE — 87045 FECES CULTURE AEROBIC BACT: CPT | Performed by: INTERNAL MEDICINE

## 2024-08-13 PROCEDURE — 87046 STOOL CULTR AEROBIC BACT EA: CPT | Performed by: INTERNAL MEDICINE

## 2024-08-13 PROCEDURE — 1159F MED LIST DOCD IN RCRD: CPT | Performed by: INTERNAL MEDICINE

## 2024-08-13 PROCEDURE — 3075F SYST BP GE 130 - 139MM HG: CPT | Performed by: INTERNAL MEDICINE

## 2024-08-13 PROCEDURE — 87493 C DIFF AMPLIFIED PROBE: CPT | Performed by: INTERNAL MEDICINE

## 2024-08-13 PROCEDURE — 1160F RVW MEDS BY RX/DR IN RCRD: CPT | Performed by: INTERNAL MEDICINE

## 2024-08-13 RX ORDER — DICYCLOMINE HYDROCHLORIDE 10 MG/1
10 CAPSULE ORAL
Qty: 28 CAPSULE | Refills: 0 | Status: SHIPPED | OUTPATIENT
Start: 2024-08-13

## 2024-08-13 NOTE — PROGRESS NOTES
"Subjective   Raj Kuhn is a 58 y.o. male here for diarrhea, gas, bloating, belching, nausea since Saturday. Has had a few liquid BMs but has had 5 already today. Feels fatigued and weak. No fever or chills. No sick contacts. No hx cdiff no recent abx usage.    I have reviewed the patient's relevant medical history and confirmed it is accurate.    I have personally reviewed and performed the ROS. Shabnam Zimmer MD     Objective   /80 (BP Location: Left arm)   Pulse 68   Temp 98.6 °F (37 °C)   Ht 180.3 cm (71\")   Wt 107 kg (236 lb)   SpO2 95%   BMI 32.92 kg/m²     Physical Exam  Constitutional:       Appearance: He is well-developed.   Pulmonary:      Effort: Pulmonary effort is normal.   Neurological:      General: No focal deficit present.      Mental Status: He is alert.   Psychiatric:         Behavior: Behavior normal.         Thought Content: Thought content normal.         Judgment: Judgment normal.       Current Outpatient Medications:   •  aspirin 81 MG chewable tablet, Chew 1 tablet Daily., Disp: , Rfl:   •  clindamycin (CLEOCIN T) 1 % external solution, APPLY EXTERNALLY TO AFFECTED AREA TWO TIMES A DAY, Disp: 60 mL, Rfl: 3  •  diazePAM (VALIUM) 10 MG tablet, Take 1 tablet by mouth Every 8 (Eight) Hours As Needed for Anxiety. for anxiety, Disp: 90 tablet, Rfl: 2  •  docusate sodium (COLACE) 250 MG capsule, Take 1 capsule by mouth Daily., Disp: 90 capsule, Rfl: 3  •  escitalopram (LEXAPRO) 20 MG tablet, Take 1 tablet by mouth Daily. 200001, Disp: 90 tablet, Rfl: 3  •  esomeprazole (nexIUM) 40 MG capsule, TAKE 1 CAPSULE BY MOUTH IN THE MORNING BEFORE BREAKFAST, Disp: 90 capsule, Rfl: 2  •  famotidine (PEPCID) 20 MG tablet, TAKE 1 TABLET BY MOUTH 2 TIMES A DAY (Patient taking differently: 1 tablet Daily.), Disp: 60 tablet, Rfl: 5  •  fluticasone (Flonase) 50 MCG/ACT nasal spray, 2 sprays into the nostril(s) as directed by provider Daily., Disp: 15.8 mL, Rfl: 0  •  gabapentin " (NEURONTIN) 600 MG tablet, Take 2 tablets by mouth 3 (Three) Times a Day. (Patient taking differently: Take 1 tablet by mouth 3 (Three) Times a Day.), Disp: 60 tablet, Rfl: 2  •  hydroCHLOROthiazide 25 MG tablet, Take 0.5 tablets by mouth Daily. 200001, Disp: 45 tablet, Rfl: 1  •  Ketoconazole 2 % gel, Use twice daily over affected areas., Disp: 45 g, Rfl: 0  •  losartan (COZAAR) 100 MG tablet, Take 1 tablet by mouth Daily., Disp: 90 tablet, Rfl: 3  •  methocarbamol (ROBAXIN) 750 MG tablet, Take 1 tablet by mouth 4 (Four) Times a Day As Needed for Muscle Spasms., Disp: 360 tablet, Rfl: 1  •  nebivolol (BYSTOLIC) 10 MG tablet, Take 1 tablet by mouth Daily., Disp: 90 tablet, Rfl: 1  •  ondansetron ODT (ZOFRAN-ODT) 8 MG disintegrating tablet, Take 1 tablet by mouth Every 8 (Eight) Hours As Needed for Nausea or Vomiting., Disp: 30 tablet, Rfl: 0  •  oxyCODONE-acetaminophen (PERCOCET)  MG per tablet, Take 1 tablet by mouth Every 4 (Four) Hours As Needed., Disp: 10 tablet, Rfl: 0  •  QUEtiapine (SEROquel) 25 MG tablet, Take 1 tablet by mouth As Needed., Disp: , Rfl:   •  Sod Picosulfate-Mag Ox-Cit Acd 10-3.5-12 MG-GM -GM/160ML solution, Take 350 mL by mouth Take As Directed for 1 dose., Disp: 350 mL, Rfl: 0  •  tadalafil (Cialis) 20 MG tablet, Take 1 tablet by mouth Daily As Needed for Erectile Dysfunction., Disp: 30 tablet, Rfl: 2  •  valACYclovir (VALTREX) 1000 MG tablet, TAKE 1 TABLET BY MOUTH EVERY DAY, Disp: 30 tablet, Rfl: 0  •  zolpidem (AMBIEN) 5 MG tablet, TAKE ONE TABLET BY MOUTH NIGHTLY AT BEDTIME AS NEEDED, Disp: 30 tablet, Rfl: 3    Assessment & Plan   Diagnoses and all orders for this visit:    1. Diarrhea of presumed infectious origin (Primary)  -     Stool Culture (Reference Lab) - Stool, Per Rectum; Future  -     Clostridioides difficile Toxin, PCR - Stool, Per Rectum; Future  -advised pushing fluids, particularly with regular gatorade/powerade    2. Nausea  -related to #1    3. Abdominal cramps  -      dicyclomine (BENTYL) 10 MG capsule; Take 1 capsule by mouth 4 (Four) Times a Day Before Meals & at Bedtime.  Dispense: 28 capsule; Refill: 0             Shabnam Zimmer MD

## 2024-08-15 DIAGNOSIS — R11.0 NAUSEA: ICD-10-CM

## 2024-08-15 RX ORDER — ONDANSETRON 8 MG/1
8 TABLET, ORALLY DISINTEGRATING ORAL EVERY 8 HOURS PRN
Qty: 30 TABLET | Refills: 0 | Status: SHIPPED | OUTPATIENT
Start: 2024-08-15

## 2024-08-19 LAB
BACTERIA SPEC CULT: NORMAL
BACTERIA SPEC CULT: NORMAL
CAMPYLOBACTER STL CULT: NORMAL
E COLI SXT STL QL IA: NEGATIVE
SALM + SHIG STL CULT: NORMAL

## 2024-08-28 DIAGNOSIS — F41.9 ANXIETY: ICD-10-CM

## 2024-08-28 RX ORDER — DIAZEPAM 10 MG
10 TABLET ORAL EVERY 8 HOURS PRN
Qty: 90 TABLET | Refills: 2 | Status: SHIPPED | OUTPATIENT
Start: 2024-08-28

## 2024-08-28 RX ORDER — DIAZEPAM 10 MG
10 TABLET ORAL EVERY 8 HOURS PRN
Qty: 90 TABLET | Refills: 2 | Status: CANCELLED | OUTPATIENT
Start: 2024-08-28

## 2024-09-17 ENCOUNTER — OUTSIDE FACILITY SERVICE (OUTPATIENT)
Dept: GASTROENTEROLOGY | Facility: CLINIC | Age: 59
End: 2024-09-17
Payer: MEDICARE

## 2024-09-17 PROCEDURE — 88305 TISSUE EXAM BY PATHOLOGIST: CPT | Performed by: INTERNAL MEDICINE

## 2024-09-18 ENCOUNTER — LAB REQUISITION (OUTPATIENT)
Dept: LAB | Facility: HOSPITAL | Age: 59
End: 2024-09-18
Payer: MEDICARE

## 2024-09-18 DIAGNOSIS — Z12.11 ENCOUNTER FOR SCREENING FOR MALIGNANT NEOPLASM OF COLON: ICD-10-CM

## 2024-09-19 ENCOUNTER — TELEPHONE (OUTPATIENT)
Dept: GASTROENTEROLOGY | Facility: CLINIC | Age: 59
End: 2024-09-19
Payer: MEDICARE

## 2024-10-02 DIAGNOSIS — A60.01 HERPES SIMPLEX INFECTION OF PENIS: ICD-10-CM

## 2024-10-02 DIAGNOSIS — I10 BENIGN ESSENTIAL HTN: ICD-10-CM

## 2024-10-03 RX ORDER — VALACYCLOVIR HYDROCHLORIDE 1 G/1
TABLET, FILM COATED ORAL
Qty: 3090 TABLET | Refills: 1 | Status: SHIPPED | OUTPATIENT
Start: 2024-10-03

## 2024-10-03 RX ORDER — NEBIVOLOL 10 MG/1
10 TABLET ORAL DAILY
Qty: 90 TABLET | Refills: 1 | Status: SHIPPED | OUTPATIENT
Start: 2024-10-03

## 2024-10-03 RX ORDER — FAMOTIDINE 20 MG/1
TABLET, FILM COATED ORAL
Qty: 180 TABLET | Refills: 1 | Status: SHIPPED | OUTPATIENT
Start: 2024-10-03

## 2024-10-23 DIAGNOSIS — F51.01 PRIMARY INSOMNIA: ICD-10-CM

## 2024-10-23 RX ORDER — ZOLPIDEM TARTRATE 5 MG/1
TABLET ORAL
Qty: 30 TABLET | Refills: 3 | Status: SHIPPED | OUTPATIENT
Start: 2024-10-23

## 2024-11-19 DIAGNOSIS — K21.9 GASTROESOPHAGEAL REFLUX DISEASE WITHOUT ESOPHAGITIS: ICD-10-CM

## 2024-11-19 RX ORDER — ESOMEPRAZOLE MAGNESIUM 40 MG/1
CAPSULE, DELAYED RELEASE ORAL
Qty: 90 CAPSULE | Refills: 0 | Status: SHIPPED | OUTPATIENT
Start: 2024-11-19

## 2024-11-20 ENCOUNTER — OFFICE VISIT (OUTPATIENT)
Dept: INTERNAL MEDICINE | Facility: CLINIC | Age: 59
End: 2024-11-20
Payer: MEDICARE

## 2024-11-20 ENCOUNTER — LAB (OUTPATIENT)
Dept: INTERNAL MEDICINE | Facility: CLINIC | Age: 59
End: 2024-11-20
Payer: MEDICARE

## 2024-11-20 VITALS
HEART RATE: 47 BPM | WEIGHT: 230 LBS | SYSTOLIC BLOOD PRESSURE: 142 MMHG | DIASTOLIC BLOOD PRESSURE: 72 MMHG | HEIGHT: 71 IN | OXYGEN SATURATION: 97 % | BODY MASS INDEX: 32.2 KG/M2

## 2024-11-20 DIAGNOSIS — I10 BENIGN ESSENTIAL HTN: ICD-10-CM

## 2024-11-20 DIAGNOSIS — Z12.5 PROSTATE CANCER SCREENING: ICD-10-CM

## 2024-11-20 DIAGNOSIS — F41.9 ANXIETY: ICD-10-CM

## 2024-11-20 DIAGNOSIS — E55.9 VITAMIN D DEFICIENCY: ICD-10-CM

## 2024-11-20 DIAGNOSIS — Z79.899 MEDICATION MANAGEMENT: ICD-10-CM

## 2024-11-20 DIAGNOSIS — Z00.00 MEDICARE ANNUAL WELLNESS VISIT, SUBSEQUENT: Primary | ICD-10-CM

## 2024-11-20 DIAGNOSIS — R53.83 OTHER FATIGUE: ICD-10-CM

## 2024-11-20 LAB
25(OH)D3 SERPL-MCNC: 51.4 NG/ML (ref 30–100)
ALBUMIN SERPL-MCNC: 4.8 G/DL (ref 3.5–5.2)
ALBUMIN/GLOB SERPL: 1.7 G/DL
ALP SERPL-CCNC: 69 U/L (ref 39–117)
ALT SERPL W P-5'-P-CCNC: 34 U/L (ref 1–41)
ANION GAP SERPL CALCULATED.3IONS-SCNC: 12 MMOL/L (ref 5–15)
AST SERPL-CCNC: 31 U/L (ref 1–40)
BILIRUB SERPL-MCNC: 0.7 MG/DL (ref 0–1.2)
BUN SERPL-MCNC: 17 MG/DL (ref 8–23)
BUN/CREAT SERPL: 15 (ref 7–25)
CALCIUM SPEC-SCNC: 10.2 MG/DL (ref 8.6–10.5)
CHLORIDE SERPL-SCNC: 95 MMOL/L (ref 98–107)
CHOLEST SERPL-MCNC: 203 MG/DL (ref 0–200)
CO2 SERPL-SCNC: 28 MMOL/L (ref 22–29)
CREAT SERPL-MCNC: 1.13 MG/DL (ref 0.76–1.27)
EGFRCR SERPLBLD CKD-EPI 2021: 74.4 ML/MIN/1.73
GLOBULIN UR ELPH-MCNC: 2.8 GM/DL
GLUCOSE SERPL-MCNC: 96 MG/DL (ref 65–99)
HDLC SERPL-MCNC: 37 MG/DL (ref 40–60)
LDLC SERPL CALC-MCNC: 130 MG/DL (ref 0–100)
LDLC/HDLC SERPL: 3.39 {RATIO}
POTASSIUM SERPL-SCNC: 3.9 MMOL/L (ref 3.5–5.2)
PROT SERPL-MCNC: 7.6 G/DL (ref 6–8.5)
PSA SERPL-MCNC: 1.28 NG/ML (ref 0–4)
SODIUM SERPL-SCNC: 135 MMOL/L (ref 136–145)
TRIGL SERPL-MCNC: 203 MG/DL (ref 0–150)
TSH SERPL DL<=0.05 MIU/L-ACNC: 0.89 UIU/ML (ref 0.27–4.2)
VLDLC SERPL-MCNC: 36 MG/DL (ref 5–40)

## 2024-11-20 PROCEDURE — 82306 VITAMIN D 25 HYDROXY: CPT | Performed by: INTERNAL MEDICINE

## 2024-11-20 PROCEDURE — 3078F DIAST BP <80 MM HG: CPT | Performed by: INTERNAL MEDICINE

## 2024-11-20 PROCEDURE — 96160 PT-FOCUSED HLTH RISK ASSMT: CPT | Performed by: INTERNAL MEDICINE

## 2024-11-20 PROCEDURE — 80061 LIPID PANEL: CPT | Performed by: INTERNAL MEDICINE

## 2024-11-20 PROCEDURE — 84443 ASSAY THYROID STIM HORMONE: CPT | Performed by: INTERNAL MEDICINE

## 2024-11-20 PROCEDURE — 99396 PREV VISIT EST AGE 40-64: CPT | Performed by: INTERNAL MEDICINE

## 2024-11-20 PROCEDURE — 84403 ASSAY OF TOTAL TESTOSTERONE: CPT | Performed by: INTERNAL MEDICINE

## 2024-11-20 PROCEDURE — 36415 COLL VENOUS BLD VENIPUNCTURE: CPT | Performed by: INTERNAL MEDICINE

## 2024-11-20 PROCEDURE — G0439 PPPS, SUBSEQ VISIT: HCPCS | Performed by: INTERNAL MEDICINE

## 2024-11-20 PROCEDURE — 85027 COMPLETE CBC AUTOMATED: CPT | Performed by: INTERNAL MEDICINE

## 2024-11-20 PROCEDURE — 80053 COMPREHEN METABOLIC PANEL: CPT | Performed by: INTERNAL MEDICINE

## 2024-11-20 PROCEDURE — 1125F AMNT PAIN NOTED PAIN PRSNT: CPT | Performed by: INTERNAL MEDICINE

## 2024-11-20 PROCEDURE — 84402 ASSAY OF FREE TESTOSTERONE: CPT | Performed by: INTERNAL MEDICINE

## 2024-11-20 PROCEDURE — 3077F SYST BP >= 140 MM HG: CPT | Performed by: INTERNAL MEDICINE

## 2024-11-20 PROCEDURE — G0103 PSA SCREENING: HCPCS | Performed by: INTERNAL MEDICINE

## 2024-11-20 RX ORDER — DIAZEPAM 10 MG/1
10 TABLET ORAL EVERY 8 HOURS PRN
Qty: 90 TABLET | Refills: 2 | Status: SHIPPED | OUTPATIENT
Start: 2024-11-20

## 2024-11-20 RX ORDER — MELOXICAM 15 MG/1
15 TABLET ORAL DAILY
COMMUNITY
Start: 2020-12-08

## 2024-11-20 RX ORDER — DIAZEPAM 10 MG/1
10 TABLET ORAL EVERY 8 HOURS PRN
Qty: 90 TABLET | Refills: 2 | Status: SHIPPED | OUTPATIENT
Start: 2024-11-20 | End: 2024-11-20 | Stop reason: SDUPTHER

## 2024-11-20 NOTE — PROGRESS NOTES
The ABCs of the Annual Wellness Visit  Subsequent Medicare Wellness Visit    Chief Complaint   Patient presents with    Medicare Wellness-subsequent      Subjective    History of Present Illness:  Raj Kuhn is a 59 y.o. male who presents for a Subsequent Medicare Wellness Visit.    The following portions of the patient's history were reviewed and   updated as appropriate: allergies, current medications, past medical history, past social history, past surgical history, and problem list.     Compared to one year ago, the patient feels his physical   health is the same.    Compared to one year ago, the patient feels his mental   health is the same.    Recent Hospitalizations:  He was not admitted to the hospital during the last year.       Current Medical Providers:  Patient Care Team:  Shabnam Zimmer MD as PCP - General (Internal Medicine)    Outpatient Medications Prior to Visit   Medication Sig Dispense Refill    aspirin 81 MG chewable tablet Chew 1 tablet Daily.      clindamycin (CLEOCIN T) 1 % external solution APPLY EXTERNALLY TO AFFECTED AREA TWO TIMES A DAY 60 mL 3    diazePAM (VALIUM) 10 MG tablet TAKE ONE TABLET BY MOUTH EVERY 8 HOURS AS NEEDED FOR ANXIETY 90 tablet 2    docusate sodium (COLACE) 250 MG capsule Take 1 capsule by mouth Daily. 90 capsule 3    escitalopram (LEXAPRO) 20 MG tablet Take 1 tablet by mouth Daily. 200001 90 tablet 3    esomeprazole (nexIUM) 40 MG capsule TAKE 1 CAPSULE BY MOUTH IN THE MORNING BEFORE BREAKFAST 90 capsule 0    famotidine (PEPCID) 20 MG tablet TAKE 1 TABLET BY MOUTH 2 TIMES A  tablet 1    fluticasone (Flonase) 50 MCG/ACT nasal spray 2 sprays into the nostril(s) as directed by provider Daily. 15.8 mL 0    gabapentin (NEURONTIN) 600 MG tablet Take 2 tablets by mouth 3 (Three) Times a Day. (Patient taking differently: Take 1 tablet by mouth 3 (Three) Times a Day.) 60 tablet 2    hydroCHLOROthiazide 25 MG tablet Take 0.5 tablets by mouth Daily.  200001 45 tablet 1    Ketoconazole 2 % gel Use twice daily over affected areas. 45 g 0    losartan (COZAAR) 100 MG tablet Take 1 tablet by mouth Daily. 90 tablet 3    meloxicam (Mobic) 15 MG tablet 1 tablet Daily.      methocarbamol (ROBAXIN) 750 MG tablet Take 1 tablet by mouth 4 (Four) Times a Day As Needed for Muscle Spasms. 360 tablet 1    nebivolol (BYSTOLIC) 10 MG tablet TAKE 1 TABLET BY MOUTH EVERY DAY 90 tablet 1    ondansetron ODT (ZOFRAN-ODT) 8 MG disintegrating tablet Take 1 tablet by mouth Every 8 (Eight) Hours As Needed for Nausea or Vomiting. 30 tablet 0    oxyCODONE-acetaminophen (PERCOCET)  MG per tablet Take 1 tablet by mouth Every 4 (Four) Hours As Needed. 10 tablet 0    QUEtiapine (SEROquel) 25 MG tablet Take 1 tablet by mouth As Needed.      tadalafil (Cialis) 20 MG tablet Take 1 tablet by mouth Daily As Needed for Erectile Dysfunction. 30 tablet 2    valACYclovir (VALTREX) 1000 MG tablet TAKE 1 TABLET BY MOUTH EVERY DAY 3090 tablet 1    zolpidem (AMBIEN) 5 MG tablet TAKE ONE TABLET BY MOUTH NIGHTLY AT BEDTIME AS NEEDED 30 tablet 3    dicyclomine (BENTYL) 10 MG capsule Take 1 capsule by mouth 4 (Four) Times a Day Before Meals & at Bedtime. (Patient not taking: Reported on 11/20/2024) 28 capsule 0    Sod Picosulfate-Mag Ox-Cit Acd 10-3.5-12 MG-GM -GM/160ML solution Take 350 mL by mouth Take As Directed for 1 dose. (Patient not taking: Reported on 11/20/2024) 350 mL 0     No facility-administered medications prior to visit.       Opioid medication/s are on active medication list.  and I have evaluated his active treatment plan and pain score trends (see table).  Vitals:    11/20/24 1352   PainSc:   5   PainLoc: Knee     I have reviewed the chart for potential of high risk medication and harmful drug interactions in the elderly.          Aspirin is on active medication list. Aspirin use is indicated based on review of current medical condition/s. Pros and cons of this therapy have been  "discussed today. Benefits of this medication outweigh potential harm.  Patient has been encouraged to continue taking this medication.  .      Patient Active Problem List   Diagnosis    Benign essential HTN    Anxiety and depression    Gastroesophageal reflux disease without esophagitis    Chronic joint pain    Panic attack    Morbidly obese     Advance Care Planning   Advance Directive is not on file.  ACP discussion was declined by the patient. Patient does not have an advance directive, declines further assistance.       Objective       Vitals:    11/20/24 1352   BP: 142/72   BP Location: Left arm   Pulse: (!) 47   SpO2: 97%   Weight: 104 kg (230 lb)   Height: 180.3 cm (71\")   PainSc:   5   PainLoc: Knee     BMI Readings from Last 1 Encounters:   11/20/24 32.08 kg/m²   BMI is within normal parameters. No follow-up required.  BMI Readings from Last 1 Encounters:   11/20/24 32.08 kg/m²   BMI is above normal parameters. Recommendations include: Information on healthy weight added to patient's after visit summary    Does the patient have evidence of cognitive impairment? No    Physical Exam  Vitals reviewed.   Constitutional:       General: He is not in acute distress.     Appearance: Normal appearance. He is well-developed.   HENT:      Head: Normocephalic and atraumatic.      Right Ear: Tympanic membrane, ear canal and external ear normal.      Left Ear: Tympanic membrane, ear canal and external ear normal.      Nose: Nose normal.      Mouth/Throat:      Lips: Pink.      Mouth: Mucous membranes are moist.      Tongue: No lesions.      Pharynx: Oropharynx is clear.   Eyes:      General: Lids are normal. Vision grossly intact. No scleral icterus.     Conjunctiva/sclera: Conjunctivae normal.      Pupils: Pupils are equal, round, and reactive to light.   Neck:      Thyroid: No thyroid mass, thyromegaly or thyroid tenderness.      Vascular: No carotid bruit.   Cardiovascular:      Rate and Rhythm: Normal rate and " regular rhythm.      Heart sounds: Normal heart sounds. No murmur heard.     No friction rub. No gallop. No S3 or S4 sounds.   Pulmonary:      Effort: Pulmonary effort is normal.      Breath sounds: Normal breath sounds. No wheezing, rhonchi or rales.   Abdominal:      General: Bowel sounds are normal. There is no distension.      Palpations: Abdomen is soft. There is no mass.      Tenderness: There is no abdominal tenderness.   Musculoskeletal:         General: Normal range of motion.      Cervical back: Neck supple.      Right lower leg: No edema.      Left lower leg: No edema.   Lymphadenopathy:      Cervical: No cervical adenopathy.   Skin:     General: Skin is warm and dry.      Coloration: Skin is not pale.      Findings: No erythema or rash.   Neurological:      Mental Status: He is alert and oriented to person, place, and time. Mental status is at baseline.      Cranial Nerves: No cranial nerve deficit.      Sensory: No sensory deficit.      Gait: Gait is intact.      Comments: CNs grossly intact. Balance grossly intact.   Psychiatric:         Attention and Perception: Attention normal.         Mood and Affect: Mood normal.         Speech: Speech normal.         Behavior: Behavior normal.         Thought Content: Thought content normal.         Judgment: Judgment normal.                 HEALTH RISK ASSESSMENT    Smoking Status:  Social History     Tobacco Use   Smoking Status Never    Passive exposure: Never   Smokeless Tobacco Never   Tobacco Comments    no use ever     Alcohol Consumption:  Social History     Substance and Sexual Activity   Alcohol Use Yes    Alcohol/week: 8.0 - 11.0 standard drinks of alcohol    Types: 7 - 10 Cans of beer, 1 Shots of liquor per week    Comment: occ     Fall Risk Screen:    [facility] Fall Risk Assessment was completed, and patient is at HIGH risk for falls. Assessment completed on:2024    Depression Screenin/20/2024     2:00 PM   PHQ-2/PHQ-9 Depression  Screening   Little interest or pleasure in doing things Not at all   Feeling down, depressed, or hopeless Not at all   Trouble falling or staying asleep, or sleeping too much Several days   Feeling tired or having little energy Over half   Poor appetite or overeating Not at all   Feeling bad about yourself - or that you are a failure or have let yourself or your family down Not at all   Trouble concentrating on things, such as reading the newspaper or watching television Not at all   Moving or speaking so slowly that other people could have noticed? Or the opposite - being so fidgety or restless that you have been moving around a lot more than usual. Not at all   Thoughts that you would be better off dead or hurting yourself in some way Not at all   Patient Health Questionnaire-9 Score 3   How difficult have these problems made it for you to do your work, take care of things at home, or get along with other people? Not difficult at all       Health Habits and Functional and Cognitive Screenin/13/2024     4:25 PM   Functional & Cognitive Status   Do you have difficulty preparing food and eating? No    Do you have difficulty bathing yourself, getting dressed or grooming yourself? No    Do you have difficulty using the toilet? No    Do you have difficulty moving around from place to place? Yes    Do you have trouble with steps or getting out of a bed or a chair? Yes    Current Diet Low Carb Diet    Dental Exam Up to date    Eye Exam Not up to date    Exercise (times per week) 2 times per week    Current Exercises Include Cardiovascular Workout;Light Weights;Walking    Do you need help using the phone?  No    Are you deaf or do you have serious difficulty hearing?  No    Do you need help to go to places out of walking distance? No    Do you need help shopping? No    Do you need help preparing meals?  No    Do you need help with housework?  Yes    Do you need help with laundry? Yes    Do you need help taking  your medications? No    Do you need help managing money? No    Do you ever drive or ride in a car without wearing a seat belt? No    Have you felt unusual stress, anger or loneliness in the last month? No    Who do you live with? Alone    If you need help, do you have trouble finding someone available to you? No    Have you been bothered in the last four weeks by sexual problems? No    Do you have difficulty concentrating, remembering or making decisions? No        Patient-reported       Age-appropriate Screening Schedule:  Refer to the list below for future screening recommendations based on patient's age, sex and/or medical conditions. Orders for these recommended tests are listed in the plan section. The patient has been provided with a written plan.    Health Maintenance   Topic Date Due    TDAP/TD VACCINES (1 - Tdap) Never done    ANNUAL WELLNESS VISIT  05/24/2024    COVID-19 Vaccine (3 - 2024-25 season) 09/01/2024    LIPID PANEL  11/15/2024    BMI FOLLOWUP  07/02/2025    COLORECTAL CANCER SCREENING  09/17/2034    HEPATITIS C SCREENING  Completed    INFLUENZA VACCINE  Completed    ZOSTER VACCINE  Completed    Pneumococcal Vaccine 0-64  Aged Out              Assessment & Plan     CMS Preventative Services Quick Reference  Risk Factors Identified During Encounter  Immunizations Discussed/Encouraged: Tdap and COVID19  The above risks/problems have been discussed with the patient.  Follow up actions/plans if indicated are seen below in the Assessment/Plan Section.  Pertinent information has been shared with the patient in the After Visit Summary.    Diagnoses and all orders for this visit:    1. Medicare annual wellness visit, subsequent (Primary)  -Yearly labs as below  -Up-to-date colon cancer screening  -PSA ordered  -Discussed dentist and Derm screenings    2. Prostate cancer screening  -     PSA Screen    3. Benign essential HTN  -     CBC (No Diff)  -     Comprehensive Metabolic Panel  -     Lipid Panel    4.  Vitamin D deficiency  -     Vitamin D,25-Hydroxy    5. Other fatigue  -     TSH Rfx On Abnormal To Free T4  -     Testosterone (Free & Total), LC / MS    6. Anxiety  -     diazePAM (VALIUM) 10 MG tablet; Take 1 tablet by mouth Every 8 (Eight) Hours As Needed for Anxiety. for anxiety  Dispense: 90 tablet; Refill: 2        Follow Up:   No follow-ups on file.     An After Visit Summary and PPPS were given to the patient.                General

## 2024-11-21 LAB
DEPRECATED RDW RBC AUTO: 45.7 FL (ref 37–54)
ERYTHROCYTE [DISTWIDTH] IN BLOOD BY AUTOMATED COUNT: 13.7 % (ref 12.3–15.4)
HCT VFR BLD AUTO: 49.7 % (ref 37.5–51)
HGB BLD-MCNC: 17.8 G/DL (ref 13–17.7)
MCH RBC QN AUTO: 32.9 PG (ref 26.6–33)
MCHC RBC AUTO-ENTMCNC: 35.8 G/DL (ref 31.5–35.7)
MCV RBC AUTO: 91.9 FL (ref 79–97)
PLATELET # BLD AUTO: 213 10*3/MM3 (ref 140–450)
PMV BLD AUTO: 10.5 FL (ref 6–12)
RBC # BLD AUTO: 5.41 10*6/MM3 (ref 4.14–5.8)
WBC NRBC COR # BLD AUTO: 7.81 10*3/MM3 (ref 3.4–10.8)

## 2024-11-26 LAB
TESTOST FREE SERPL-MCNC: 1.9 PG/ML (ref 6.6–18.1)
TESTOST SERPL-MCNC: 238 NG/DL (ref 264–916)

## 2024-11-27 DIAGNOSIS — R79.89 LOW TESTOSTERONE: Primary | ICD-10-CM

## 2024-11-29 LAB — DRUGS UR: NORMAL

## 2024-12-02 ENCOUNTER — LAB (OUTPATIENT)
Dept: INTERNAL MEDICINE | Facility: CLINIC | Age: 59
End: 2024-12-02
Payer: MEDICARE

## 2024-12-02 PROCEDURE — 84403 ASSAY OF TOTAL TESTOSTERONE: CPT | Performed by: INTERNAL MEDICINE

## 2024-12-02 PROCEDURE — 36415 COLL VENOUS BLD VENIPUNCTURE: CPT | Performed by: INTERNAL MEDICINE

## 2024-12-02 PROCEDURE — 84402 ASSAY OF FREE TESTOSTERONE: CPT | Performed by: INTERNAL MEDICINE

## 2024-12-03 RX ORDER — AZITHROMYCIN 250 MG/1
TABLET, FILM COATED ORAL
Qty: 6 TABLET | Refills: 0 | Status: SHIPPED | OUTPATIENT
Start: 2024-12-03

## 2024-12-07 LAB
TESTOST FREE SERPL-MCNC: 1.3 PG/ML (ref 6.6–18.1)
TESTOST SERPL-MCNC: 153.8 NG/DL (ref 264–916)

## 2024-12-18 ENCOUNTER — OFFICE VISIT (OUTPATIENT)
Dept: INTERNAL MEDICINE | Facility: CLINIC | Age: 59
End: 2024-12-18
Payer: MEDICARE

## 2024-12-18 VITALS
HEIGHT: 71 IN | WEIGHT: 235 LBS | OXYGEN SATURATION: 97 % | SYSTOLIC BLOOD PRESSURE: 136 MMHG | DIASTOLIC BLOOD PRESSURE: 72 MMHG | HEART RATE: 52 BPM | BODY MASS INDEX: 32.9 KG/M2

## 2024-12-18 DIAGNOSIS — A60.01 HERPES SIMPLEX INFECTION OF PENIS: ICD-10-CM

## 2024-12-18 DIAGNOSIS — R79.89 LOW TESTOSTERONE: Primary | ICD-10-CM

## 2024-12-18 DIAGNOSIS — F41.9 ANXIETY AND DEPRESSION: ICD-10-CM

## 2024-12-18 DIAGNOSIS — E78.2 MIXED HYPERLIPIDEMIA: ICD-10-CM

## 2024-12-18 DIAGNOSIS — F32.A ANXIETY AND DEPRESSION: ICD-10-CM

## 2024-12-18 DIAGNOSIS — Z79.2 NEED FOR ANTIBIOTIC PROPHYLAXIS FOR DENTAL PROCEDURE: ICD-10-CM

## 2024-12-18 PROCEDURE — 99214 OFFICE O/P EST MOD 30 MIN: CPT | Performed by: INTERNAL MEDICINE

## 2024-12-18 PROCEDURE — 3075F SYST BP GE 130 - 139MM HG: CPT | Performed by: INTERNAL MEDICINE

## 2024-12-18 PROCEDURE — 1125F AMNT PAIN NOTED PAIN PRSNT: CPT | Performed by: INTERNAL MEDICINE

## 2024-12-18 PROCEDURE — 3078F DIAST BP <80 MM HG: CPT | Performed by: INTERNAL MEDICINE

## 2024-12-18 RX ORDER — SYRINGE-NEEDLE,INSULIN,0.5 ML 27GX1/2"
SYRINGE, EMPTY DISPOSABLE MISCELLANEOUS
Qty: 100 EACH | Refills: 0 | Status: SHIPPED | OUTPATIENT
Start: 2024-12-18

## 2024-12-18 RX ORDER — ESCITALOPRAM OXALATE 20 MG/1
20 TABLET ORAL DAILY
Qty: 90 TABLET | Refills: 3 | Status: SHIPPED | OUTPATIENT
Start: 2024-12-18

## 2024-12-18 RX ORDER — AMOXICILLIN 500 MG/1
500 CAPSULE ORAL 2 TIMES DAILY
Qty: 4 CAPSULE | Refills: 0 | Status: SHIPPED | OUTPATIENT
Start: 2024-12-18

## 2024-12-18 RX ORDER — TESTOSTERONE ENANTHATE 200 MG/ML
100 INJECTION, SOLUTION INTRAMUSCULAR WEEKLY
Qty: 2 ML | Refills: 2 | Status: SHIPPED | OUTPATIENT
Start: 2024-12-18

## 2024-12-18 RX ORDER — VALACYCLOVIR HYDROCHLORIDE 1 G/1
1000 TABLET, FILM COATED ORAL DAILY
Qty: 3090 TABLET | Refills: 1 | Status: SHIPPED | OUTPATIENT
Start: 2024-12-18

## 2024-12-18 NOTE — PROGRESS NOTES
"Subjective   Raj Kuhn is a 59 y.o. male here for discussion on low testosterone.  He used to be on testosterone supplementation via his PCP but has not been on a number of years.  He is interested in getting restarted on it after 2 low testosterone values.  He also is concerned about his cholesterol reading recently.  Needs some refills and would like amoxicillin for dental prophylaxis.    I have reviewed the patient's relevant medical history and confirmed it is accurate.    I have personally reviewed and performed the ROS. Shabnam Zimmer MD     Objective   /72 (BP Location: Left arm)   Pulse 52   Ht 180.3 cm (71\")   Wt 107 kg (235 lb)   SpO2 97%   BMI 32.78 kg/m²     Physical Exam  Constitutional:       Appearance: He is well-developed.   Pulmonary:      Effort: Pulmonary effort is normal.   Neurological:      General: No focal deficit present.      Mental Status: He is alert.   Psychiatric:         Behavior: Behavior normal.         Thought Content: Thought content normal.         Judgment: Judgment normal.       Current Outpatient Medications:     aspirin 81 MG chewable tablet, Chew 1 tablet Daily., Disp: , Rfl:     clindamycin (CLEOCIN T) 1 % external solution, APPLY EXTERNALLY TO AFFECTED AREA TWO TIMES A DAY, Disp: 60 mL, Rfl: 3    diazePAM (VALIUM) 10 MG tablet, Take 1 tablet by mouth Every 8 (Eight) Hours As Needed for Anxiety. for anxiety, Disp: 90 tablet, Rfl: 2    docusate sodium (COLACE) 250 MG capsule, Take 1 capsule by mouth Daily., Disp: 90 capsule, Rfl: 3    escitalopram (LEXAPRO) 20 MG tablet, Take 1 tablet by mouth Daily. 200001, Disp: 90 tablet, Rfl: 3    esomeprazole (nexIUM) 40 MG capsule, TAKE 1 CAPSULE BY MOUTH IN THE MORNING BEFORE BREAKFAST, Disp: 90 capsule, Rfl: 0    famotidine (PEPCID) 20 MG tablet, TAKE 1 TABLET BY MOUTH 2 TIMES A DAY, Disp: 180 tablet, Rfl: 1    fluticasone (Flonase) 50 MCG/ACT nasal spray, 2 sprays into the nostril(s) as directed by " provider Daily., Disp: 15.8 mL, Rfl: 0    gabapentin (NEURONTIN) 600 MG tablet, Take 2 tablets by mouth 3 (Three) Times a Day. (Patient taking differently: Take 1 tablet by mouth 3 (Three) Times a Day.), Disp: 60 tablet, Rfl: 2    hydroCHLOROthiazide 25 MG tablet, Take 0.5 tablets by mouth Daily. 200001, Disp: 45 tablet, Rfl: 1    Ketoconazole 2 % gel, Use twice daily over affected areas., Disp: 45 g, Rfl: 0    losartan (COZAAR) 100 MG tablet, Take 1 tablet by mouth Daily., Disp: 90 tablet, Rfl: 3    meloxicam (Mobic) 15 MG tablet, 1 tablet Daily., Disp: , Rfl:     methocarbamol (ROBAXIN) 750 MG tablet, Take 1 tablet by mouth 4 (Four) Times a Day As Needed for Muscle Spasms., Disp: 360 tablet, Rfl: 1    nebivolol (BYSTOLIC) 10 MG tablet, TAKE 1 TABLET BY MOUTH EVERY DAY, Disp: 90 tablet, Rfl: 1    ondansetron ODT (ZOFRAN-ODT) 8 MG disintegrating tablet, Take 1 tablet by mouth Every 8 (Eight) Hours As Needed for Nausea or Vomiting., Disp: 30 tablet, Rfl: 0    oxyCODONE-acetaminophen (PERCOCET)  MG per tablet, Take 1 tablet by mouth Every 4 (Four) Hours As Needed., Disp: 10 tablet, Rfl: 0    QUEtiapine (SEROquel) 25 MG tablet, Take 1 tablet by mouth As Needed., Disp: , Rfl:     tadalafil (Cialis) 20 MG tablet, Take 1 tablet by mouth Daily As Needed for Erectile Dysfunction., Disp: 30 tablet, Rfl: 2    valACYclovir (VALTREX) 1000 MG tablet, TAKE 1 TABLET BY MOUTH EVERY DAY, Disp: 3090 tablet, Rfl: 1    zolpidem (AMBIEN) 5 MG tablet, TAKE ONE TABLET BY MOUTH NIGHTLY AT BEDTIME AS NEEDED, Disp: 30 tablet, Rfl: 3    The 10-year ASCVD risk score (Johana CASILLAS, et al., 2019) is: 13.9%    Values used to calculate the score:      Age: 59 years      Sex: Male      Is Non- : No      Diabetic: No      Tobacco smoker: No      Systolic Blood Pressure: 136 mmHg      Is BP treated: Yes      HDL Cholesterol: 37 mg/dL      Total Cholesterol: 203 mg/dL      Assessment & Plan   Diagnoses and all orders for  "this visit:    1. Low testosterone (Primary)  -     Testosterone Enanthate 200 MG/ML solution; Inject 100 mg into the appropriate muscle as directed by prescriber 1 (One) Time Per Week.  Dispense: 2 mL; Refill: 2  -     Insulin Syringe 28G X 1/2\" 0.5 ML misc; Use to inject testosterone.  Dispense: 100 each; Refill: 0    2. Mixed hyperlipidemia  -Discussed ASCVD score.  Patient would like to try diet and exercise and will repeat lipids in the future with hopeful improvement    3. Herpes simplex infection of penis  -     valACYclovir (VALTREX) 1000 MG tablet; Take 1 tablet by mouth Daily.  Dispense: 3090 tablet; Refill: 1    4. Anxiety and depression  -     escitalopram (LEXAPRO) 20 MG tablet; Take 1 tablet by mouth Daily. 200001  Dispense: 90 tablet; Refill: 3    5.  Dental prophylaxis  -     amoxicillin (AMOXIL) 500 MG capsule; Take 1 capsule by mouth 2 (Two) Times a Day.  Dispense: 4 capsule; Refill: 0             Shabnam Zimmer MD      "

## 2025-01-02 ENCOUNTER — TELEMEDICINE (OUTPATIENT)
Dept: INTERNAL MEDICINE | Facility: CLINIC | Age: 60
End: 2025-01-02
Payer: MEDICARE

## 2025-01-02 VITALS — RESPIRATION RATE: 16 BRPM | TEMPERATURE: 100.1 F

## 2025-01-02 DIAGNOSIS — J00 ACUTE NASOPHARYNGITIS: ICD-10-CM

## 2025-01-02 DIAGNOSIS — I10 BENIGN ESSENTIAL HTN: Primary | ICD-10-CM

## 2025-01-02 DIAGNOSIS — K21.9 GASTROESOPHAGEAL REFLUX DISEASE WITHOUT ESOPHAGITIS: ICD-10-CM

## 2025-01-02 DIAGNOSIS — U07.1 COVID-19 VIRUS INFECTION: Primary | ICD-10-CM

## 2025-01-02 PROCEDURE — 1125F AMNT PAIN NOTED PAIN PRSNT: CPT | Performed by: INTERNAL MEDICINE

## 2025-01-02 PROCEDURE — 99214 OFFICE O/P EST MOD 30 MIN: CPT | Performed by: INTERNAL MEDICINE

## 2025-01-02 PROCEDURE — 1160F RVW MEDS BY RX/DR IN RCRD: CPT | Performed by: INTERNAL MEDICINE

## 2025-01-02 PROCEDURE — 1159F MED LIST DOCD IN RCRD: CPT | Performed by: INTERNAL MEDICINE

## 2025-01-02 RX ORDER — CEPHALEXIN 500 MG/1
500 CAPSULE ORAL 2 TIMES DAILY
COMMUNITY
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
## 2025-01-09 DIAGNOSIS — I10 BENIGN ESSENTIAL HTN: ICD-10-CM

## 2025-01-09 RX ORDER — HYDROCHLOROTHIAZIDE 25 MG/1
12.5 TABLET ORAL DAILY
Qty: 45 TABLET | Refills: 0 | Status: SHIPPED | OUTPATIENT
Start: 2025-01-09

## 2025-01-21 ENCOUNTER — TELEMEDICINE (OUTPATIENT)
Dept: INTERNAL MEDICINE | Facility: CLINIC | Age: 60
End: 2025-01-21
Payer: MEDICARE

## 2025-01-21 DIAGNOSIS — M12.9 ARTHRITIS, MULTIPLE JOINT INVOLVEMENT: ICD-10-CM

## 2025-01-21 DIAGNOSIS — F41.9 ANXIETY: Primary | ICD-10-CM

## 2025-01-21 PROCEDURE — 1125F AMNT PAIN NOTED PAIN PRSNT: CPT | Performed by: INTERNAL MEDICINE

## 2025-01-21 PROCEDURE — 99213 OFFICE O/P EST LOW 20 MIN: CPT | Performed by: INTERNAL MEDICINE

## 2025-01-21 RX ORDER — DIAZEPAM 10 MG/1
TABLET ORAL
Start: 2025-01-21

## 2025-01-21 NOTE — PROGRESS NOTES
Mode of Visit: Video  Location of patient: home  [facility] provider: [facility] [place] [facility]  You have chosen to receive care through a telehealth visit.  The patient has signed the video visit consent form.  The visit included audio and video interaction. Technical issues occurred during this visit, did visualize patient but he cannot hear me so had to switch to audio only.    Aime Kuhn is a 59 y.o. male here for discussion on letter from [facility] requiring a decrease in benzodiazepine use daily to an equivalent Klonopin dose of 1 mg.  He is currently on diazepam 10 mg 3 times daily and has been well-maintained on this.  He has been on this dosing for a number of years at this point.  He is willing to decrease his benzodiazepine so that he can continue his opiate therapy.  He is also interested in potentially getting a new pain management that would not require this decrease.    I have reviewed the patient's pertinent history and confirmed it is accurate.    I have personally reviewed and performed the ROS. Shabnam Zimmer MD     Objective     Physical Exam  Constitutional:       Appearance: He is well-developed.   Pulmonary:      Effort: Pulmonary effort is normal.   Neurological:      General: No focal deficit present.      Mental Status: He is alert.   Psychiatric:         Behavior: Behavior normal.         Thought Content: Thought content normal.         Judgment: Judgment normal.         Current Outpatient Medications:     amoxicillin (AMOXIL) 500 MG capsule, Take 1 capsule by mouth 2 (Two) Times a Day. (Patient not taking: Reported on 1/2/2025), Disp: 4 capsule, Rfl: 0    aspirin 81 MG chewable tablet, Chew 1 tablet Daily., Disp: , Rfl:     cephalexin (KEFLEX) 500 MG capsule, Take 1 capsule by mouth 2 (Two) Times a Day., Disp: , Rfl:     clindamycin (CLEOCIN T) 1 % external solution, APPLY EXTERNALLY TO AFFECTED AREA TWO TIMES A DAY, Disp: 60 mL, Rfl: 3    diazePAM  "(VALIUM) 10 MG tablet, Take 1 tablet by mouth Every 8 (Eight) Hours As Needed for Anxiety. for anxiety, Disp: 90 tablet, Rfl: 2    docusate sodium (COLACE) 250 MG capsule, Take 1 capsule by mouth Daily., Disp: 90 capsule, Rfl: 3    escitalopram (LEXAPRO) 20 MG tablet, Take 1 tablet by mouth Daily. 200001, Disp: 90 tablet, Rfl: 3    esomeprazole (nexIUM) 40 MG capsule, TAKE 1 CAPSULE BY MOUTH IN THE MORNING BEFORE BREAKFAST, Disp: 90 capsule, Rfl: 0    famotidine (PEPCID) 20 MG tablet, TAKE 1 TABLET BY MOUTH 2 TIMES A DAY, Disp: 180 tablet, Rfl: 1    fluticasone (Flonase) 50 MCG/ACT nasal spray, 2 sprays into the nostril(s) as directed by provider Daily., Disp: 15.8 mL, Rfl: 0    gabapentin (NEURONTIN) 600 MG tablet, Take 2 tablets by mouth 3 (Three) Times a Day. (Patient taking differently: Take 1 tablet by mouth 3 (Three) Times a Day.), Disp: 60 tablet, Rfl: 2    hydroCHLOROthiazide 25 MG tablet, TAKE 1/2 TABLET BY MOUTH EVERY DAY, Disp: 45 tablet, Rfl: 0    Insulin Syringe 28G X 1/2\" 0.5 ML misc, Use to inject testosterone., Disp: 100 each, Rfl: 0    Ketoconazole 2 % gel, Use twice daily over affected areas., Disp: 45 g, Rfl: 0    losartan (COZAAR) 100 MG tablet, Take 1 tablet by mouth Daily., Disp: 90 tablet, Rfl: 3    meloxicam (Mobic) 15 MG tablet, 1 tablet Daily., Disp: , Rfl:     methocarbamol (ROBAXIN) 750 MG tablet, Take 1 tablet by mouth 4 (Four) Times a Day As Needed for Muscle Spasms., Disp: 360 tablet, Rfl: 1    nebivolol (BYSTOLIC) 10 MG tablet, TAKE 1 TABLET BY MOUTH EVERY DAY, Disp: 90 tablet, Rfl: 1    Nirmatrelvir & Ritonavir, 300mg/100mg, (PAXLOVID), Take 3 tablets by mouth 2 (Two) Times a Day., Disp: 30 each, Rfl: 0    ondansetron ODT (ZOFRAN-ODT) 8 MG disintegrating tablet, Take 1 tablet by mouth Every 8 (Eight) Hours As Needed for Nausea or Vomiting., Disp: 30 tablet, Rfl: 0    oxyCODONE-acetaminophen (PERCOCET)  MG per tablet, Take 1 tablet by mouth Every 4 (Four) Hours As Needed., Disp: " 10 tablet, Rfl: 0    QUEtiapine (SEROquel) 25 MG tablet, Take 1 tablet by mouth As Needed., Disp: , Rfl:     tadalafil (Cialis) 20 MG tablet, Take 1 tablet by mouth Daily As Needed for Erectile Dysfunction., Disp: 30 tablet, Rfl: 2    Testosterone Enanthate 200 MG/ML solution, Inject 100 mg into the appropriate muscle as directed by prescriber 1 (One) Time Per Week., Disp: 2 mL, Rfl: 2    valACYclovir (VALTREX) 1000 MG tablet, Take 1 tablet by mouth Daily., Disp: 3090 tablet, Rfl: 1    zolpidem (AMBIEN) 5 MG tablet, TAKE ONE TABLET BY MOUTH NIGHTLY AT BEDTIME AS NEEDED, Disp: 30 tablet, Rfl: 3    Assessment & Plan   Diagnoses and all orders for this visit:    1. Anxiety (Primary)  -     diazePAM (VALIUM) 10 MG tablet; Take 10mg twice a day and 7.5mg once a day.  -Decrease Valium slowly, will take several months to get to recommended dosing of diazepam 5 mg twice daily  -discussed reasoning for this from [facility] is likely increased risk of respiratory depression/death of those on BDZ+opiate  -Advised him to let me know of any significant psychiatric side effects associated with this decrease, did also discuss withdrawal and potential impact on mood    2. Arthritis, multiple joint involvement  -     Ambulatory Referral to Pain Management             Shabnam Zimmer MD

## 2025-02-12 DIAGNOSIS — F51.01 PRIMARY INSOMNIA: ICD-10-CM

## 2025-02-12 RX ORDER — ZOLPIDEM TARTRATE 5 MG/1
TABLET ORAL
Qty: 30 TABLET | Refills: 4 | Status: SHIPPED | OUTPATIENT
Start: 2025-02-12

## 2025-02-17 DIAGNOSIS — K21.9 GASTROESOPHAGEAL REFLUX DISEASE WITHOUT ESOPHAGITIS: ICD-10-CM

## 2025-02-17 RX ORDER — ESOMEPRAZOLE MAGNESIUM 40 MG/1
CAPSULE, DELAYED RELEASE ORAL
Qty: 90 CAPSULE | Refills: 0 | Status: SHIPPED | OUTPATIENT
Start: 2025-02-17

## 2025-02-24 ENCOUNTER — OFFICE VISIT (OUTPATIENT)
Dept: INTERNAL MEDICINE | Facility: CLINIC | Age: 60
End: 2025-02-24
Payer: MEDICARE

## 2025-02-24 VITALS
DIASTOLIC BLOOD PRESSURE: 86 MMHG | HEART RATE: 58 BPM | WEIGHT: 222 LBS | OXYGEN SATURATION: 99 % | HEIGHT: 71 IN | SYSTOLIC BLOOD PRESSURE: 160 MMHG | BODY MASS INDEX: 31.08 KG/M2

## 2025-02-24 DIAGNOSIS — G89.29 CHRONIC JOINT PAIN: ICD-10-CM

## 2025-02-24 DIAGNOSIS — F41.9 ANXIETY: Primary | ICD-10-CM

## 2025-02-24 DIAGNOSIS — M25.50 CHRONIC JOINT PAIN: ICD-10-CM

## 2025-02-24 NOTE — PROGRESS NOTES
"Subjective   Raj Kuhn is a 59 y.o. male here for further discussion on decreasing diazepam for his anxiety.  This is being done due to a requirement by pain management.  They have requested a much lower dosing on his Valium.  He has been on 10 mg of Valium 3 times a day for years and has done well on that.  He is not pleased that he is having to decrease this as he has done so well for so long.  He is looking for a new pain management that perhaps will allow him to stay on it.  He says he feels anxious today.    I have reviewed the patient's relevant medical history and confirmed it is accurate.    I have personally reviewed and performed the ROS. Shabnam Zimmer MD     Objective   /86 (BP Location: Left arm)   Pulse 58   Ht 180.3 cm (71\")   Wt 101 kg (222 lb)   SpO2 99%   BMI 30.96 kg/m²     Physical Exam  Constitutional:       Appearance: He is well-developed.   Pulmonary:      Effort: Pulmonary effort is normal.   Neurological:      General: No focal deficit present.      Mental Status: He is alert.   Psychiatric:         Behavior: Behavior normal.         Thought Content: Thought content normal.         Judgment: Judgment normal.           Current Outpatient Medications:     aspirin 81 MG chewable tablet, Chew 1 tablet Daily., Disp: , Rfl:     clindamycin (CLEOCIN T) 1 % external solution, APPLY EXTERNALLY TO AFFECTED AREA TWO TIMES A DAY, Disp: 60 mL, Rfl: 3    diazePAM (VALIUM) 10 MG tablet, Take 10mg twice a day and 7.5mg once a day., Disp: , Rfl:     docusate sodium (COLACE) 250 MG capsule, Take 1 capsule by mouth Daily., Disp: 90 capsule, Rfl: 3    escitalopram (LEXAPRO) 20 MG tablet, Take 1 tablet by mouth Daily. 200001, Disp: 90 tablet, Rfl: 3    esomeprazole (nexIUM) 40 MG capsule, TAKE 1 CAPSULE BY MOUTH IN THE MORNING BEFORE BREAKFAST, Disp: 90 capsule, Rfl: 0    famotidine (PEPCID) 20 MG tablet, TAKE 1 TABLET BY MOUTH 2 TIMES A DAY (Patient taking differently: As needed), " "Disp: 180 tablet, Rfl: 1    fluticasone (Flonase) 50 MCG/ACT nasal spray, 2 sprays into the nostril(s) as directed by provider Daily., Disp: 15.8 mL, Rfl: 0    gabapentin (NEURONTIN) 600 MG tablet, Take 2 tablets by mouth 3 (Three) Times a Day. (Patient taking differently: Take 1 tablet by mouth 3 (Three) Times a Day.), Disp: 60 tablet, Rfl: 2    hydroCHLOROthiazide 25 MG tablet, TAKE 1/2 TABLET BY MOUTH EVERY DAY, Disp: 45 tablet, Rfl: 0    Insulin Syringe 28G X 1/2\" 0.5 ML misc, Use to inject testosterone., Disp: 100 each, Rfl: 0    Ketoconazole 2 % gel, Use twice daily over affected areas., Disp: 45 g, Rfl: 0    losartan (COZAAR) 100 MG tablet, Take 1 tablet by mouth Daily., Disp: 90 tablet, Rfl: 3    meloxicam (Mobic) 15 MG tablet, 1 tablet Daily., Disp: , Rfl:     methocarbamol (ROBAXIN) 750 MG tablet, Take 1 tablet by mouth 4 (Four) Times a Day As Needed for Muscle Spasms., Disp: 360 tablet, Rfl: 1    nebivolol (BYSTOLIC) 10 MG tablet, TAKE 1 TABLET BY MOUTH EVERY DAY, Disp: 90 tablet, Rfl: 1    ondansetron ODT (ZOFRAN-ODT) 8 MG disintegrating tablet, Take 1 tablet by mouth Every 8 (Eight) Hours As Needed for Nausea or Vomiting., Disp: 30 tablet, Rfl: 0    oxyCODONE-acetaminophen (PERCOCET)  MG per tablet, Take 1 tablet by mouth Every 4 (Four) Hours As Needed., Disp: 10 tablet, Rfl: 0    QUEtiapine (SEROquel) 25 MG tablet, Take 1 tablet by mouth As Needed., Disp: , Rfl:     tadalafil (Cialis) 20 MG tablet, Take 1 tablet by mouth Daily As Needed for Erectile Dysfunction., Disp: 30 tablet, Rfl: 2    valACYclovir (VALTREX) 1000 MG tablet, Take 1 tablet by mouth Daily., Disp: 3090 tablet, Rfl: 1    zolpidem (AMBIEN) 5 MG tablet, TAKE ONE TABLET BY MOUTH NIGHTLY AT BEDTIME AS NEEDED, Disp: 30 tablet, Rfl: 4    Testosterone Enanthate 200 MG/ML solution, Inject 100 mg into the appropriate muscle as directed by prescriber 1 (One) Time Per Week. (Patient not taking: Reported on 2/24/2025), Disp: 2 mL, Rfl: " 2    Assessment & Plan   Diagnoses and all orders for this visit:    1. Anxiety (Primary)  -Currently reducing the dose of diazepam as requested by pain management, will take a long time to taper to lower dose as he has been on this dose chronically and the requested dosage on the benzodiazepine is one third of his usual dose.  Currently reducing monthly.  Again discussed with patient.    2. Chronic joint pain  -Looking for a new pain management so that he can remain on his chronic benzo at present dosing           Shabnam Zimmer MD

## 2025-03-12 ENCOUNTER — TELEPHONE (OUTPATIENT)
Dept: INTERNAL MEDICINE | Facility: CLINIC | Age: 60
End: 2025-03-12
Payer: MEDICARE

## 2025-03-12 NOTE — TELEPHONE ENCOUNTER
Hub can relay message       Attempted to contact the pt to inform him we have sent his pain mgmt referral to [facility] the office of dr. Darinel vázquez, pt may wait for their office to call him or he may call them at [phone]

## 2025-03-12 NOTE — TELEPHONE ENCOUNTER
----- Message from Shabnam Zimmer sent at 2/26/2025  7:25 AM EST -----  Patient is referred to pain management, he wants to try [facility]clean if he takes his insurance.

## 2025-03-18 ENCOUNTER — LAB (OUTPATIENT)
Dept: INTERNAL MEDICINE | Facility: CLINIC | Age: 60
End: 2025-03-18
Payer: MEDICARE

## 2025-03-18 ENCOUNTER — OFFICE VISIT (OUTPATIENT)
Dept: INTERNAL MEDICINE | Facility: CLINIC | Age: 60
End: 2025-03-18
Payer: MEDICARE

## 2025-03-18 VITALS
BODY MASS INDEX: 30.52 KG/M2 | HEART RATE: 48 BPM | DIASTOLIC BLOOD PRESSURE: 70 MMHG | HEIGHT: 71 IN | SYSTOLIC BLOOD PRESSURE: 144 MMHG | TEMPERATURE: 97.5 F | WEIGHT: 218 LBS | OXYGEN SATURATION: 98 %

## 2025-03-18 DIAGNOSIS — Z79.899 MEDICATION MANAGEMENT: ICD-10-CM

## 2025-03-18 DIAGNOSIS — F41.9 ANXIETY AND DEPRESSION: Primary | ICD-10-CM

## 2025-03-18 DIAGNOSIS — Z83.49 FAMILY HISTORY OF HYPERTHYROIDISM: ICD-10-CM

## 2025-03-18 DIAGNOSIS — F32.A ANXIETY AND DEPRESSION: Primary | ICD-10-CM

## 2025-03-18 LAB
T4 FREE SERPL-MCNC: 1.31 NG/DL (ref 0.92–1.68)
TSH SERPL DL<=0.05 MIU/L-ACNC: 0.76 UIU/ML (ref 0.27–4.2)

## 2025-03-18 PROCEDURE — 3078F DIAST BP <80 MM HG: CPT | Performed by: INTERNAL MEDICINE

## 2025-03-18 PROCEDURE — 84443 ASSAY THYROID STIM HORMONE: CPT | Performed by: INTERNAL MEDICINE

## 2025-03-18 PROCEDURE — 3077F SYST BP >= 140 MM HG: CPT | Performed by: INTERNAL MEDICINE

## 2025-03-18 PROCEDURE — 99214 OFFICE O/P EST MOD 30 MIN: CPT | Performed by: INTERNAL MEDICINE

## 2025-03-18 PROCEDURE — 1159F MED LIST DOCD IN RCRD: CPT | Performed by: INTERNAL MEDICINE

## 2025-03-18 PROCEDURE — 1125F AMNT PAIN NOTED PAIN PRSNT: CPT | Performed by: INTERNAL MEDICINE

## 2025-03-18 PROCEDURE — 84439 ASSAY OF FREE THYROXINE: CPT | Performed by: INTERNAL MEDICINE

## 2025-03-18 PROCEDURE — 1160F RVW MEDS BY RX/DR IN RCRD: CPT | Performed by: INTERNAL MEDICINE

## 2025-03-18 NOTE — PROGRESS NOTES
"Subjective   Raj Kuhn is a 59 y.o. male here for concern over Percocet prescription.  He filled it normally, he gets it from pain management.  He says it was a different manufacture and he started to take it and over the last few days has felt like he is in withdrawals.  He brings the prescription today which is a mostly full bottle of Percocet 10 mg.  He says today he actually does feel little bit better.  He had upset stomach, fatigue, says it felt a lot like when he was withdrawing from hydrocodone in the past.  He had wanted to come off of the hydrocodone in the past which is why he had some withdrawal symptoms.  He did have an old prescription of Percocet from previous , he took 1 of those yesterday morning.  He also took 2 of his new prescription yesterday (for a total of 3 pills yesterday) and 1 new 1 this morning as usual.  He is also lost over 10 pounds, rather unintentionally.    I have reviewed the patient's relevant medical history and confirmed it is accurate.    I have personally reviewed and performed the ROS. Shabnam Zimmer MD     Objective   /70   Pulse (!) 48   Temp 97.5 °F (36.4 °C) (Infrared)   Ht 180.3 cm (71\")   Wt 98.9 kg (218 lb)   SpO2 98%   BMI 30.40 kg/m²     Physical Exam  Constitutional:       Appearance: He is well-developed.   Pulmonary:      Effort: Pulmonary effort is normal.   Neurological:      General: No focal deficit present.      Mental Status: He is alert.   Psychiatric:         Behavior: Behavior normal.         Thought Content: Thought content normal.         Judgment: Judgment normal.           Current Outpatient Medications:     aspirin 81 MG chewable tablet, Chew 1 tablet Daily., Disp: , Rfl:     clindamycin (CLEOCIN T) 1 % external solution, APPLY EXTERNALLY TO AFFECTED AREA TWO TIMES A DAY, Disp: 60 mL, Rfl: 3    diazePAM (VALIUM) 10 MG tablet, Take 10mg twice a day and 7.5mg once a day., Disp: , Rfl:     docusate sodium " "(COLACE) 250 MG capsule, Take 1 capsule by mouth Daily., Disp: 90 capsule, Rfl: 3    escitalopram (LEXAPRO) 20 MG tablet, Take 1 tablet by mouth Daily. 200001, Disp: 90 tablet, Rfl: 3    esomeprazole (nexIUM) 40 MG capsule, TAKE 1 CAPSULE BY MOUTH IN THE MORNING BEFORE BREAKFAST, Disp: 90 capsule, Rfl: 0    famotidine (PEPCID) 20 MG tablet, TAKE 1 TABLET BY MOUTH 2 TIMES A DAY (Patient taking differently: As needed), Disp: 180 tablet, Rfl: 1    fluticasone (Flonase) 50 MCG/ACT nasal spray, 2 sprays into the nostril(s) as directed by provider Daily., Disp: 15.8 mL, Rfl: 0    gabapentin (NEURONTIN) 600 MG tablet, Take 2 tablets by mouth 3 (Three) Times a Day. (Patient taking differently: Take 1 tablet by mouth 3 (Three) Times a Day.), Disp: 60 tablet, Rfl: 2    hydroCHLOROthiazide 25 MG tablet, TAKE 1/2 TABLET BY MOUTH EVERY DAY, Disp: 45 tablet, Rfl: 0    Insulin Syringe 28G X 1/2\" 0.5 ML misc, Use to inject testosterone., Disp: 100 each, Rfl: 0    Ketoconazole 2 % gel, Use twice daily over affected areas., Disp: 45 g, Rfl: 0    losartan (COZAAR) 100 MG tablet, Take 1 tablet by mouth Daily., Disp: 90 tablet, Rfl: 3    meloxicam (Mobic) 15 MG tablet, 1 tablet Daily., Disp: , Rfl:     methocarbamol (ROBAXIN) 750 MG tablet, Take 1 tablet by mouth 4 (Four) Times a Day As Needed for Muscle Spasms., Disp: 360 tablet, Rfl: 1    nebivolol (BYSTOLIC) 10 MG tablet, TAKE 1 TABLET BY MOUTH EVERY DAY, Disp: 90 tablet, Rfl: 1    ondansetron ODT (ZOFRAN-ODT) 8 MG disintegrating tablet, Take 1 tablet by mouth Every 8 (Eight) Hours As Needed for Nausea or Vomiting., Disp: 30 tablet, Rfl: 0    oxyCODONE-acetaminophen (PERCOCET)  MG per tablet, Take 1 tablet by mouth Every 4 (Four) Hours As Needed., Disp: 10 tablet, Rfl: 0    QUEtiapine (SEROquel) 25 MG tablet, Take 1 tablet by mouth As Needed., Disp: , Rfl:     tadalafil (Cialis) 20 MG tablet, Take 1 tablet by mouth Daily As Needed for Erectile Dysfunction., Disp: 30 tablet, " Rfl: 2    valACYclovir (VALTREX) 1000 MG tablet, Take 1 tablet by mouth Daily., Disp: 3090 tablet, Rfl: 1    zolpidem (AMBIEN) 5 MG tablet, TAKE ONE TABLET BY MOUTH NIGHTLY AT BEDTIME AS NEEDED, Disp: 30 tablet, Rfl: 4    Testosterone Enanthate 200 MG/ML solution, Inject 100 mg into the appropriate muscle as directed by prescriber 1 (One) Time Per Week. (Patient not taking: Reported on 3/18/2025), Disp: 2 mL, Rfl: 2    Assessment & Plan   Diagnoses and all orders for this visit:    1. Anxiety and depression (Primary)  -     Compliance Drug Analysis, Ur - Urine, Clean Catch; Future    2. Medication management  -     Compliance Drug Analysis, Ur - Urine, Clean Catch; Future    3. Family history of hyperthyroidism  -     TSH  -     T4, Free             Shabnam Zimmer MD

## 2025-03-24 LAB — DRUGS UR: NORMAL

## 2025-03-25 ENCOUNTER — OFFICE VISIT (OUTPATIENT)
Dept: INTERNAL MEDICINE | Facility: CLINIC | Age: 60
End: 2025-03-25
Payer: MEDICARE

## 2025-03-25 VITALS
SYSTOLIC BLOOD PRESSURE: 154 MMHG | WEIGHT: 218 LBS | HEIGHT: 71 IN | DIASTOLIC BLOOD PRESSURE: 74 MMHG | OXYGEN SATURATION: 98 % | BODY MASS INDEX: 30.52 KG/M2 | HEART RATE: 72 BPM

## 2025-03-25 DIAGNOSIS — S90.561A INSECT BITE OF RIGHT ANKLE, INITIAL ENCOUNTER: Primary | ICD-10-CM
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
## 2025-03-25 DIAGNOSIS — G89.29 CHRONIC JOINT PAIN: ICD-10-CM

## 2025-03-25 DIAGNOSIS — M25.50 CHRONIC JOINT PAIN: ICD-10-CM

## 2025-03-25 NOTE — PROGRESS NOTES
"Subjective   Raj Kuhn is a 59 y.o. male here for bite on right ankle.  He says he was driving and he felt several sharp little pricks onto his right ankle.  He reached into his boot and scratched and he did bring some skin back with that but never did see any spider or anything like that.  With his many prostheses, he wanted to make sure it was not infected.  He did take a course of Augmentin from one of his close friends who is a prescriber.  He also says he is doing okay in terms of his new opiate prescription.  Denies any new symptoms related to that.    I have reviewed the patient's relevant medical history and confirmed it is accurate.    I have personally reviewed and performed the ROS. Shabnam Zimmer MD     Objective   /74 (BP Location: Left arm)   Pulse 72   Ht 180.3 cm (71\")   Wt 98.9 kg (218 lb)   SpO2 98%   BMI 30.40 kg/m²     Physical Exam  Skin:     Comments: On right lateral ankle there is a 1.5 cm excoriation though no blisters, signs of infection, drainage       Current Outpatient Medications:     amoxicillin-clavulanate (AUGMENTIN) 875-125 MG per tablet, 1 tablet 2 (Two) Times a Day., Disp: , Rfl:     aspirin 81 MG chewable tablet, Chew 1 tablet Daily., Disp: , Rfl:     clindamycin (CLEOCIN T) 1 % external solution, APPLY EXTERNALLY TO AFFECTED AREA TWO TIMES A DAY, Disp: 60 mL, Rfl: 3    diazePAM (VALIUM) 10 MG tablet, Take 10mg twice a day and 7.5mg once a day., Disp: , Rfl:     docusate sodium (COLACE) 250 MG capsule, Take 1 capsule by mouth Daily., Disp: 90 capsule, Rfl: 3    escitalopram (LEXAPRO) 20 MG tablet, Take 1 tablet by mouth Daily. 200001, Disp: 90 tablet, Rfl: 3    esomeprazole (nexIUM) 40 MG capsule, TAKE 1 CAPSULE BY MOUTH IN THE MORNING BEFORE BREAKFAST, Disp: 90 capsule, Rfl: 0    famotidine (PEPCID) 20 MG tablet, TAKE 1 TABLET BY MOUTH 2 TIMES A DAY (Patient taking differently: As needed), Disp: 180 tablet, Rfl: 1    fluticasone (Flonase) 50 MCG/ACT " "nasal spray, 2 sprays into the nostril(s) as directed by provider Daily., Disp: 15.8 mL, Rfl: 0    gabapentin (NEURONTIN) 600 MG tablet, Take 2 tablets by mouth 3 (Three) Times a Day. (Patient taking differently: Take 1 tablet by mouth 3 (Three) Times a Day.), Disp: 60 tablet, Rfl: 2    hydroCHLOROthiazide 25 MG tablet, TAKE 1/2 TABLET BY MOUTH EVERY DAY, Disp: 45 tablet, Rfl: 0    Insulin Syringe 28G X 1/2\" 0.5 ML misc, Use to inject testosterone., Disp: 100 each, Rfl: 0    Ketoconazole 2 % gel, Use twice daily over affected areas., Disp: 45 g, Rfl: 0    losartan (COZAAR) 100 MG tablet, Take 1 tablet by mouth Daily., Disp: 90 tablet, Rfl: 3    meloxicam (Mobic) 15 MG tablet, 1 tablet Daily., Disp: , Rfl:     methocarbamol (ROBAXIN) 750 MG tablet, Take 1 tablet by mouth 4 (Four) Times a Day As Needed for Muscle Spasms., Disp: 360 tablet, Rfl: 1    nebivolol (BYSTOLIC) 10 MG tablet, TAKE 1 TABLET BY MOUTH EVERY DAY, Disp: 90 tablet, Rfl: 1    ondansetron ODT (ZOFRAN-ODT) 8 MG disintegrating tablet, Take 1 tablet by mouth Every 8 (Eight) Hours As Needed for Nausea or Vomiting., Disp: 30 tablet, Rfl: 0    oxyCODONE-acetaminophen (PERCOCET)  MG per tablet, Take 1 tablet by mouth Every 4 (Four) Hours As Needed., Disp: 10 tablet, Rfl: 0    QUEtiapine (SEROquel) 25 MG tablet, Take 1 tablet by mouth As Needed., Disp: , Rfl:     tadalafil (Cialis) 20 MG tablet, Take 1 tablet by mouth Daily As Needed for Erectile Dysfunction., Disp: 30 tablet, Rfl: 2    valACYclovir (VALTREX) 1000 MG tablet, Take 1 tablet by mouth Daily., Disp: 3090 tablet, Rfl: 1    zolpidem (AMBIEN) 5 MG tablet, TAKE ONE TABLET BY MOUTH NIGHTLY AT BEDTIME AS NEEDED, Disp: 30 tablet, Rfl: 4    Testosterone Enanthate 200 MG/ML solution, Inject 100 mg into the appropriate muscle as directed by prescriber 1 (One) Time Per Week. (Patient not taking: Reported on 2/24/2025), Disp: 2 mL, Rfl: 2    Assessment & Plan   Diagnoses and all orders for this " visit:    1. Insect bite of right ankle, initial encounter (Primary)  -s/p Augmentin, reassured patient that there are no signs of infection or necrosis    2. Chronic joint pain  -Continue opiate prescription through pain management           Shabnam Zimmer MD

## 2025-03-27 DIAGNOSIS — I10 BENIGN ESSENTIAL HTN: ICD-10-CM

## 2025-03-27 RX ORDER — NEBIVOLOL 10 MG/1
10 TABLET ORAL DAILY
Qty: 90 TABLET | Refills: 0 | Status: SHIPPED | OUTPATIENT
Start: 2025-03-27

## 2025-05-07 DIAGNOSIS — F41.9 ANXIETY: ICD-10-CM

## 2025-05-08 RX ORDER — DIAZEPAM 10 MG/1
10 TABLET ORAL EVERY 8 HOURS PRN
Qty: 90 TABLET | Refills: 3 | Status: SHIPPED | OUTPATIENT
Start: 2025-05-08

## 2025-05-22 DIAGNOSIS — K59.03 DRUG-INDUCED CONSTIPATION: ICD-10-CM

## 2025-05-22 DIAGNOSIS — K21.9 GASTROESOPHAGEAL REFLUX DISEASE WITHOUT ESOPHAGITIS: ICD-10-CM

## 2025-05-22 RX ORDER — DOCUSATE SODIUM 250 MG
1 CAPSULE ORAL DAILY
Qty: 90 CAPSULE | Refills: 3 | Status: SHIPPED | OUTPATIENT
Start: 2025-05-22

## 2025-05-22 RX ORDER — ESOMEPRAZOLE MAGNESIUM 40 MG/1
40 CAPSULE, DELAYED RELEASE ORAL
Qty: 90 CAPSULE | Refills: 0 | Status: SHIPPED | OUTPATIENT
Start: 2025-05-22

## 2025-06-04 ENCOUNTER — OFFICE VISIT (OUTPATIENT)
Dept: INTERNAL MEDICINE | Age: 60
End: 2025-06-04
Payer: MEDICARE

## 2025-06-04 VITALS
HEART RATE: 63 BPM | HEIGHT: 71 IN | WEIGHT: 232 LBS | TEMPERATURE: 98.8 F | DIASTOLIC BLOOD PRESSURE: 76 MMHG | OXYGEN SATURATION: 98 % | SYSTOLIC BLOOD PRESSURE: 132 MMHG | BODY MASS INDEX: 32.48 KG/M2

## 2025-06-04 DIAGNOSIS — R11.0 NAUSEA: ICD-10-CM

## 2025-06-04 DIAGNOSIS — J06.9 ACUTE URI: Primary | ICD-10-CM

## 2025-06-04 DIAGNOSIS — F51.01 PRIMARY INSOMNIA: ICD-10-CM

## 2025-06-04 LAB
EXPIRATION DATE: NORMAL
FLUAV AG UPPER RESP QL IA.RAPID: NOT DETECTED
FLUBV AG UPPER RESP QL IA.RAPID: NOT DETECTED
INTERNAL CONTROL: NORMAL
Lab: NORMAL
SARS-COV-2 AG UPPER RESP QL IA.RAPID: NOT DETECTED

## 2025-06-04 RX ORDER — AMOXICILLIN 500 MG/1
500 CAPSULE ORAL 2 TIMES DAILY
Qty: 14 CAPSULE | Refills: 0 | Status: SHIPPED | OUTPATIENT
Start: 2025-06-04

## 2025-06-04 RX ORDER — FLUTICASONE PROPIONATE 50 MCG
2 SPRAY, SUSPENSION (ML) NASAL DAILY
Qty: 16 G | Refills: 3 | Status: SHIPPED | OUTPATIENT
Start: 2025-06-04

## 2025-06-04 RX ORDER — ONDANSETRON 8 MG/1
8 TABLET, ORALLY DISINTEGRATING ORAL EVERY 8 HOURS PRN
Qty: 30 TABLET | Refills: 5 | Status: SHIPPED | OUTPATIENT
Start: 2025-06-04

## 2025-06-04 RX ORDER — ZOLPIDEM TARTRATE 5 MG/1
5 TABLET ORAL NIGHTLY PRN
Qty: 30 TABLET | Refills: 4 | Status: SHIPPED | OUTPATIENT
Start: 2025-06-04

## 2025-06-05 NOTE — PROGRESS NOTES
"Subjective   Raj Kuhn is a 59 y.o. male here for sinus congestion and pressure, slightly sore throat, ear pressure. Feels like sinus infection. No sick contacts. Needs some refills. Has been a bit nauseous from time to time.    I have reviewed the patient's relevant medical history and confirmed it is accurate.    I have personally reviewed and performed the ROS. Shabnam Zimmer MD     Objective   /76 (BP Location: Left arm)   Pulse 63   Temp 98.8 °F (37.1 °C)   Ht 180.3 cm (71\")   Wt 105 kg (232 lb)   SpO2 98%   BMI 32.36 kg/m²     Physical Exam  Vitals reviewed.   Constitutional:       Appearance: He is well-developed.   HENT:      Right Ear: Ear canal normal. A middle ear effusion is present.      Left Ear: Ear canal normal. A middle ear effusion is present.   Cardiovascular:      Rate and Rhythm: Normal rate and regular rhythm.      Heart sounds: Normal heart sounds.   Pulmonary:      Effort: Pulmonary effort is normal.      Breath sounds: Normal breath sounds. No wheezing or rales.   Skin:     General: Skin is warm and dry.   Neurological:      Mental Status: He is alert and oriented to person, place, and time.   Psychiatric:         Behavior: Behavior normal.         Thought Content: Thought content normal.           Current Outpatient Medications:     aspirin 81 MG chewable tablet, Chew 1 tablet Daily., Disp: , Rfl:     clindamycin (CLEOCIN T) 1 % external solution, APPLY EXTERNALLY TO AFFECTED AREA TWO TIMES A DAY, Disp: 60 mL, Rfl: 3    diazePAM (VALIUM) 10 MG tablet, TAKE ONE TABLET BY MOUTH EVERY 8 HOURS AS NEEDED FOR ANXIETY, Disp: 90 tablet, Rfl: 3    docusate sodium (COLACE) 250 MG capsule, Take 1 capsule by mouth Daily., Disp: 90 capsule, Rfl: 3    escitalopram (LEXAPRO) 20 MG tablet, Take 1 tablet by mouth Daily. 200001, Disp: 90 tablet, Rfl: 3    esomeprazole (nexIUM) 40 MG capsule, Take 1 capsule by mouth Every Morning Before Breakfast., Disp: 90 capsule, Rfl: 0    " "famotidine (PEPCID) 20 MG tablet, TAKE 1 TABLET BY MOUTH 2 TIMES A DAY (Patient taking differently: As needed), Disp: 180 tablet, Rfl: 1    fluticasone (Flonase) 50 MCG/ACT nasal spray, Administer 2 sprays into the nostril(s) as directed by provider Daily., Disp: 16 g, Rfl: 3    gabapentin (NEURONTIN) 600 MG tablet, Take 2 tablets by mouth 3 (Three) Times a Day. (Patient taking differently: Take 1 tablet by mouth 3 (Three) Times a Day.), Disp: 60 tablet, Rfl: 2    hydroCHLOROthiazide 25 MG tablet, TAKE 1/2 TABLET BY MOUTH EVERY DAY, Disp: 45 tablet, Rfl: 0    Insulin Syringe 28G X 1/2\" 0.5 ML misc, Use to inject testosterone., Disp: 100 each, Rfl: 0    Ketoconazole 2 % gel, Use twice daily over affected areas., Disp: 45 g, Rfl: 0    losartan (COZAAR) 100 MG tablet, Take 1 tablet by mouth Daily., Disp: 90 tablet, Rfl: 3    meloxicam (Mobic) 15 MG tablet, 1 tablet Daily., Disp: , Rfl:     methocarbamol (ROBAXIN) 750 MG tablet, Take 1 tablet by mouth 4 (Four) Times a Day As Needed for Muscle Spasms., Disp: 360 tablet, Rfl: 1    nebivolol (BYSTOLIC) 10 MG tablet, TAKE 1 TABLET BY MOUTH EVERY DAY, Disp: 90 tablet, Rfl: 0    ondansetron ODT (ZOFRAN-ODT) 8 MG disintegrating tablet, Take 1 tablet by mouth Every 8 (Eight) Hours As Needed for Nausea or Vomiting., Disp: 30 tablet, Rfl: 5    oxyCODONE-acetaminophen (PERCOCET)  MG per tablet, Take 1 tablet by mouth Every 4 (Four) Hours As Needed., Disp: 10 tablet, Rfl: 0    QUEtiapine (SEROquel) 25 MG tablet, Take 1 tablet by mouth As Needed., Disp: , Rfl:     tadalafil (Cialis) 20 MG tablet, Take 1 tablet by mouth Daily As Needed for Erectile Dysfunction., Disp: 30 tablet, Rfl: 2    Testosterone Enanthate 200 MG/ML solution, Inject 100 mg into the appropriate muscle as directed by prescriber 1 (One) Time Per Week., Disp: 2 mL, Rfl: 2    valACYclovir (VALTREX) 1000 MG tablet, Take 1 tablet by mouth Daily., Disp: 3090 tablet, Rfl: 1    zolpidem (AMBIEN) 5 MG tablet, Take 1 " tablet by mouth At Night As Needed for Sleep., Disp: 30 tablet, Rfl: 4    amoxicillin (AMOXIL) 500 MG capsule, Take 1 capsule by mouth 2 (Two) Times a Day., Disp: 14 capsule, Rfl: 0    Assessment & Plan   Diagnoses and all orders for this visit:    1. Acute URI (Primary)  -     amoxicillin (AMOXIL) 500 MG capsule; Take 1 capsule by mouth 2 (Two) Times a Day.  Dispense: 14 capsule; Refill: 0  -     POCT SARS-CoV-2 Antigen LUIGI + Flu: neg    2. Nausea  -     ondansetron ODT (ZOFRAN-ODT) 8 MG disintegrating tablet; Take 1 tablet by mouth Every 8 (Eight) Hours As Needed for Nausea or Vomiting.  Dispense: 30 tablet; Refill: 5    3. Primary insomnia  -     zolpidem (AMBIEN) 5 MG tablet; Take 1 tablet by mouth At Night As Needed for Sleep.  Dispense: 30 tablet; Refill: 4    Other orders  -     fluticasone (Flonase) 50 MCG/ACT nasal spray; Administer 2 sprays into the nostril(s) as directed by provider Daily.  Dispense: 16 g; Refill: 3             Shabnam Zimmer MD      Answers submitted by the patient for this visit:  Sore Throat Questionnaire (Submitted on 6/4/2025)  Chief Complaint: Sore throat  Chronicity: new  Onset: in the past 7 days  Progression since onset: improving  Pain worse on: both  Fever: 100 - 101 F  Fever duration: 1 to 2 days  Pain - numeric: 6/10  congestion: Yes  drainage: Yes  ear pain: Yes  headaches: Yes  Additional Information: I noticed drainage and a sore throat Saturday, mu ear ache started Sunday along with headache and fever sunday night throught Monday, also things tasted funny Monday, better Tue, headach, fatigue, drainage remain wonder if Covid or Sinus Infection

## 2025-06-24 DIAGNOSIS — I10 BENIGN ESSENTIAL HTN: ICD-10-CM

## 2025-06-24 RX ORDER — NEBIVOLOL 10 MG/1
10 TABLET ORAL DAILY
Qty: 90 TABLET | Refills: 1 | Status: SHIPPED | OUTPATIENT
Start: 2025-06-24

## 2025-07-17 DIAGNOSIS — I10 BENIGN ESSENTIAL HTN: ICD-10-CM

## 2025-07-17 RX ORDER — LOSARTAN POTASSIUM 100 MG/1
100 TABLET ORAL DAILY
Qty: 90 TABLET | Refills: 2 | Status: SHIPPED | OUTPATIENT
Start: 2025-07-17

## 2025-07-30 ENCOUNTER — OFFICE VISIT (OUTPATIENT)
Dept: INTERNAL MEDICINE | Age: 60
End: 2025-07-30
Payer: MEDICARE

## 2025-07-30 VITALS
HEART RATE: 48 BPM | SYSTOLIC BLOOD PRESSURE: 148 MMHG | HEIGHT: 71 IN | WEIGHT: 228 LBS | DIASTOLIC BLOOD PRESSURE: 78 MMHG | OXYGEN SATURATION: 98 % | BODY MASS INDEX: 31.92 KG/M2

## 2025-07-30 DIAGNOSIS — G89.29 CHRONIC LOW BACK PAIN WITH BILATERAL SCIATICA, UNSPECIFIED BACK PAIN LATERALITY: ICD-10-CM

## 2025-07-30 DIAGNOSIS — T63.301A SPIDER BITE WOUND, ACCIDENTAL OR UNINTENTIONAL, INITIAL ENCOUNTER: Primary | ICD-10-CM

## 2025-07-30 DIAGNOSIS — M54.41 CHRONIC LOW BACK PAIN WITH BILATERAL SCIATICA, UNSPECIFIED BACK PAIN LATERALITY: ICD-10-CM

## 2025-07-30 DIAGNOSIS — M25.562 ACUTE PAIN OF LEFT KNEE: ICD-10-CM

## 2025-07-30 DIAGNOSIS — M54.42 CHRONIC LOW BACK PAIN WITH BILATERAL SCIATICA, UNSPECIFIED BACK PAIN LATERALITY: ICD-10-CM

## 2025-07-30 DIAGNOSIS — R79.89 LOW TESTOSTERONE: ICD-10-CM

## 2025-07-30 RX ORDER — KETOROLAC TROMETHAMINE 30 MG/ML
30 INJECTION, SOLUTION INTRAMUSCULAR; INTRAVENOUS ONCE
Status: DISCONTINUED | OUTPATIENT
Start: 2025-07-30 | End: 2025-07-30

## 2025-07-30 RX ORDER — DOXYCYCLINE 100 MG/1
100 CAPSULE ORAL 2 TIMES DAILY
Qty: 14 CAPSULE | Refills: 0 | Status: SHIPPED | OUTPATIENT
Start: 2025-07-30 | End: 2025-08-06

## 2025-07-30 RX ORDER — TESTOSTERONE ENANTHATE 200 MG/ML
100 INJECTION, SOLUTION INTRAMUSCULAR WEEKLY
Qty: 2 ML | Refills: 2 | Status: SHIPPED | OUTPATIENT
Start: 2025-07-30

## 2025-07-30 RX ORDER — KETOROLAC TROMETHAMINE 30 MG/ML
30 INJECTION, SOLUTION INTRAMUSCULAR; INTRAVENOUS ONCE
Status: COMPLETED | OUTPATIENT
Start: 2025-07-30 | End: 2025-07-30

## 2025-07-30 RX ADMIN — KETOROLAC TROMETHAMINE 30 MG: 30 INJECTION, SOLUTION INTRAMUSCULAR; INTRAVENOUS at 11:58

## 2025-07-30 NOTE — PROGRESS NOTES
"Subjective   Raj Kuhn is a 59 y.o. male here for bite on abdomen, left chronic low back pain flare.  Coming out he would like a Toradol injection as he feels like that usually does pretty well for him.  The bite on the abdomen occurred on Saturday, he did not see any insect.  It has since become a bit more red, is painful out of proportion to how big it is and itchy as well.  He has seen brown recluse around his home.  Needs refill on testosterone.    I have reviewed the patient's relevant medical history and confirmed it is accurate.    I have personally reviewed and performed the ROS. Shabnam Zimmer MD     Objective   /78 (BP Location: Left arm)   Pulse (!) 48   Ht 180.3 cm (71\")   Wt 103 kg (228 lb)   SpO2 98%   BMI 31.80 kg/m²     Physical Exam  Constitutional:       Appearance: He is well-developed.   Pulmonary:      Effort: Pulmonary effort is normal.   Skin:            Comments: Left lower abdomen just to the left of the umbilicus there is a pinpoint erythematous lesion with about 1 cm of surrounding erythema, no induration, no signs of necrosis   Neurological:      General: No focal deficit present.      Mental Status: He is alert.   Psychiatric:         Behavior: Behavior normal.         Thought Content: Thought content normal.         Judgment: Judgment normal.           Current Outpatient Medications:     aspirin 81 MG chewable tablet, Chew 1 tablet Daily., Disp: , Rfl:     clindamycin (CLEOCIN T) 1 % external solution, APPLY EXTERNALLY TO AFFECTED AREA TWO TIMES A DAY, Disp: 60 mL, Rfl: 3    diazePAM (VALIUM) 10 MG tablet, TAKE ONE TABLET BY MOUTH EVERY 8 HOURS AS NEEDED FOR ANXIETY, Disp: 90 tablet, Rfl: 3    docusate sodium (COLACE) 250 MG capsule, Take 1 capsule by mouth Daily., Disp: 90 capsule, Rfl: 3    escitalopram (LEXAPRO) 20 MG tablet, Take 1 tablet by mouth Daily. 200001, Disp: 90 tablet, Rfl: 3    esomeprazole (nexIUM) 40 MG capsule, Take 1 capsule by mouth Every " "Morning Before Breakfast., Disp: 90 capsule, Rfl: 0    famotidine (PEPCID) 20 MG tablet, TAKE 1 TABLET BY MOUTH 2 TIMES A DAY (Patient taking differently: As needed), Disp: 180 tablet, Rfl: 1    fluticasone (Flonase) 50 MCG/ACT nasal spray, Administer 2 sprays into the nostril(s) as directed by provider Daily., Disp: 16 g, Rfl: 3    gabapentin (NEURONTIN) 600 MG tablet, Take 2 tablets by mouth 3 (Three) Times a Day. (Patient taking differently: Take 1 tablet by mouth 3 (Three) Times a Day.), Disp: 60 tablet, Rfl: 2    hydroCHLOROthiazide 25 MG tablet, TAKE 1/2 TABLET BY MOUTH EVERY DAY, Disp: 45 tablet, Rfl: 0    Insulin Syringe 28G X 1/2\" 0.5 ML misc, Use to inject testosterone., Disp: 100 each, Rfl: 0    Ketoconazole 2 % gel, Use twice daily over affected areas., Disp: 45 g, Rfl: 0    losartan (COZAAR) 100 MG tablet, TAKE 1 TABLET BY MOUTH EVERY DAY, Disp: 90 tablet, Rfl: 2    meloxicam (Mobic) 15 MG tablet, 1 tablet Daily., Disp: , Rfl:     methocarbamol (ROBAXIN) 750 MG tablet, Take 1 tablet by mouth 4 (Four) Times a Day As Needed for Muscle Spasms., Disp: 360 tablet, Rfl: 1    nebivolol (BYSTOLIC) 10 MG tablet, Take 1 tablet by mouth Daily., Disp: 90 tablet, Rfl: 1    ondansetron ODT (ZOFRAN-ODT) 8 MG disintegrating tablet, Take 1 tablet by mouth Every 8 (Eight) Hours As Needed for Nausea or Vomiting., Disp: 30 tablet, Rfl: 5    oxyCODONE-acetaminophen (PERCOCET)  MG per tablet, Take 1 tablet by mouth Every 4 (Four) Hours As Needed., Disp: 10 tablet, Rfl: 0    QUEtiapine (SEROquel) 25 MG tablet, Take 1 tablet by mouth As Needed., Disp: , Rfl:     tadalafil (Cialis) 20 MG tablet, Take 1 tablet by mouth Daily As Needed for Erectile Dysfunction., Disp: 30 tablet, Rfl: 2    Testosterone Enanthate 200 MG/ML solution, Inject 100 mg into the appropriate muscle as directed by prescriber 1 (One) Time Per Week., Disp: 2 mL, Rfl: 2    valACYclovir (VALTREX) 1000 MG tablet, Take 1 tablet by mouth Daily., Disp: 0622 " tablet, Rfl: 1    zolpidem (AMBIEN) 5 MG tablet, Take 1 tablet by mouth At Night As Needed for Sleep., Disp: 30 tablet, Rfl: 4    Assessment & Plan   Diagnoses and all orders for this visit:    1. Spider bite wound, accidental or unintentional, initial encounter (Primary)  -     doxycycline (VIBRAMYCIN) 100 MG capsule; Take 1 capsule by mouth 2 (Two) Times a Day for 7 days.  Dispense: 14 capsule; Refill: 0  - Will treat with antibiotic as he is having quite a bit of pain and worsening redness.  Advised him if the lesion starts to turn purple or has the appearance of any gangrene to let me know ASAP    2. Low testosterone  -     Testosterone Enanthate 200 MG/ML solution; Inject 100 mg into the appropriate muscle as directed by prescriber 1 (One) Time Per Week.  Dispense: 2 mL; Refill: 2    3. Chronic low back pain with bilateral sciatica, unspecified back pain laterality  -     ketorolac (TORADOL) injection 30 mg             Shabnam Zimmer MD

## 2025-08-05 ENCOUNTER — PRIOR AUTHORIZATION (OUTPATIENT)
Dept: INTERNAL MEDICINE | Age: 60
End: 2025-08-05
Payer: MEDICARE

## 2025-08-20 ENCOUNTER — OFFICE VISIT (OUTPATIENT)
Dept: INTERNAL MEDICINE | Age: 60
End: 2025-08-20
Payer: MEDICARE

## 2025-08-20 VITALS
HEIGHT: 71 IN | HEART RATE: 45 BPM | SYSTOLIC BLOOD PRESSURE: 140 MMHG | DIASTOLIC BLOOD PRESSURE: 76 MMHG | BODY MASS INDEX: 31.64 KG/M2 | WEIGHT: 226 LBS | OXYGEN SATURATION: 97 %

## 2025-08-20 DIAGNOSIS — S00.412A ABRASION OF LEFT EAR CANAL, INITIAL ENCOUNTER: Primary | ICD-10-CM

## 2025-08-20 DIAGNOSIS — K21.9 GASTROESOPHAGEAL REFLUX DISEASE WITHOUT ESOPHAGITIS: ICD-10-CM

## 2025-08-20 RX ORDER — ESOMEPRAZOLE MAGNESIUM 40 MG/1
40 CAPSULE, DELAYED RELEASE ORAL
Qty: 90 CAPSULE | Refills: 3 | Status: SHIPPED | OUTPATIENT
Start: 2025-08-20

## 2025-08-28 DIAGNOSIS — F41.9 ANXIETY: ICD-10-CM

## 2025-08-28 RX ORDER — DIAZEPAM 10 MG/1
10 TABLET ORAL EVERY 8 HOURS PRN
Qty: 90 TABLET | Refills: 2 | Status: SHIPPED | OUTPATIENT
Start: 2025-08-28